# Patient Record
Sex: MALE | Race: OTHER | HISPANIC OR LATINO | URBAN - METROPOLITAN AREA
[De-identification: names, ages, dates, MRNs, and addresses within clinical notes are randomized per-mention and may not be internally consistent; named-entity substitution may affect disease eponyms.]

---

## 2020-04-09 ENCOUNTER — EMERGENCY (EMERGENCY)
Facility: HOSPITAL | Age: 30
LOS: 1 days | Discharge: HOME | End: 2020-04-09
Attending: EMERGENCY MEDICINE
Payer: MEDICARE

## 2020-04-09 VITALS
WEIGHT: 121.25 LBS | SYSTOLIC BLOOD PRESSURE: 124 MMHG | HEART RATE: 94 BPM | DIASTOLIC BLOOD PRESSURE: 79 MMHG | RESPIRATION RATE: 22 BRPM

## 2020-04-09 PROCEDURE — 99283 EMERGENCY DEPT VISIT LOW MDM: CPT

## 2020-04-09 RX ORDER — ACETAMINOPHEN 500 MG
650 TABLET ORAL ONCE
Refills: 0 | Status: COMPLETED | OUTPATIENT
Start: 2020-04-09 | End: 2020-04-09

## 2020-04-09 RX ADMIN — Medication 650 MILLIGRAM(S): at 22:42

## 2020-04-09 NOTE — ED PROVIDER NOTE - NSFOLLOWUPINSTRUCTIONS_ED_ALL_ED_FT
COVID-19 testing are currently being prioritized at [facility] for admitted patients. Patients that are stable for discharged from the ED will have COVID-19 testing performed within 7 days at which time most symptoms should improve.  For that reason, although there is suspicion that you may have Coronavirus, you may not have been tested today. Please follow instruction on provided COVID-19 discharge educational forms and self quarantine for 14 days. Return to the ED immediately if you have shortness of breath, fever, pain, weakness, vomiting any concerns.  For questions on COVID-19 :                                                                                                      [facility] Hotline # 1- [phone]                                                                                                                        [facility] Covid-19 website - [email or web address]     Drink plenty of fluids take Tylenol 2 tabs every 4 hours for fever and body aches.

## 2020-04-09 NOTE — ED PROVIDER NOTE - CLINICAL SUMMARY MEDICAL DECISION MAKING FREE TEXT BOX
105a- Pt is nontoxic appearing, not hypoxic (repeat Pox 96% RA), no respiratory distress, not hypotensive. Now afebrile.  High suspicion for COVID 19 given pandemic. Pt instructed on self quarantine and monitoring. Return precautions explained.   Pt is well appearing, has no new complaints and able to walk with normal gait. Pt is stable for discharge and follow up with medical doctor. Pt educated on care and need for follow up. Discussed anticipatory guidance and return precautions. Questions answered. I had a detailed discussion with the patient and/or guardian regarding the historical points, exam findings, and any diagnostic results supporting the discharge diagnosis.

## 2020-04-09 NOTE — ED PROVIDER NOTE - PATIENT PORTAL LINK FT
You can access the FollowMyHealth Patient Portal offered by [facility] by registering at the following website: [email or web address]. By joining [employer]’s FollowMyHealth portal, you will also be able to view your health information using other applications (apps) compatible with our system.

## 2020-04-09 NOTE — ED PROVIDER NOTE - NSFOLLOWUPCLINICS_GEN_ALL_ED_FT
[facility]  Internal Medicine  [address] [place] [address]  Phone: [phone]  Fax: [phone]  Follow Up Time:

## 2020-04-10 ENCOUNTER — EMERGENCY (EMERGENCY)
Facility: HOSPITAL | Age: 30
LOS: 1 days | Discharge: HOME | End: 2020-04-10
Attending: EMERGENCY MEDICINE
Payer: SELF-PAY

## 2020-04-10 VITALS
RESPIRATION RATE: 20 BRPM | WEIGHT: 195.11 LBS | TEMPERATURE: 97 F | OXYGEN SATURATION: 92 % | SYSTOLIC BLOOD PRESSURE: 112 MMHG | DIASTOLIC BLOOD PRESSURE: 64 MMHG | HEART RATE: 83 BPM | HEIGHT: 67 IN

## 2020-04-10 VITALS
RESPIRATION RATE: 20 BRPM | SYSTOLIC BLOOD PRESSURE: 148 MMHG | OXYGEN SATURATION: 94 % | TEMPERATURE: 99 F | DIASTOLIC BLOOD PRESSURE: 88 MMHG | HEART RATE: 89 BPM

## 2020-04-10 VITALS
TEMPERATURE: 99 F | RESPIRATION RATE: 20 BRPM | HEART RATE: 76 BPM | OXYGEN SATURATION: 96 % | SYSTOLIC BLOOD PRESSURE: 111 MMHG | DIASTOLIC BLOOD PRESSURE: 69 MMHG

## 2020-04-10 LAB — SARS-COV-2 RNA SPEC QL NAA+PROBE: DETECTED

## 2020-04-10 PROCEDURE — 99283 EMERGENCY DEPT VISIT LOW MDM: CPT

## 2020-04-10 PROCEDURE — 71045 X-RAY EXAM CHEST 1 VIEW: CPT

## 2020-04-10 PROCEDURE — 87635 SARS-COV-2 COVID-19 AMP PRB: CPT

## 2020-04-10 PROCEDURE — 99283 EMERGENCY DEPT VISIT LOW MDM: CPT | Mod: 25

## 2020-04-10 PROCEDURE — 71045 X-RAY EXAM CHEST 1 VIEW: CPT | Mod: 26

## 2020-04-10 RX ORDER — HYDROXYCHLOROQUINE SULFATE 200 MG
1 TABLET ORAL
Qty: 5 | Refills: 0
Start: 2020-04-10 | End: 2020-04-14

## 2020-04-10 RX ORDER — HYDROXYCHLOROQUINE SULFATE 200 MG
1 TABLET ORAL
Qty: 10 | Refills: 0
Start: 2020-04-10 | End: 2020-04-14

## 2020-04-10 RX ORDER — HYDROXYCHLOROQUINE SULFATE 200 MG
400 TABLET ORAL ONCE
Refills: 0 | Status: COMPLETED | OUTPATIENT
Start: 2020-04-10 | End: 2020-04-10

## 2020-04-10 RX ADMIN — Medication 400 MILLIGRAM(S): at 22:54

## 2020-04-10 NOTE — ED ADULT NURSE REASSESSMENT NOTE - NS ED NURSE REASSESS COMMENT FT1
Paged pharmacy again, regarding patients discharge medication. Pharmacy reports they will sending down as soon as possible. Pt in NAD. Continue to monitor patient.
Pt A&Ox3, vitals stable, in NAD.

## 2020-04-10 NOTE — ED PROVIDER NOTE - NSFOLLOWUPCLINICS_GEN_ALL_ED_FT
[facility]  Internal Medicine  [address] [place] [address]  Phone: [phone]  Fax: [phone]  Follow Up Time:

## 2020-04-10 NOTE — ED ADULT NURSE NOTE - CAS ELECT INFOMATION PROVIDED
.  Chief Complaint   Patient presents with    Physical    Medication Refill     Ming Fay DC instructions

## 2020-04-10 NOTE — ED PROVIDER NOTE - PROGRESS NOTE DETAILS
Xray w min bibasilar infiltrates. Satting wnl on RA, HR-95, comfortable. Good Covid DC/return instructions/ precautions given. Will DC Reexamined, D2All-84% on RA, HR-88. Stable for DC

## 2020-04-10 NOTE — ED PROVIDER NOTE - PATIENT PORTAL LINK FT
You can access the FollowMyHealth Patient Portal offered by [facility] by registering at the following website: [email or web address]. By joining [employer]’s FollowMyHealth portal, you will also be able to view your health information using other applications (apps) compatible with our system.

## 2020-04-10 NOTE — ED PROVIDER NOTE - PHYSICAL EXAMINATION
Vital signs reviewed and are as documented.  O2Sat on RA 93% on RA, HR-95  GENERAL: no acute distress, no conversational dyspnea, no lethargy  HEAD: Atraumatic  ENT: performed visually  to limit exposure risk to COVID 19; no drooling, no muffled voice, no trismus  NECK: trachea midline, no visible masses, no visible JVD  CARDIO: auscultation deferred to limit exposure risk to COVID 19, HR as documented in vital signs  RESPIRATORY: no conversational dyspnea, No respiratory distress.  No visible increased work of breathing,  auscultation deferred to limit exposure risk to COVID 19  EXTREMITIES: moves all extremities spontaneously  NEURO: Awake and alert.  No gross motor deficits.  Ambulates independently.  PSYCH: calm, cooperative

## 2020-04-10 NOTE — ED PROVIDER NOTE - NSFOLLOWUPINSTRUCTIONS_ED_ALL_ED_FT
Take meds as prescribed.  Take Tylenol as needed for pains/fevers.  Take Vitamin C, D3 and Zinc supplements.  Perform breathing exercises (take deep breaths in and hold it for 30 seconds) throughout the day.  Lay on your abdomen and sides when resting, rotate all different positions as it helps with your breathing.  Follow up with your doctor or in the Clinic as discussed within 1 week.  Return to the ER for worsening shortness of breath, lethargy, inability to ambulate or any other concerns.       Concow los medicamentos según lo prescrito.  Concow Tylenol según sea necesario para manish / fiebres.  Love suplementos de vitamina C, D3 y zinc.  Realice ejercicios de respiración (respire profundamente y manténgalo everardo 30 segundos) everardo todo el día.  Acuéstese sobre manzanares abdomen y costados cuando descanse, gire todas las posiciones diferentes, ya que esto ayuda con manzanares respiración.  Sharri un seguimiento con manzanares médico o en la Clínica según lo discutido dentro de 1 semana.  Regrese a la alexi de emergencias por empeoramiento de la dificultad para respirar, letargo, incapacidad para deambular o cualquier otra inquietud.    1. You were seen in the ED and underwent testing for the novel coronarvirus (COVID-19). The results are not back yet and you will be contacted with the result which can take up to 7 days. You were also tested for other common viruses such as the flu and cold viruses. You will be notified if you test positive for any of these.    2. Until your test results are back, YOU MUST SELF-QUARANTINE until you are told to other otherwise by [facility] or the local [facility] health department. This is extremely important to limit the spread of this virus. Please refer closely to the packet provided to you on the specifics of the process of self-quarantine.    3. If you end up testing positive for the virus, you will instructed as to when you can return to your usual activities. If you do not hear from anyone in 7 days, please call [phone] or your local health department for guidance.     4. Return to the ED for difficulty breathing.    5. You may over the counter acetaminophen (Tylenol) 650mg every 6 hours as needed for fever or pain. There is some concern in the medical community about using ibuprofen (Advil, Motrin) and other NSAIDs in people with COVID infections and until there is more research on this subject it may be best to avoid NSAIDs like ibuprofen at the moment unless you have an allergy to acetaminophen (Tylenol).  Do NOT exceed 3500mg acetaminophen in 24 hours.  Please do not take these medications if you do not have pain or fever or if you have any history of liver disease.     -------------    What is a coronavirus?  Coronaviruses are a large family of viruses that cause illnesses ranging from the common cold to more severe diseases such as Middle East Respiratory Syndrome (MERS) and Severe Acute Respiratory Syndrome (SARS).    What is Novel Coronavirus (COVID-19)?  The Centers for Disease Control and Prevention (CDC) is closely monitoring the outbreak caused by COVID-19. For the latest information about COVID-19, visit the CDC website at CDC.gov/Coronavirus    How are coronaviruses spread?  Coronaviruses can be transmitted from person to person, usually after close contact with an infected  person (for example, in a household, workplace, or healthcare setting), via droplets that become airborne after a cough or sneeze. These droplets can then infect a nearby person. Transmission can also occur by touching recently contaminated surfaces.    Is there a treatment for a COVID-19?  There is no specific treatment for disease caused by COVID-19. However, many of the symptoms can be treated based on the patient’s clinical condition. Supportive care for infected persons can be highly effective.    What are the symptoms of coronavirus infection?  It depends on the virus, but common signs include fever and/or respiratory symptoms such as cough and shortness of breath. In more severe cases, infection can cause pneumonia, severe acute respiratory syndrome, kidney failure and even death. Fortunately, most cases of COVID-19 have an illness no different than the influenza (flu), with a majority of these patients having mild symptoms and overall mortality which appears to be not much different than the flu.    What can I do to protect myself?  The best precautionary measures:  – washing your hands  – covering your cough  – disinfecting surfaces  – it is also advisable to avoid close contact with anyone showing symptoms of respiratory illness such as coughing and sneezing  – those with symptoms should wear a surgical mask when around others    What can I do to protect those around me?  If you have been identified as someone who may be infected with COVID-19, we recommend you follow the self-isolation procedures outlined on the following page to protect those around you and to limit the spread of this virus.    We recommend the below precautionary steps from now until 14 days from when you returned from your travel or date of your last known possible contact:    — Do not go to work, school or public areas. Avoid using public transportation, ridesharing or taxis.  — As much as possible, separate yourself from other people in your home. If you can, you should stay in a room and away from other people. Also, you should use a separate bathroom if available.  — Wear the supplied mask whenever you are around other people.  — If you have a non-urgent medical appointment, please reschedule for a later date. If the appointment is urgent, please call the health care provider and tell them that you are on self-isolation for possible COVID-19. This will help the health care provider’s office take steps to keep other people from getting infected or exposed. If you can reschedule routine appointments, do so.  — Wash your hands often with soap and water for at least 15 to 20 seconds or clean your hands with an alcohol-based hand  that contains 60 to 95% alcohol, covering all surfaces of your hands and rubbing them together until they feel dry. Soap and water should be used preferentially if hands are visibly dirty.  — Cover your mouth and nose with a tissue when you cough or sneeze. Throw used tissues in a lined trash can. Immediately wash your hands.  — Avoid touching your eyes, nose, and mouth with your hands.  — Avoid sharing personal household items. You should not share dishes, drinking glasses, cups, eating utensils, towels, or bedding with other people or pets in your home. After using these items, they should be washed thoroughly with soap and water.  — Clean and disinfect all “high-touch” surfaces every day. High touch surfaces include counters, tabletops, doorknobs, light switches, remote controls, bathroom fixtures, toilets, phones, keyboards, tablets, and bedside tables. Also, clean any surfaces that may have blood, stool, or body fluids on them.    ------------------------------------------  Information for patients who have received a COVID-19 test.    The COVID-19 (novel coronavirus) test  Results may take up to 7 days to become available.      If your result is positive, you will receive a phone call from one of our coronavirus specialists. While we will do our best to also call patients with a negative test result, the sheer volume of tests being performed may make this difficult to do in a timely fashion. If you haven’t heard from us within 5 days and you’d like to check on your results, you can check our [place] nithya or call one of our coronavirus specialists at [address] [place] [address] (available 24/7)    Please DO NOT call the site where you received the test to obtain your results.    If the test is positive -   You will continue home isolation until you are completely well, you have no fever, and it has been at least 14 days since your positive test. The health department in your city or county may also contact you with additional instructions.    If your test is negative -    You will be able to stop home isolation and resume standard precautions, similar to how you would manage the common cold or flu.  If you have any questions, you can reach out to one of our coronavirus specialists at [address] [place] [address].    REMEMBER - a negative COVID test means you were negative AT THE TIME OF TESTING, and it is possible to have contracted COVID after being tested.

## 2020-04-10 NOTE — ED PROVIDER NOTE - OBJECTIVE STATEMENT
28yo M w DM on Metformin, smoker 1 pack per week, in the ER for body aches, fevers for 2 wks, SOb and dry cough for the past 4-5 days. No other stx. Was seen in the ER last night, tested pos for Covid, was found to be febrile but J3rtz-86% on RA.

## 2020-04-11 PROBLEM — E11.9 TYPE 2 DIABETES MELLITUS WITHOUT COMPLICATIONS: Chronic | Status: ACTIVE | Noted: 2020-04-09

## 2022-02-20 ENCOUNTER — INPATIENT (INPATIENT)
Facility: HOSPITAL | Age: 32
LOS: 9 days | Discharge: HOME | DRG: 439 | End: 2022-03-02
Attending: INTERNAL MEDICINE | Admitting: INTERNAL MEDICINE
Payer: MEDICAID

## 2022-02-20 VITALS
DIASTOLIC BLOOD PRESSURE: 98 MMHG | SYSTOLIC BLOOD PRESSURE: 151 MMHG | HEIGHT: 67 IN | WEIGHT: 235.89 LBS | HEART RATE: 94 BPM | RESPIRATION RATE: 18 BRPM | TEMPERATURE: 99 F | OXYGEN SATURATION: 98 %

## 2022-02-20 DIAGNOSIS — K85.90 ACUTE PANCREATITIS WITHOUT NECROSIS OR INFECTION, UNSPECIFIED: ICD-10-CM

## 2022-02-20 LAB
ALBUMIN SERPL ELPH-MCNC: 3.3 G/DL — LOW (ref 3.5–5)
ALP SERPL-CCNC: 93 U/L — SIGNIFICANT CHANGE UP (ref 40–120)
ALT FLD-CCNC: 201 U/L DA — HIGH (ref 10–60)
ANION GAP SERPL CALC-SCNC: 12 MMOL/L — SIGNIFICANT CHANGE UP (ref 5–17)
ANION GAP SERPL CALC-SCNC: 8 MMOL/L — SIGNIFICANT CHANGE UP (ref 5–17)
ANION GAP SERPL CALC-SCNC: 8 MMOL/L — SIGNIFICANT CHANGE UP (ref 5–17)
APPEARANCE UR: CLEAR — SIGNIFICANT CHANGE UP
AST SERPL-CCNC: 73 U/L — HIGH (ref 10–40)
BASOPHILS # BLD AUTO: 0.04 K/UL — SIGNIFICANT CHANGE UP (ref 0–0.2)
BASOPHILS NFR BLD AUTO: 0.3 % — SIGNIFICANT CHANGE UP (ref 0–2)
BILIRUB DIRECT SERPL-MCNC: <0.1 MG/DL — SIGNIFICANT CHANGE UP (ref 0–0.3)
BILIRUB INDIRECT FLD-MCNC: >1.1 MG/DL — HIGH (ref 0.2–1)
BILIRUB SERPL-MCNC: 1.2 MG/DL — SIGNIFICANT CHANGE UP (ref 0.2–1.2)
BILIRUB UR-MCNC: NEGATIVE — SIGNIFICANT CHANGE UP
BUN SERPL-MCNC: 10 MG/DL — SIGNIFICANT CHANGE UP (ref 7–18)
BUN SERPL-MCNC: 6 MG/DL — LOW (ref 7–18)
BUN SERPL-MCNC: 8 MG/DL — SIGNIFICANT CHANGE UP (ref 7–18)
CALCIUM SERPL-MCNC: 7.4 MG/DL — LOW (ref 8.4–10.5)
CALCIUM SERPL-MCNC: 7.8 MG/DL — LOW (ref 8.4–10.5)
CALCIUM SERPL-MCNC: 7.9 MG/DL — LOW (ref 8.4–10.5)
CHLORIDE SERPL-SCNC: 103 MMOL/L — SIGNIFICANT CHANGE UP (ref 96–108)
CHLORIDE SERPL-SCNC: 105 MMOL/L — SIGNIFICANT CHANGE UP (ref 96–108)
CHLORIDE SERPL-SCNC: 94 MMOL/L — LOW (ref 96–108)
CO2 SERPL-SCNC: 22 MMOL/L — SIGNIFICANT CHANGE UP (ref 22–31)
CO2 SERPL-SCNC: 22 MMOL/L — SIGNIFICANT CHANGE UP (ref 22–31)
CO2 SERPL-SCNC: 23 MMOL/L — SIGNIFICANT CHANGE UP (ref 22–31)
COLOR SPEC: YELLOW — SIGNIFICANT CHANGE UP
CREAT SERPL-MCNC: 0.51 MG/DL — SIGNIFICANT CHANGE UP (ref 0.5–1.3)
CREAT SERPL-MCNC: 0.61 MG/DL — SIGNIFICANT CHANGE UP (ref 0.5–1.3)
CREAT SERPL-MCNC: 0.73 MG/DL — SIGNIFICANT CHANGE UP (ref 0.5–1.3)
DIFF PNL FLD: NEGATIVE — SIGNIFICANT CHANGE UP
EOSINOPHIL # BLD AUTO: 0.02 K/UL — SIGNIFICANT CHANGE UP (ref 0–0.5)
EOSINOPHIL NFR BLD AUTO: 0.1 % — SIGNIFICANT CHANGE UP (ref 0–6)
GLUCOSE BLDC GLUCOMTR-MCNC: 156 MG/DL — HIGH (ref 70–99)
GLUCOSE BLDC GLUCOMTR-MCNC: 161 MG/DL — HIGH (ref 70–99)
GLUCOSE BLDC GLUCOMTR-MCNC: 169 MG/DL — HIGH (ref 70–99)
GLUCOSE BLDC GLUCOMTR-MCNC: 189 MG/DL — HIGH (ref 70–99)
GLUCOSE BLDC GLUCOMTR-MCNC: 192 MG/DL — HIGH (ref 70–99)
GLUCOSE BLDC GLUCOMTR-MCNC: 211 MG/DL — HIGH (ref 70–99)
GLUCOSE BLDC GLUCOMTR-MCNC: 214 MG/DL — HIGH (ref 70–99)
GLUCOSE BLDC GLUCOMTR-MCNC: 215 MG/DL — HIGH (ref 70–99)
GLUCOSE SERPL-MCNC: 193 MG/DL — HIGH (ref 70–99)
GLUCOSE SERPL-MCNC: 220 MG/DL — HIGH (ref 70–99)
GLUCOSE SERPL-MCNC: 329 MG/DL — HIGH (ref 70–99)
GLUCOSE UR QL: 1000 MG/DL
HCT VFR BLD CALC: 44.6 % — SIGNIFICANT CHANGE UP (ref 39–50)
HGB BLD-MCNC: 15.9 G/DL — SIGNIFICANT CHANGE UP (ref 13–17)
HIV 1 & 2 AB SERPL IA.RAPID: SIGNIFICANT CHANGE UP
IMM GRANULOCYTES NFR BLD AUTO: 0.3 % — SIGNIFICANT CHANGE UP (ref 0–1.5)
KETONES UR-MCNC: ABNORMAL
LACTATE SERPL-SCNC: <0.3 — SIGNIFICANT CHANGE UP (ref 0.7–2)
LDH SERPL L TO P-CCNC: 247 U/L — HIGH (ref 120–225)
LEUKOCYTE ESTERASE UR-ACNC: NEGATIVE — SIGNIFICANT CHANGE UP
LIDOCAIN IGE QN: 4550 U/L — HIGH (ref 73–393)
LYMPHOCYTES # BLD AUTO: 1.36 K/UL — SIGNIFICANT CHANGE UP (ref 1–3.3)
LYMPHOCYTES # BLD AUTO: 10.1 % — LOW (ref 13–44)
MCHC RBC-ENTMCNC: 27.9 PG — SIGNIFICANT CHANGE UP (ref 27–34)
MCHC RBC-ENTMCNC: 35.7 GM/DL — SIGNIFICANT CHANGE UP (ref 32–36)
MCV RBC AUTO: 78.4 FL — LOW (ref 80–100)
MONOCYTES # BLD AUTO: 0.95 K/UL — HIGH (ref 0–0.9)
MONOCYTES NFR BLD AUTO: 7.1 % — SIGNIFICANT CHANGE UP (ref 2–14)
NEUTROPHILS # BLD AUTO: 11.04 K/UL — HIGH (ref 1.8–7.4)
NEUTROPHILS NFR BLD AUTO: 82.1 % — HIGH (ref 43–77)
NITRITE UR-MCNC: NEGATIVE — SIGNIFICANT CHANGE UP
NRBC # BLD: 0 /100 WBCS — SIGNIFICANT CHANGE UP (ref 0–0)
PH UR: 6 — SIGNIFICANT CHANGE UP (ref 5–8)
PLATELET # BLD AUTO: 192 K/UL — SIGNIFICANT CHANGE UP (ref 150–400)
POTASSIUM SERPL-MCNC: 3.2 MMOL/L — LOW (ref 3.5–5.3)
POTASSIUM SERPL-MCNC: 3.3 MMOL/L — LOW (ref 3.5–5.3)
POTASSIUM SERPL-MCNC: 3.9 MMOL/L — SIGNIFICANT CHANGE UP (ref 3.5–5.3)
POTASSIUM SERPL-SCNC: 3.2 MMOL/L — LOW (ref 3.5–5.3)
POTASSIUM SERPL-SCNC: 3.3 MMOL/L — LOW (ref 3.5–5.3)
POTASSIUM SERPL-SCNC: 3.9 MMOL/L — SIGNIFICANT CHANGE UP (ref 3.5–5.3)
PROT SERPL-MCNC: 8.2 G/DL — SIGNIFICANT CHANGE UP (ref 6–8.3)
PROT UR-MCNC: 30 MG/DL
RBC # BLD: 5.69 M/UL — SIGNIFICANT CHANGE UP (ref 4.2–5.8)
RBC # FLD: 13.3 % — SIGNIFICANT CHANGE UP (ref 10.3–14.5)
SARS-COV-2 RNA SPEC QL NAA+PROBE: SIGNIFICANT CHANGE UP
SODIUM SERPL-SCNC: 128 MMOL/L — LOW (ref 135–145)
SODIUM SERPL-SCNC: 134 MMOL/L — LOW (ref 135–145)
SODIUM SERPL-SCNC: 135 MMOL/L — SIGNIFICANT CHANGE UP (ref 135–145)
SP GR SPEC: 1.02 — SIGNIFICANT CHANGE UP (ref 1.01–1.02)
TRIGL SERPL-MCNC: 1139 MG/DL — HIGH
TRIGL SERPL-MCNC: 1984 MG/DL — HIGH
UROBILINOGEN FLD QL: NEGATIVE — SIGNIFICANT CHANGE UP
WBC # BLD: 13.45 K/UL — HIGH (ref 3.8–10.5)
WBC # FLD AUTO: 13.45 K/UL — HIGH (ref 3.8–10.5)

## 2022-02-20 PROCEDURE — 99285 EMERGENCY DEPT VISIT HI MDM: CPT

## 2022-02-20 PROCEDURE — 74177 CT ABD & PELVIS W/CONTRAST: CPT | Mod: 26,MA

## 2022-02-20 RX ORDER — MORPHINE SULFATE 50 MG/1
1 CAPSULE, EXTENDED RELEASE ORAL EVERY 6 HOURS
Refills: 0 | Status: DISCONTINUED | OUTPATIENT
Start: 2022-02-20 | End: 2022-02-21

## 2022-02-20 RX ORDER — HYDROMORPHONE HYDROCHLORIDE 2 MG/ML
1 INJECTION INTRAMUSCULAR; INTRAVENOUS; SUBCUTANEOUS ONCE
Refills: 0 | Status: DISCONTINUED | OUTPATIENT
Start: 2022-02-20 | End: 2022-02-20

## 2022-02-20 RX ORDER — SODIUM CHLORIDE 9 MG/ML
1000 INJECTION, SOLUTION INTRAVENOUS
Refills: 0 | Status: DISCONTINUED | OUTPATIENT
Start: 2022-02-20 | End: 2022-02-20

## 2022-02-20 RX ORDER — HYDROMORPHONE HYDROCHLORIDE 2 MG/ML
2 INJECTION INTRAMUSCULAR; INTRAVENOUS; SUBCUTANEOUS ONCE
Refills: 0 | Status: DISCONTINUED | OUTPATIENT
Start: 2022-02-20 | End: 2022-02-20

## 2022-02-20 RX ORDER — SODIUM CHLORIDE 9 MG/ML
1000 INJECTION INTRAMUSCULAR; INTRAVENOUS; SUBCUTANEOUS ONCE
Refills: 0 | Status: COMPLETED | OUTPATIENT
Start: 2022-02-20 | End: 2022-02-20

## 2022-02-20 RX ORDER — PANTOPRAZOLE SODIUM 20 MG/1
40 TABLET, DELAYED RELEASE ORAL DAILY
Refills: 0 | Status: DISCONTINUED | OUTPATIENT
Start: 2022-02-20 | End: 2022-02-25

## 2022-02-20 RX ORDER — MORPHINE SULFATE 50 MG/1
6 CAPSULE, EXTENDED RELEASE ORAL ONCE
Refills: 0 | Status: DISCONTINUED | OUTPATIENT
Start: 2022-02-20 | End: 2022-02-20

## 2022-02-20 RX ORDER — ENOXAPARIN SODIUM 100 MG/ML
40 INJECTION SUBCUTANEOUS DAILY
Refills: 0 | Status: DISCONTINUED | OUTPATIENT
Start: 2022-02-20 | End: 2022-03-02

## 2022-02-20 RX ORDER — MORPHINE SULFATE 50 MG/1
2 CAPSULE, EXTENDED RELEASE ORAL EVERY 6 HOURS
Refills: 0 | Status: DISCONTINUED | OUTPATIENT
Start: 2022-02-20 | End: 2022-02-20

## 2022-02-20 RX ORDER — DEXTROSE 50 % IN WATER 50 %
25 SYRINGE (ML) INTRAVENOUS
Refills: 0 | Status: DISCONTINUED | OUTPATIENT
Start: 2022-02-20 | End: 2022-02-20

## 2022-02-20 RX ORDER — ONDANSETRON 8 MG/1
4 TABLET, FILM COATED ORAL ONCE
Refills: 0 | Status: COMPLETED | OUTPATIENT
Start: 2022-02-20 | End: 2022-02-20

## 2022-02-20 RX ORDER — MORPHINE SULFATE 50 MG/1
1 CAPSULE, EXTENDED RELEASE ORAL EVERY 6 HOURS
Refills: 0 | Status: DISCONTINUED | OUTPATIENT
Start: 2022-02-20 | End: 2022-02-20

## 2022-02-20 RX ORDER — SODIUM CHLORIDE 9 MG/ML
1000 INJECTION, SOLUTION INTRAVENOUS
Refills: 0 | Status: DISCONTINUED | OUTPATIENT
Start: 2022-02-20 | End: 2022-02-21

## 2022-02-20 RX ORDER — INSULIN HUMAN 100 [IU]/ML
10 INJECTION, SOLUTION SUBCUTANEOUS
Qty: 100 | Refills: 0 | Status: DISCONTINUED | OUTPATIENT
Start: 2022-02-20 | End: 2022-02-20

## 2022-02-20 RX ORDER — POTASSIUM CHLORIDE 20 MEQ
10 PACKET (EA) ORAL
Refills: 0 | Status: COMPLETED | OUTPATIENT
Start: 2022-02-20 | End: 2022-02-20

## 2022-02-20 RX ORDER — INSULIN HUMAN 100 [IU]/ML
7 INJECTION, SOLUTION SUBCUTANEOUS
Qty: 100 | Refills: 0 | Status: DISCONTINUED | OUTPATIENT
Start: 2022-02-20 | End: 2022-02-20

## 2022-02-20 RX ORDER — HYDROMORPHONE HYDROCHLORIDE 2 MG/ML
2 INJECTION INTRAMUSCULAR; INTRAVENOUS; SUBCUTANEOUS EVERY 4 HOURS
Refills: 0 | Status: DISCONTINUED | OUTPATIENT
Start: 2022-02-20 | End: 2022-02-21

## 2022-02-20 RX ORDER — INSULIN HUMAN 100 [IU]/ML
5 INJECTION, SOLUTION SUBCUTANEOUS
Qty: 100 | Refills: 0 | Status: DISCONTINUED | OUTPATIENT
Start: 2022-02-20 | End: 2022-02-21

## 2022-02-20 RX ORDER — DEXTROSE 50 % IN WATER 50 %
50 SYRINGE (ML) INTRAVENOUS
Refills: 0 | Status: DISCONTINUED | OUTPATIENT
Start: 2022-02-20 | End: 2022-02-20

## 2022-02-20 RX ORDER — CHLORHEXIDINE GLUCONATE 213 G/1000ML
1 SOLUTION TOPICAL
Refills: 0 | Status: DISCONTINUED | OUTPATIENT
Start: 2022-02-20 | End: 2022-02-24

## 2022-02-20 RX ADMIN — HYDROMORPHONE HYDROCHLORIDE 2 MILLIGRAM(S): 2 INJECTION INTRAMUSCULAR; INTRAVENOUS; SUBCUTANEOUS at 18:45

## 2022-02-20 RX ADMIN — HYDROMORPHONE HYDROCHLORIDE 2 MILLIGRAM(S): 2 INJECTION INTRAMUSCULAR; INTRAVENOUS; SUBCUTANEOUS at 10:03

## 2022-02-20 RX ADMIN — HYDROMORPHONE HYDROCHLORIDE 2 MILLIGRAM(S): 2 INJECTION INTRAMUSCULAR; INTRAVENOUS; SUBCUTANEOUS at 13:35

## 2022-02-20 RX ADMIN — HYDROMORPHONE HYDROCHLORIDE 2 MILLIGRAM(S): 2 INJECTION INTRAMUSCULAR; INTRAVENOUS; SUBCUTANEOUS at 10:20

## 2022-02-20 RX ADMIN — ONDANSETRON 4 MILLIGRAM(S): 8 TABLET, FILM COATED ORAL at 05:08

## 2022-02-20 RX ADMIN — SODIUM CHLORIDE 1000 MILLILITER(S): 9 INJECTION INTRAMUSCULAR; INTRAVENOUS; SUBCUTANEOUS at 05:08

## 2022-02-20 RX ADMIN — HYDROMORPHONE HYDROCHLORIDE 2 MILLIGRAM(S): 2 INJECTION INTRAMUSCULAR; INTRAVENOUS; SUBCUTANEOUS at 22:04

## 2022-02-20 RX ADMIN — MORPHINE SULFATE 6 MILLIGRAM(S): 50 CAPSULE, EXTENDED RELEASE ORAL at 05:10

## 2022-02-20 RX ADMIN — HYDROMORPHONE HYDROCHLORIDE 2 MILLIGRAM(S): 2 INJECTION INTRAMUSCULAR; INTRAVENOUS; SUBCUTANEOUS at 21:34

## 2022-02-20 RX ADMIN — SODIUM CHLORIDE 1000 MILLILITER(S): 9 INJECTION INTRAMUSCULAR; INTRAVENOUS; SUBCUTANEOUS at 06:36

## 2022-02-20 RX ADMIN — INSULIN HUMAN 10 UNIT(S)/HR: 100 INJECTION, SOLUTION SUBCUTANEOUS at 09:00

## 2022-02-20 RX ADMIN — MORPHINE SULFATE 2 MILLIGRAM(S): 50 CAPSULE, EXTENDED RELEASE ORAL at 08:55

## 2022-02-20 RX ADMIN — SODIUM CHLORIDE 100 MILLILITER(S): 9 INJECTION, SOLUTION INTRAVENOUS at 08:58

## 2022-02-20 RX ADMIN — PANTOPRAZOLE SODIUM 40 MILLIGRAM(S): 20 TABLET, DELAYED RELEASE ORAL at 13:13

## 2022-02-20 RX ADMIN — HYDROMORPHONE HYDROCHLORIDE 2 MILLIGRAM(S): 2 INJECTION INTRAMUSCULAR; INTRAVENOUS; SUBCUTANEOUS at 13:15

## 2022-02-20 RX ADMIN — Medication 100 MILLIEQUIVALENT(S): at 22:00

## 2022-02-20 RX ADMIN — SODIUM CHLORIDE 1000 MILLILITER(S): 9 INJECTION INTRAMUSCULAR; INTRAVENOUS; SUBCUTANEOUS at 06:15

## 2022-02-20 RX ADMIN — HYDROMORPHONE HYDROCHLORIDE 2 MILLIGRAM(S): 2 INJECTION INTRAMUSCULAR; INTRAVENOUS; SUBCUTANEOUS at 02:00

## 2022-02-20 RX ADMIN — INSULIN HUMAN 5 UNIT(S)/HR: 100 INJECTION, SOLUTION SUBCUTANEOUS at 18:30

## 2022-02-20 RX ADMIN — MORPHINE SULFATE 2 MILLIGRAM(S): 50 CAPSULE, EXTENDED RELEASE ORAL at 09:10

## 2022-02-20 RX ADMIN — MORPHINE SULFATE 1 MILLIGRAM(S): 50 CAPSULE, EXTENDED RELEASE ORAL at 20:10

## 2022-02-20 RX ADMIN — HYDROMORPHONE HYDROCHLORIDE 2 MILLIGRAM(S): 2 INJECTION INTRAMUSCULAR; INTRAVENOUS; SUBCUTANEOUS at 18:30

## 2022-02-20 RX ADMIN — Medication 100 MILLIEQUIVALENT(S): at 19:33

## 2022-02-20 RX ADMIN — ENOXAPARIN SODIUM 40 MILLIGRAM(S): 100 INJECTION SUBCUTANEOUS at 13:13

## 2022-02-20 RX ADMIN — SODIUM CHLORIDE 100 MILLILITER(S): 9 INJECTION, SOLUTION INTRAVENOUS at 10:03

## 2022-02-20 RX ADMIN — Medication 100 MILLIEQUIVALENT(S): at 18:31

## 2022-02-20 RX ADMIN — MORPHINE SULFATE 6 MILLIGRAM(S): 50 CAPSULE, EXTENDED RELEASE ORAL at 06:31

## 2022-02-20 RX ADMIN — HYDROMORPHONE HYDROCHLORIDE 1 MILLIGRAM(S): 2 INJECTION INTRAMUSCULAR; INTRAVENOUS; SUBCUTANEOUS at 06:55

## 2022-02-20 RX ADMIN — HYDROMORPHONE HYDROCHLORIDE 1 MILLIGRAM(S): 2 INJECTION INTRAMUSCULAR; INTRAVENOUS; SUBCUTANEOUS at 06:36

## 2022-02-20 NOTE — H&P ADULT - HISTORY OF PRESENT ILLNESS
31yoM with h/o DMon oral medications (unsure about name and dose), presents generalized though possibly slightly epigastric-predominant abdominal pain x 3 days. Worsened with laying and standing, non-food related per se. Patient reports vomiting episode and persistent nausea.  Also notes constipation (last BM yesterday) and low urine output.   Patient also reports unable to pass flatus and had last BM on 1 day ago.   Patient reports he had similar complaints 2 years ago but it was not this bad and did not require hospitalization.    ED Course:  TG 1984, Lipase 4550 , Na 128  ICU Vital Signs Last 24 Hrs  T(C): 36.7 (20 Feb 2022 07:00), Max: 37 (20 Feb 2022 04:18)  T(F): 98 (20 Feb 2022 07:00), Max: 98.6 (20 Feb 2022 04:18)  HR: 88 (20 Feb 2022 07:00) (88 - 94)  BP: 137/82 (20 Feb 2022 07:00) (137/82 - 151/98)  RR: 18 (20 Feb 2022 07:00) (18 - 18)  SpO2: 99% (20 Feb 2022 07:00) (98% - 99%)

## 2022-02-20 NOTE — PATIENT PROFILE ADULT - FALL HARM RISK - UNIVERSAL INTERVENTIONS
Bed in lowest position, wheels locked, appropriate side rails in place/Call bell, personal items and telephone in reach/Instruct patient to call for assistance before getting out of bed or chair/Non-slip footwear when patient is out of bed/[place] to call system/Physically safe environment - no spills, clutter or unnecessary equipment/Purposeful Proactive Rounding/Room/bathroom lighting operational, light cord in reach

## 2022-02-20 NOTE — ED PROVIDER NOTE - OBJECTIVE STATEMENT
[id number].  31yoM with h/o DM on Janumet, presents generalized though possibly slightly epigastric-predominant abdominal pain x 3 days. Worsened with laying and standing, non-food related per se. Vomit x 1 NBNB and small. Also notes constipation (last BM yesterday) and low urine output. Denies fever and all other symptoms. Drinks alcohol approx 3 days per wk though not a lot per wife.

## 2022-02-20 NOTE — PATIENT PROFILE ADULT - LANGUAGE ASSISTANCE NEEDED
Yes-Patient/Caregiver accepts free interpretation services... no use of accessory muscles/no retractions/expansion symmetric

## 2022-02-20 NOTE — ED ADULT TRIAGE NOTE - CHIEF COMPLAINT QUOTE
Patient presents to the ED from home for abdominal pain, nausea, vomiting x1. He states he want to clinic yesterday and was sent home with prescription for antibiotics and antacids. As per patient, the doctor at clinic said he has "inflammation of pancreas." Patient is AAOx4, ambulatory. Patient presents to the ED from home for abdominal pain, nausea, vomiting x1. Mild tenderness to palpation. Patient is AAOx4, ambulatory. Patient presents to the ED from home for abdominal pain for approximately 1 day. Reports nausea, vomiting x1, unable to tolerate PO.

## 2022-02-20 NOTE — ED ADULT NURSE NOTE - CHIEF COMPLAINT QUOTE
Patient presents to the ED from home for abdominal pain for approximately 1 day. Reports nausea, vomiting x1, unable to tolerate PO.

## 2022-02-20 NOTE — H&P ADULT - NSHPREVIEWOFSYSTEMS_GEN_ALL_CORE
CONSTITUTIONAL: No weakness, fevers or chills  EYES/ENT: No visual changes;  No vertigo or throat pain   NECK: No pain or stiffness  RESPIRATORY: No cough, wheezing, hemoptysis; No shortness of breath  CARDIOVASCULAR: No chest pain or palpitations  GASTROINTESTINAL: abdominal pain. nausea, vomiting, No hematemesis; No diarrhea or constipation. No melena or hematochezia.  GENITOURINARY: No dysuria, frequency or hematuria  NEUROLOGICAL: No numbness or weakness  SKIN: No itching, burning, rashes, or lesions   All other review of systems is negative unless indicated above.

## 2022-02-20 NOTE — H&P ADULT - ASSESSMENT
ASSESSMENT AND PLAN: 32 YO with medical history of DM2 admitted to ICU for hypertriglyceridemia requiring Insulin drip     Acute Pancreatitis   Hypertriglyceridemia   Hyponatremia   Hyperglycemia   Transaminitis  Dm2  Obesity        Neuro  AAO 3 at baseline, No issues     Cardiovascular  No issues at present, Monitor for BP   Slightly on higher side, likely due to pain     Pulmonary  No issues     Infections  No issues   Monitor for fever    Nephro  Patient reports decreased urine output,, not been eating or drinking from last 3 days   s/p 2L NS bolus in ED       Gastrointestinal  #Acute Pancreatitis   Patient coming in with abdominal pain, elevated lipase, CT abdomen shows aurora pancreatic fluid and acute pancreatitis  s/p 2L NS bolus, Start on D5 1/2 NS at 100ml./hr meanwhile he is getting insulin drip   Most likely the cause is hypertriglyceridemia, patient not a heavy alcoholic   Continue IV hydration  Pain management with Morphine 2 Q6 PRN for severe, 1Q6 for moderate pain    #Transaminitis   Likely due to pancreatitis,   NPO for now   Monitor liver function    #Hypertriglyceridemia   TG 1984,   Will start on insulin drip and trend TG levels   Will hold oral diabetic medications       Heme  No issues     Endocrine  #Hypertriglyceridemia   TG 1984,   Will start on insulin drip and trend TG levels   Will hold oral diabetic medications     #Dm2  Patient on insulin drip and D5 1/2 NS   Monitor FS Q1  Hold all PO medications, confirm with pharmacy     Prophylaxis   Lovenox 40mg SQ   Protonix 40mg for GI     Disposition:  ICU     ASSESSMENT AND PLAN: 30 YO with medical history of DM2 admitted to ICU for hypertriglyceridemia requiring Insulin drip     Acute Pancreatitis   Hypertriglyceridemia   Hyponatremia   Hyperglycemia   Transaminitis  Dm2  Obesity        Neuro  AAO 3 at baseline, No issues     Cardiovascular  No issues at present, Monitor for BP   Slightly on higher side, likely due to pain     Pulmonary  No issues     Infections  No issues   Monitor for fever    Nephro  Patient reports decreased urine output,, not been eating or drinking from last 3 days   s/p 2L NS bolus in ED     #Hyponatremia   na 128, corrected for blood glucose is 132  Also a component of poor oral intake and vomiting   s/p 2L NS   Will repeat BMP     Gastrointestinal  #Acute Pancreatitis   Patient coming in with abdominal pain, elevated lipase, CT abdomen shows aurora pancreatic fluid and acute pancreatitis  s/p 2L NS bolus, Start on D5 1/2 NS at 100ml./hr meanwhile he is getting insulin drip   Most likely the cause is hypertriglyceridemia, patient not a heavy alcoholic   Continue IV hydration  Pain management with Morphine 2 Q6 PRN for severe, 1Q6 for moderate pain    #Transaminitis   Likely due to pancreatitis,   NPO for now   Monitor liver function    #Hypertriglyceridemia   TG 1984,   Will start on insulin drip and trend TG levels   Will hold oral diabetic medications       Heme  No issues     Endocrine  #Hypertriglyceridemia   TG 1984,   Will start on insulin drip and trend TG levels   Will hold oral diabetic medications     #Dm2  Patient on insulin drip and D5 1/2 NS   Monitor FS Q1  Hold all PO medications, confirm with pharmacy     Prophylaxis   Lovenox 40mg SQ   Protonix 40mg for GI     Disposition:  ICU

## 2022-02-20 NOTE — ED PROVIDER NOTE - PHYSICAL EXAMINATION
Afebrile, hemodynamically stable, saturating well  Appears uncomfortable, standing by bed, no WOB, speaking full sentences  Head NCAT  EOMI grossly, anicteric  MMM  No JVD  RRR, nml S1/S2, no m/r/g  Lungs CTAB, no w/r/r  Abd distended, diffusely TTP, no CVAT  AAO, CN's 3-12 grossly intact  STRONG spontaneously, no leg cyanosis or edema  Skin warm, well perfused, no rashes or hives

## 2022-02-20 NOTE — ED PROVIDER NOTE - CLINICAL SUMMARY MEDICAL DECISION MAKING FREE TEXT BOX
Character low suspicion for ACS, torsion, dissection, or mesenteric ischemia. Character low suspicion for ACS, torsion, dissection, or mesenteric ischemia. No urinary retention on postvoid bladder US. Character low suspicion for ACS, torsion, dissection, or mesenteric ischemia. No urinary retention on postvoid bladder US. Character c/w the pancreatitis noted by labs. D/w Dr. Jauregui of ICU, will assess. Given fluids, analgesia. NAD, nontoxic appearing.

## 2022-02-20 NOTE — H&P ADULT - NSHPPHYSICALEXAM_GEN_ALL_CORE
PHYSICAL EXAM:  GENERAL: NAD, speaks in full sentences, no signs of respiratory distress, Mild distress due to pain  HEAD:  Atraumatic, Normocephalic  EYES: EOMI, PERRLA, conjunctiva and sclera clear  NECK: Supple, No JVD  CHEST/LUNG: Clear to auscultation bilaterally; No wheeze; No crackles; No accessory muscles used  HEART: Regular rate and rhythm; No murmurs;   ABDOMEN: Soft, tender, Nondistended; Bowel sounds muffled; No guarding  EXTREMITIES:  2+ Peripheral Pulses, No cyanosis or edema  PSYCH: AAOx3  NEUROLOGY: non-focal  SKIN: No rashes or lesions
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
## 2022-02-20 NOTE — ED PROVIDER NOTE - CARE PLAN
1 Principal Discharge DX:	Acute pancreatitis  Secondary Diagnosis:	Hyperbilirubinemia  Secondary Diagnosis:	Hyperglycemia

## 2022-02-20 NOTE — H&P ADULT - ATTENDING COMMENTS
ASSESSMENT AND PLAN: 32 YO with medical history of DM2 admitted to ICU for hypertriglyceridemia requiring Insulin drip     -Acute Pancreatitis   - Hypertriglyceridemia   - Hyponatremia   - Hyperglycemia   - Transaminitis  - Dm2  - Obesity  - Active smoker   - Alcohol use         Plan   - Continue iv insulin gtt  - Q1h FS  - BMP q6h   - NPO   - PPI   - Monitor for with drawl   - Nicotine Patch   - Advise to stop smoking and using alcohol   - DVT GI prophy

## 2022-02-20 NOTE — PATIENT PROFILE ADULT - HAVE YOU HAD A FIRST COVID-19 BOOSTER?
Nora DO: Patient resting comfortably; f/u with [facility] center as instructed; will see SW to arrange outpatient services for patient. No

## 2022-02-21 LAB
ALBUMIN SERPL ELPH-MCNC: 2.4 G/DL — LOW (ref 3.5–5)
ALBUMIN SERPL ELPH-MCNC: 2.5 G/DL — LOW (ref 3.5–5)
ALP SERPL-CCNC: 64 U/L — SIGNIFICANT CHANGE UP (ref 40–120)
ALP SERPL-CCNC: 69 U/L — SIGNIFICANT CHANGE UP (ref 40–120)
ALT FLD-CCNC: 101 U/L DA — HIGH (ref 10–60)
ALT FLD-CCNC: 91 U/L DA — HIGH (ref 10–60)
ANION GAP SERPL CALC-SCNC: 5 MMOL/L — SIGNIFICANT CHANGE UP (ref 5–17)
ANION GAP SERPL CALC-SCNC: 7 MMOL/L — SIGNIFICANT CHANGE UP (ref 5–17)
ANION GAP SERPL CALC-SCNC: 7 MMOL/L — SIGNIFICANT CHANGE UP (ref 5–17)
APPEARANCE UR: CLEAR — SIGNIFICANT CHANGE UP
AST SERPL-CCNC: 24 U/L — SIGNIFICANT CHANGE UP (ref 10–40)
AST SERPL-CCNC: 27 U/L — SIGNIFICANT CHANGE UP (ref 10–40)
B PERT DNA SPEC QL NAA+PROBE: SIGNIFICANT CHANGE UP
BACTERIA # UR AUTO: ABNORMAL /HPF
BASOPHILS # BLD AUTO: 0.02 K/UL — SIGNIFICANT CHANGE UP (ref 0–0.2)
BASOPHILS # BLD AUTO: 0.04 K/UL — SIGNIFICANT CHANGE UP (ref 0–0.2)
BASOPHILS NFR BLD AUTO: 0.2 % — SIGNIFICANT CHANGE UP (ref 0–2)
BASOPHILS NFR BLD AUTO: 0.4 % — SIGNIFICANT CHANGE UP (ref 0–2)
BILIRUB SERPL-MCNC: 2.1 MG/DL — HIGH (ref 0.2–1.2)
BILIRUB SERPL-MCNC: 2.6 MG/DL — HIGH (ref 0.2–1.2)
BILIRUB UR-MCNC: ABNORMAL
BUN SERPL-MCNC: 8 MG/DL — SIGNIFICANT CHANGE UP (ref 7–18)
BUN SERPL-MCNC: 9 MG/DL — SIGNIFICANT CHANGE UP (ref 7–18)
BUN SERPL-MCNC: 9 MG/DL — SIGNIFICANT CHANGE UP (ref 7–18)
C PNEUM DNA SPEC QL NAA+PROBE: SIGNIFICANT CHANGE UP
CALCIUM SERPL-MCNC: 7.4 MG/DL — LOW (ref 8.4–10.5)
CALCIUM SERPL-MCNC: 7.4 MG/DL — LOW (ref 8.4–10.5)
CALCIUM SERPL-MCNC: 7.5 MG/DL — LOW (ref 8.4–10.5)
CHLORIDE SERPL-SCNC: 104 MMOL/L — SIGNIFICANT CHANGE UP (ref 96–108)
CHLORIDE SERPL-SCNC: 104 MMOL/L — SIGNIFICANT CHANGE UP (ref 96–108)
CHLORIDE SERPL-SCNC: 105 MMOL/L — SIGNIFICANT CHANGE UP (ref 96–108)
CO2 SERPL-SCNC: 23 MMOL/L — SIGNIFICANT CHANGE UP (ref 22–31)
COLOR SPEC: ABNORMAL
CREAT SERPL-MCNC: 0.66 MG/DL — SIGNIFICANT CHANGE UP (ref 0.5–1.3)
CREAT SERPL-MCNC: 0.72 MG/DL — SIGNIFICANT CHANGE UP (ref 0.5–1.3)
CREAT SERPL-MCNC: 0.74 MG/DL — SIGNIFICANT CHANGE UP (ref 0.5–1.3)
CULTURE RESULTS: NO GROWTH — SIGNIFICANT CHANGE UP
DIFF PNL FLD: NEGATIVE — SIGNIFICANT CHANGE UP
EOSINOPHIL # BLD AUTO: 0.03 K/UL — SIGNIFICANT CHANGE UP (ref 0–0.5)
EOSINOPHIL # BLD AUTO: 0.03 K/UL — SIGNIFICANT CHANGE UP (ref 0–0.5)
EOSINOPHIL NFR BLD AUTO: 0.3 % — SIGNIFICANT CHANGE UP (ref 0–6)
EOSINOPHIL NFR BLD AUTO: 0.3 % — SIGNIFICANT CHANGE UP (ref 0–6)
EPI CELLS # UR: ABNORMAL /HPF
FLUAV H1 2009 PAND RNA SPEC QL NAA+PROBE: SIGNIFICANT CHANGE UP
FLUAV H1 RNA SPEC QL NAA+PROBE: SIGNIFICANT CHANGE UP
FLUAV H3 RNA SPEC QL NAA+PROBE: SIGNIFICANT CHANGE UP
FLUAV SUBTYP SPEC NAA+PROBE: SIGNIFICANT CHANGE UP
FLUBV RNA SPEC QL NAA+PROBE: SIGNIFICANT CHANGE UP
GLUCOSE BLDC GLUCOMTR-MCNC: 146 MG/DL — HIGH (ref 70–99)
GLUCOSE BLDC GLUCOMTR-MCNC: 174 MG/DL — HIGH (ref 70–99)
GLUCOSE BLDC GLUCOMTR-MCNC: 181 MG/DL — HIGH (ref 70–99)
GLUCOSE BLDC GLUCOMTR-MCNC: 190 MG/DL — HIGH (ref 70–99)
GLUCOSE BLDC GLUCOMTR-MCNC: 190 MG/DL — HIGH (ref 70–99)
GLUCOSE BLDC GLUCOMTR-MCNC: 195 MG/DL — HIGH (ref 70–99)
GLUCOSE BLDC GLUCOMTR-MCNC: 199 MG/DL — HIGH (ref 70–99)
GLUCOSE BLDC GLUCOMTR-MCNC: 203 MG/DL — HIGH (ref 70–99)
GLUCOSE BLDC GLUCOMTR-MCNC: 203 MG/DL — HIGH (ref 70–99)
GLUCOSE BLDC GLUCOMTR-MCNC: 208 MG/DL — HIGH (ref 70–99)
GLUCOSE BLDC GLUCOMTR-MCNC: 249 MG/DL — HIGH (ref 70–99)
GLUCOSE BLDC GLUCOMTR-MCNC: 253 MG/DL — HIGH (ref 70–99)
GLUCOSE BLDC GLUCOMTR-MCNC: 253 MG/DL — HIGH (ref 70–99)
GLUCOSE BLDC GLUCOMTR-MCNC: 264 MG/DL — HIGH (ref 70–99)
GLUCOSE BLDC GLUCOMTR-MCNC: 269 MG/DL — HIGH (ref 70–99)
GLUCOSE BLDC GLUCOMTR-MCNC: 293 MG/DL — HIGH (ref 70–99)
GLUCOSE SERPL-MCNC: 179 MG/DL — HIGH (ref 70–99)
GLUCOSE SERPL-MCNC: 196 MG/DL — HIGH (ref 70–99)
GLUCOSE SERPL-MCNC: 264 MG/DL — HIGH (ref 70–99)
GLUCOSE UR QL: 1000 MG/DL
HADV DNA SPEC QL NAA+PROBE: SIGNIFICANT CHANGE UP
HCOV PNL SPEC NAA+PROBE: SIGNIFICANT CHANGE UP
HCT VFR BLD CALC: 44.7 % — SIGNIFICANT CHANGE UP (ref 39–50)
HCT VFR BLD CALC: 44.7 % — SIGNIFICANT CHANGE UP (ref 39–50)
HGB BLD-MCNC: 15.4 G/DL — SIGNIFICANT CHANGE UP (ref 13–17)
HGB BLD-MCNC: 15.6 G/DL — SIGNIFICANT CHANGE UP (ref 13–17)
HMPV RNA SPEC QL NAA+PROBE: SIGNIFICANT CHANGE UP
HPIV1 RNA SPEC QL NAA+PROBE: SIGNIFICANT CHANGE UP
HPIV2 RNA SPEC QL NAA+PROBE: SIGNIFICANT CHANGE UP
HPIV3 RNA SPEC QL NAA+PROBE: SIGNIFICANT CHANGE UP
HPIV4 RNA SPEC QL NAA+PROBE: SIGNIFICANT CHANGE UP
IMM GRANULOCYTES NFR BLD AUTO: 0.3 % — SIGNIFICANT CHANGE UP (ref 0–1.5)
IMM GRANULOCYTES NFR BLD AUTO: 0.4 % — SIGNIFICANT CHANGE UP (ref 0–1.5)
KETONES UR-MCNC: NEGATIVE — SIGNIFICANT CHANGE UP
LACTATE SERPL-SCNC: <0.1 MMOL/L — LOW (ref 0.7–2)
LEUKOCYTE ESTERASE UR-ACNC: NEGATIVE — SIGNIFICANT CHANGE UP
LIDOCAIN IGE QN: 1181 U/L — HIGH (ref 73–393)
LIDOCAIN IGE QN: 1691 U/L — HIGH (ref 73–393)
LYMPHOCYTES # BLD AUTO: 1.64 K/UL — SIGNIFICANT CHANGE UP (ref 1–3.3)
LYMPHOCYTES # BLD AUTO: 1.84 K/UL — SIGNIFICANT CHANGE UP (ref 1–3.3)
LYMPHOCYTES # BLD AUTO: 16.6 % — SIGNIFICANT CHANGE UP (ref 13–44)
LYMPHOCYTES # BLD AUTO: 19.4 % — SIGNIFICANT CHANGE UP (ref 13–44)
MAGNESIUM SERPL-MCNC: 1.8 MG/DL — SIGNIFICANT CHANGE UP (ref 1.6–2.6)
MCHC RBC-ENTMCNC: 27.5 PG — SIGNIFICANT CHANGE UP (ref 27–34)
MCHC RBC-ENTMCNC: 27.7 PG — SIGNIFICANT CHANGE UP (ref 27–34)
MCHC RBC-ENTMCNC: 34.5 GM/DL — SIGNIFICANT CHANGE UP (ref 32–36)
MCHC RBC-ENTMCNC: 34.9 GM/DL — SIGNIFICANT CHANGE UP (ref 32–36)
MCV RBC AUTO: 79.4 FL — LOW (ref 80–100)
MCV RBC AUTO: 79.8 FL — LOW (ref 80–100)
MONOCYTES # BLD AUTO: 0.92 K/UL — HIGH (ref 0–0.9)
MONOCYTES # BLD AUTO: 0.93 K/UL — HIGH (ref 0–0.9)
MONOCYTES NFR BLD AUTO: 9.3 % — SIGNIFICANT CHANGE UP (ref 2–14)
MONOCYTES NFR BLD AUTO: 9.8 % — SIGNIFICANT CHANGE UP (ref 2–14)
MRSA PCR RESULT.: SIGNIFICANT CHANGE UP
MRSA PCR RESULT.: SIGNIFICANT CHANGE UP
NEUTROPHILS # BLD AUTO: 6.63 K/UL — SIGNIFICANT CHANGE UP (ref 1.8–7.4)
NEUTROPHILS # BLD AUTO: 7.2 K/UL — SIGNIFICANT CHANGE UP (ref 1.8–7.4)
NEUTROPHILS NFR BLD AUTO: 70 % — SIGNIFICANT CHANGE UP (ref 43–77)
NEUTROPHILS NFR BLD AUTO: 73 % — SIGNIFICANT CHANGE UP (ref 43–77)
NITRITE UR-MCNC: NEGATIVE — SIGNIFICANT CHANGE UP
NRBC # BLD: 0 /100 WBCS — SIGNIFICANT CHANGE UP (ref 0–0)
NRBC # BLD: 0 /100 WBCS — SIGNIFICANT CHANGE UP (ref 0–0)
PH UR: 6 — SIGNIFICANT CHANGE UP (ref 5–8)
PHOSPHATE SERPL-MCNC: 2.2 MG/DL — LOW (ref 2.5–4.5)
PHOSPHATE SERPL-MCNC: 2.5 MG/DL — SIGNIFICANT CHANGE UP (ref 2.5–4.5)
PLATELET # BLD AUTO: 177 K/UL — SIGNIFICANT CHANGE UP (ref 150–400)
PLATELET # BLD AUTO: 180 K/UL — SIGNIFICANT CHANGE UP (ref 150–400)
POTASSIUM SERPL-MCNC: 3 MMOL/L — LOW (ref 3.5–5.3)
POTASSIUM SERPL-MCNC: 3.3 MMOL/L — LOW (ref 3.5–5.3)
POTASSIUM SERPL-MCNC: 3.6 MMOL/L — SIGNIFICANT CHANGE UP (ref 3.5–5.3)
POTASSIUM SERPL-SCNC: 3 MMOL/L — LOW (ref 3.5–5.3)
POTASSIUM SERPL-SCNC: 3.3 MMOL/L — LOW (ref 3.5–5.3)
POTASSIUM SERPL-SCNC: 3.6 MMOL/L — SIGNIFICANT CHANGE UP (ref 3.5–5.3)
PROCALCITONIN SERPL-MCNC: 0.37 NG/ML — HIGH (ref 0.02–0.1)
PROT SERPL-MCNC: 6.7 G/DL — SIGNIFICANT CHANGE UP (ref 6–8.3)
PROT SERPL-MCNC: 6.8 G/DL — SIGNIFICANT CHANGE UP (ref 6–8.3)
PROT UR-MCNC: 30 MG/DL
RAPID RVP RESULT: SIGNIFICANT CHANGE UP
RBC # BLD: 5.6 M/UL — SIGNIFICANT CHANGE UP (ref 4.2–5.8)
RBC # BLD: 5.63 M/UL — SIGNIFICANT CHANGE UP (ref 4.2–5.8)
RBC # FLD: 13.9 % — SIGNIFICANT CHANGE UP (ref 10.3–14.5)
RBC # FLD: 14.2 % — SIGNIFICANT CHANGE UP (ref 10.3–14.5)
RBC CASTS # UR COMP ASSIST: SIGNIFICANT CHANGE UP /HPF (ref 0–2)
RSV RNA SPEC QL NAA+PROBE: SIGNIFICANT CHANGE UP
RV+EV RNA SPEC QL NAA+PROBE: SIGNIFICANT CHANGE UP
S AUREUS DNA NOSE QL NAA+PROBE: SIGNIFICANT CHANGE UP
S AUREUS DNA NOSE QL NAA+PROBE: SIGNIFICANT CHANGE UP
SARS-COV-2 RNA SPEC QL NAA+PROBE: SIGNIFICANT CHANGE UP
SODIUM SERPL-SCNC: 132 MMOL/L — LOW (ref 135–145)
SODIUM SERPL-SCNC: 134 MMOL/L — LOW (ref 135–145)
SODIUM SERPL-SCNC: 135 MMOL/L — SIGNIFICANT CHANGE UP (ref 135–145)
SP GR SPEC: 1.02 — SIGNIFICANT CHANGE UP (ref 1.01–1.02)
SPECIMEN SOURCE: SIGNIFICANT CHANGE UP
TRIGL SERPL-MCNC: 641 MG/DL — HIGH
TRIGL SERPL-MCNC: 772 MG/DL — HIGH
UROBILINOGEN FLD QL: 1
WBC # BLD: 9.48 K/UL — SIGNIFICANT CHANGE UP (ref 3.8–10.5)
WBC # BLD: 9.87 K/UL — SIGNIFICANT CHANGE UP (ref 3.8–10.5)
WBC # FLD AUTO: 9.48 K/UL — SIGNIFICANT CHANGE UP (ref 3.8–10.5)
WBC # FLD AUTO: 9.87 K/UL — SIGNIFICANT CHANGE UP (ref 3.8–10.5)
WBC UR QL: SIGNIFICANT CHANGE UP /HPF (ref 0–5)

## 2022-02-21 PROCEDURE — 71045 X-RAY EXAM CHEST 1 VIEW: CPT | Mod: 26

## 2022-02-21 RX ORDER — OXYCODONE HYDROCHLORIDE 5 MG/1
10 TABLET ORAL EVERY 6 HOURS
Refills: 0 | Status: DISCONTINUED | OUTPATIENT
Start: 2022-02-21 | End: 2022-02-22

## 2022-02-21 RX ORDER — POTASSIUM CHLORIDE 20 MEQ
40 PACKET (EA) ORAL ONCE
Refills: 0 | Status: DISCONTINUED | OUTPATIENT
Start: 2022-02-21 | End: 2022-02-21

## 2022-02-21 RX ORDER — INSULIN GLARGINE 100 [IU]/ML
10 INJECTION, SOLUTION SUBCUTANEOUS AT BEDTIME
Refills: 0 | Status: DISCONTINUED | OUTPATIENT
Start: 2022-02-21 | End: 2022-02-22

## 2022-02-21 RX ORDER — OXYCODONE HYDROCHLORIDE 5 MG/1
10 TABLET ORAL EVERY 6 HOURS
Refills: 0 | Status: DISCONTINUED | OUTPATIENT
Start: 2022-02-21 | End: 2022-02-21

## 2022-02-21 RX ORDER — INSULIN LISPRO 100/ML
VIAL (ML) SUBCUTANEOUS
Refills: 0 | Status: DISCONTINUED | OUTPATIENT
Start: 2022-02-21 | End: 2022-03-02

## 2022-02-21 RX ORDER — ACETAMINOPHEN 500 MG
650 TABLET ORAL EVERY 6 HOURS
Refills: 0 | Status: DISCONTINUED | OUTPATIENT
Start: 2022-02-21 | End: 2022-02-22

## 2022-02-21 RX ORDER — POTASSIUM CHLORIDE 20 MEQ
10 PACKET (EA) ORAL
Refills: 0 | Status: COMPLETED | OUTPATIENT
Start: 2022-02-21 | End: 2022-02-21

## 2022-02-21 RX ORDER — PIPERACILLIN AND TAZOBACTAM 4; .5 G/20ML; G/20ML
3.38 INJECTION, POWDER, LYOPHILIZED, FOR SOLUTION INTRAVENOUS ONCE
Refills: 0 | Status: COMPLETED | OUTPATIENT
Start: 2022-02-21 | End: 2022-02-21

## 2022-02-21 RX ORDER — INSULIN HUMAN 100 [IU]/ML
3 INJECTION, SOLUTION SUBCUTANEOUS
Qty: 100 | Refills: 0 | Status: DISCONTINUED | OUTPATIENT
Start: 2022-02-21 | End: 2022-02-21

## 2022-02-21 RX ORDER — ACETAMINOPHEN 500 MG
650 TABLET ORAL ONCE
Refills: 0 | Status: COMPLETED | OUTPATIENT
Start: 2022-02-21 | End: 2022-02-21

## 2022-02-21 RX ORDER — INSULIN HUMAN 100 [IU]/ML
5 INJECTION, SOLUTION SUBCUTANEOUS
Qty: 100 | Refills: 0 | Status: DISCONTINUED | OUTPATIENT
Start: 2022-02-21 | End: 2022-02-21

## 2022-02-21 RX ORDER — SODIUM CHLORIDE 9 MG/ML
1000 INJECTION, SOLUTION INTRAVENOUS
Refills: 0 | Status: DISCONTINUED | OUTPATIENT
Start: 2022-02-21 | End: 2022-03-02

## 2022-02-21 RX ORDER — GLUCAGON INJECTION, SOLUTION 0.5 MG/.1ML
1 INJECTION, SOLUTION SUBCUTANEOUS ONCE
Refills: 0 | Status: DISCONTINUED | OUTPATIENT
Start: 2022-02-21 | End: 2022-02-22

## 2022-02-21 RX ORDER — POTASSIUM PHOSPHATE, MONOBASIC POTASSIUM PHOSPHATE, DIBASIC 236; 224 MG/ML; MG/ML
30 INJECTION, SOLUTION INTRAVENOUS ONCE
Refills: 0 | Status: COMPLETED | OUTPATIENT
Start: 2022-02-21 | End: 2022-02-21

## 2022-02-21 RX ORDER — HYDROMORPHONE HYDROCHLORIDE 2 MG/ML
2 INJECTION INTRAMUSCULAR; INTRAVENOUS; SUBCUTANEOUS EVERY 4 HOURS
Refills: 0 | Status: DISCONTINUED | OUTPATIENT
Start: 2022-02-21 | End: 2022-02-23

## 2022-02-21 RX ORDER — GEMFIBROZIL 600 MG
600 TABLET ORAL EVERY 12 HOURS
Refills: 0 | Status: DISCONTINUED | OUTPATIENT
Start: 2022-02-21 | End: 2022-03-02

## 2022-02-21 RX ORDER — PIPERACILLIN AND TAZOBACTAM 4; .5 G/20ML; G/20ML
3.38 INJECTION, POWDER, LYOPHILIZED, FOR SOLUTION INTRAVENOUS EVERY 8 HOURS
Refills: 0 | Status: DISCONTINUED | OUTPATIENT
Start: 2022-02-21 | End: 2022-02-21

## 2022-02-21 RX ADMIN — Medication 4: at 16:27

## 2022-02-21 RX ADMIN — HYDROMORPHONE HYDROCHLORIDE 2 MILLIGRAM(S): 2 INJECTION INTRAMUSCULAR; INTRAVENOUS; SUBCUTANEOUS at 05:33

## 2022-02-21 RX ADMIN — Medication 100 MILLIEQUIVALENT(S): at 09:14

## 2022-02-21 RX ADMIN — OXYCODONE HYDROCHLORIDE 10 MILLIGRAM(S): 5 TABLET ORAL at 20:26

## 2022-02-21 RX ADMIN — HYDROMORPHONE HYDROCHLORIDE 2 MILLIGRAM(S): 2 INJECTION INTRAMUSCULAR; INTRAVENOUS; SUBCUTANEOUS at 09:28

## 2022-02-21 RX ADMIN — Medication 650 MILLIGRAM(S): at 20:02

## 2022-02-21 RX ADMIN — OXYCODONE HYDROCHLORIDE 10 MILLIGRAM(S): 5 TABLET ORAL at 15:34

## 2022-02-21 RX ADMIN — PIPERACILLIN AND TAZOBACTAM 200 GRAM(S): 4; .5 INJECTION, POWDER, LYOPHILIZED, FOR SOLUTION INTRAVENOUS at 03:05

## 2022-02-21 RX ADMIN — Medication 600 MILLIGRAM(S): at 17:29

## 2022-02-21 RX ADMIN — OXYCODONE HYDROCHLORIDE 10 MILLIGRAM(S): 5 TABLET ORAL at 09:28

## 2022-02-21 RX ADMIN — Medication 650 MILLIGRAM(S): at 11:33

## 2022-02-21 RX ADMIN — INSULIN GLARGINE 10 UNIT(S): 100 INJECTION, SOLUTION SUBCUTANEOUS at 21:05

## 2022-02-21 RX ADMIN — HYDROMORPHONE HYDROCHLORIDE 2 MILLIGRAM(S): 2 INJECTION INTRAMUSCULAR; INTRAVENOUS; SUBCUTANEOUS at 19:49

## 2022-02-21 RX ADMIN — Medication 650 MILLIGRAM(S): at 20:52

## 2022-02-21 RX ADMIN — HYDROMORPHONE HYDROCHLORIDE 2 MILLIGRAM(S): 2 INJECTION INTRAMUSCULAR; INTRAVENOUS; SUBCUTANEOUS at 06:03

## 2022-02-21 RX ADMIN — Medication 4: at 11:34

## 2022-02-21 RX ADMIN — HYDROMORPHONE HYDROCHLORIDE 2 MILLIGRAM(S): 2 INJECTION INTRAMUSCULAR; INTRAVENOUS; SUBCUTANEOUS at 18:13

## 2022-02-21 RX ADMIN — SODIUM CHLORIDE 100 MILLILITER(S): 9 INJECTION, SOLUTION INTRAVENOUS at 05:36

## 2022-02-21 RX ADMIN — Medication 100 MILLIEQUIVALENT(S): at 05:33

## 2022-02-21 RX ADMIN — INSULIN HUMAN 3 UNIT(S)/HR: 100 INJECTION, SOLUTION SUBCUTANEOUS at 02:03

## 2022-02-21 RX ADMIN — Medication 650 MILLIGRAM(S): at 01:56

## 2022-02-21 RX ADMIN — Medication 650 MILLIGRAM(S): at 12:59

## 2022-02-21 RX ADMIN — HYDROMORPHONE HYDROCHLORIDE 2 MILLIGRAM(S): 2 INJECTION INTRAMUSCULAR; INTRAVENOUS; SUBCUTANEOUS at 01:23

## 2022-02-21 RX ADMIN — HYDROMORPHONE HYDROCHLORIDE 2 MILLIGRAM(S): 2 INJECTION INTRAMUSCULAR; INTRAVENOUS; SUBCUTANEOUS at 13:53

## 2022-02-21 RX ADMIN — CHLORHEXIDINE GLUCONATE 1 APPLICATION(S): 213 SOLUTION TOPICAL at 09:14

## 2022-02-21 RX ADMIN — PANTOPRAZOLE SODIUM 40 MILLIGRAM(S): 20 TABLET, DELAYED RELEASE ORAL at 11:34

## 2022-02-21 RX ADMIN — Medication 650 MILLIGRAM(S): at 02:30

## 2022-02-21 RX ADMIN — HYDROMORPHONE HYDROCHLORIDE 2 MILLIGRAM(S): 2 INJECTION INTRAMUSCULAR; INTRAVENOUS; SUBCUTANEOUS at 08:29

## 2022-02-21 RX ADMIN — INSULIN HUMAN 5 UNIT(S)/HR: 100 INJECTION, SOLUTION SUBCUTANEOUS at 08:30

## 2022-02-21 RX ADMIN — HYDROMORPHONE HYDROCHLORIDE 2 MILLIGRAM(S): 2 INJECTION INTRAMUSCULAR; INTRAVENOUS; SUBCUTANEOUS at 15:34

## 2022-02-21 RX ADMIN — OXYCODONE HYDROCHLORIDE 10 MILLIGRAM(S): 5 TABLET ORAL at 08:29

## 2022-02-21 RX ADMIN — PIPERACILLIN AND TAZOBACTAM 25 GRAM(S): 4; .5 INJECTION, POWDER, LYOPHILIZED, FOR SOLUTION INTRAVENOUS at 10:21

## 2022-02-21 RX ADMIN — Medication 100 MILLIEQUIVALENT(S): at 07:14

## 2022-02-21 RX ADMIN — OXYCODONE HYDROCHLORIDE 10 MILLIGRAM(S): 5 TABLET ORAL at 13:53

## 2022-02-21 RX ADMIN — POTASSIUM PHOSPHATE, MONOBASIC POTASSIUM PHOSPHATE, DIBASIC 83.33 MILLIMOLE(S): 236; 224 INJECTION, SOLUTION INTRAVENOUS at 08:30

## 2022-02-21 RX ADMIN — ENOXAPARIN SODIUM 40 MILLIGRAM(S): 100 INJECTION SUBCUTANEOUS at 11:33

## 2022-02-21 RX ADMIN — OXYCODONE HYDROCHLORIDE 10 MILLIGRAM(S): 5 TABLET ORAL at 20:02

## 2022-02-21 NOTE — PROGRESS NOTE ADULT - SUBJECTIVE AND OBJECTIVE BOX
INTERVAL HPI/OVERNIGHT EVENTS: ***    PRESSORS: [ ] YES [ ] NO  WHICH:    Antimicrobial:  piperacillin/tazobactam IVPB.. 3.375 Gram(s) IV Intermittent every 8 hours    Cardiovascular:    Pulmonary:    Hematalogic:  enoxaparin Injectable 40 milliGRAM(s) SubCutaneous daily    Other:  chlorhexidine 2% Cloths 1 Application(s) Topical <User Schedule>  dextrose 5% + sodium chloride 0.9% 1000 milliLiter(s) IV Continuous <Continuous>  HYDROmorphone  Injectable 2 milliGRAM(s) IV Push every 4 hours  insulin regular Infusion 5 Unit(s)/Hr IV Continuous <Continuous>  morphine  - Injectable 1 milliGRAM(s) IV Push every 6 hours PRN  pantoprazole  Injectable 40 milliGRAM(s) IV Push daily  potassium chloride  10 mEq/100 mL IVPB 10 milliEquivalent(s) IV Intermittent every 1 hour  potassium phosphate IVPB 30 milliMole(s) IV Intermittent once    chlorhexidine 2% Cloths 1 Application(s) Topical <User Schedule>  dextrose 5% + sodium chloride 0.9% 1000 milliLiter(s) IV Continuous <Continuous>  enoxaparin Injectable 40 milliGRAM(s) SubCutaneous daily  HYDROmorphone  Injectable 2 milliGRAM(s) IV Push every 4 hours  insulin regular Infusion 5 Unit(s)/Hr IV Continuous <Continuous>  morphine  - Injectable 1 milliGRAM(s) IV Push every 6 hours PRN  pantoprazole  Injectable 40 milliGRAM(s) IV Push daily  piperacillin/tazobactam IVPB.. 3.375 Gram(s) IV Intermittent every 8 hours  potassium chloride  10 mEq/100 mL IVPB 10 milliEquivalent(s) IV Intermittent every 1 hour  potassium phosphate IVPB 30 milliMole(s) IV Intermittent once    Drug Dosing Weight  Height (cm): 170.2 (2022 04:18)  Weight (kg): 107 (2022 04:18)  BMI (kg/m2): 36.9 (2022 04:18)  BSA (m2): 2.17 (2022 04:18)    CENTRAL LINE: [ ] YES [ ] NO  LOCATION:   DATE INSERTED:  REMOVE: [ ] YES [ ] NO  EXPLAIN:    DUNN: [ ] YES [ ] NO    DATE INSERTED:  REMOVE:  [ ] YES [ ] NO  EXPLAIN:    A-LINE:  [ ] YES [ ] NO  LOCATION:   DATE INSERTED:  REMOVE:  [ ] YES [ ] NO  EXPLAIN:    PMH -reviewed admission note, no change since admission  PAST MEDICAL & SURGICAL HISTORY:  Diabetes        ICU Vital Signs Last 24 Hrs  T(C): 36.5 (2022 04:22), Max: 38.6 (2022 23:36)  T(F): 97.7 (2022 04:22), Max: 101.4 (2022 23:36)  HR: 103 (2022 06:00) (90 - 118)  BP: 119/77 (2022 06:00) (119/77 - 147/92)  BP(mean): 86 (2022 06:00) (74 - 102)  ABP: --  ABP(mean): --  RR: 26 (2022 06:00) (24 - 34)  SpO2: 96% (2022 06:00) (88% - 99%)             @ 07:01  -  02- @ 07:00  --------------------------------------------------------  IN: 291 mL / OUT: 900 mL / NET: -609 mL            PHYSICAL EXAM:    GENERAL: NAD, well-groomed, well-developed  HEAD:  Atraumatic, Normocephalic  EYES: EOMI, PERRLA, conjunctiva and sclera clear  ENMT: No tonsillar erythema, exudates, or enlargement; Moist mucous membranes, Good dentition, No lesions  NECK: Supple, normal appearance, No JVD; Normal thyroid; Trachea midline  NERVOUS SYSTEM:  Alert & Oriented X3,  Motor Strength 5/5 B/L upper and lower extremities; DTRs 2+ intact and symmetric  CHEST/LUNG: No chest deformity; Normal percussion bilaterally; No rales, rhonchi, wheezing   HEART: Regular rate and rhythm; No murmurs, rubs, or gallops  ABDOMEN: Soft, Nontender, Nondistended; Bowel sounds present  EXTREMITIES:  2+ Peripheral Pulses, No clubbing, cyanosis, or edema  LYMPH: No lymphadenopathy noted  SKIN: No rashes or lesions;  Good capillary refill      LABS:  CBC Full  -  ( 2022 05:02 )  WBC Count : 9.48 K/uL  RBC Count : 5.63 M/uL  Hemoglobin : 15.6 g/dL  Hematocrit : 44.7 %  Platelet Count - Automated : 177 K/uL  Mean Cell Volume : 79.4 fl  Mean Cell Hemoglobin : 27.7 pg  Mean Cell Hemoglobin Concentration : 34.9 gm/dL  Auto Neutrophil # : 6.63 K/uL  Auto Lymphocyte # : 1.84 K/uL  Auto Monocyte # : 0.93 K/uL  Auto Eosinophil # : 0.03 K/uL  Auto Basophil # : 0.02 K/uL  Auto Neutrophil % : 70.0 %  Auto Lymphocyte % : 19.4 %  Auto Monocyte % : 9.8 %  Auto Eosinophil % : 0.3 %  Auto Basophil % : 0.2 %        134<L>  |  104  |  9   ----------------------------<  196<H>  3.0<L>   |  23  |  0.74    Ca    7.5<L>      2022 05:02  Phos  2.2     -  Mg     1.8         TPro  6.7  /  Alb  2.4<L>  /  TBili  2.6<H>  /  DBili  x   /  AST  24  /  ALT  91<H>  /  AlkPhos  64        Urinalysis Basic - ( 2022 05:14 )    Color: Yellow / Appearance: Clear / S.020 / pH: x  Gluc: x / Ketone: Moderate  / Bili: Negative / Urobili: Negative   Blood: x / Protein: 30 mg/dL / Nitrite: Negative   Leuk Esterase: Negative / RBC: 0-2 /HPF / WBC 0-2 /HPF   Sq Epi: x / Non Sq Epi: Few /HPF / Bacteria: Few /HPF          RADIOLOGY & ADDITIONAL STUDIES REVIEWED:  ***    [ ]GOALS OF CARE DISCUSSION WITH PATIENT/FAMILY/PROXY:    CRITICAL CARE TIME SPENT: 35 minutes INTERVAL HPI/OVERNIGHT EVENTS: Pt having fever.  Seen and examined at bedside, endorses abdominal pain slightly worse than yesterday.     PRESSORS: [ ] YES [x ] NO  WHICH:    Antimicrobial:  piperacillin/tazobactam IVPB.. 3.375 Gram(s) IV Intermittent every 8 hours    Cardiovascular:    Pulmonary:    Hematalogic:  enoxaparin Injectable 40 milliGRAM(s) SubCutaneous daily    Other:  chlorhexidine 2% Cloths 1 Application(s) Topical <User Schedule>  dextrose 5% + sodium chloride 0.9% 1000 milliLiter(s) IV Continuous <Continuous>  HYDROmorphone  Injectable 2 milliGRAM(s) IV Push every 4 hours  insulin regular Infusion 5 Unit(s)/Hr IV Continuous <Continuous>  morphine  - Injectable 1 milliGRAM(s) IV Push every 6 hours PRN  pantoprazole  Injectable 40 milliGRAM(s) IV Push daily  potassium chloride  10 mEq/100 mL IVPB 10 milliEquivalent(s) IV Intermittent every 1 hour  potassium phosphate IVPB 30 milliMole(s) IV Intermittent once    chlorhexidine 2% Cloths 1 Application(s) Topical <User Schedule>  dextrose 5% + sodium chloride 0.9% 1000 milliLiter(s) IV Continuous <Continuous>  enoxaparin Injectable 40 milliGRAM(s) SubCutaneous daily  HYDROmorphone  Injectable 2 milliGRAM(s) IV Push every 4 hours  insulin regular Infusion 5 Unit(s)/Hr IV Continuous <Continuous>  morphine  - Injectable 1 milliGRAM(s) IV Push every 6 hours PRN  pantoprazole  Injectable 40 milliGRAM(s) IV Push daily  piperacillin/tazobactam IVPB.. 3.375 Gram(s) IV Intermittent every 8 hours  potassium chloride  10 mEq/100 mL IVPB 10 milliEquivalent(s) IV Intermittent every 1 hour  potassium phosphate IVPB 30 milliMole(s) IV Intermittent once    Drug Dosing Weight  Height (cm): 170.2 (2022 04:18)  Weight (kg): 107 (2022 04:18)  BMI (kg/m2): 36.9 (2022 04:18)  BSA (m2): 2.17 (2022 04:18)    CENTRAL LINE: [ ] YES [x ] NO  LOCATION:   DATE INSERTED:  REMOVE: [ ] YES [ ] NO  EXPLAIN:    DUNN: [ ] YES [x ] NO    DATE INSERTED:  REMOVE:  [ ] YES [ ] NO  EXPLAIN:    A-LINE:  [ ] YES [x ] NO  LOCATION:   DATE INSERTED:  REMOVE:  [ ] YES [ ] NO  EXPLAIN:    PMH -reviewed admission note, no change since admission  PAST MEDICAL & SURGICAL HISTORY:  Diabetes        ICU Vital Signs Last 24 Hrs  T(C): 36.5 (2022 04:22), Max: 38.6 (2022 23:36)  T(F): 97.7 (2022 04:22), Max: 101.4 (2022 23:36)  HR: 103 (2022 06:00) (90 - 118)  BP: 119/77 (2022 06:00) (119/77 - 147/92)  BP(mean): 86 (2022 06:00) (74 - 102)  ABP: --  ABP(mean): --  RR: 26 (2022 06:00) (24 - 34)  SpO2: 96% (2022 06:00) (88% - 99%)             @ 07:01  -  - @ 07:00  --------------------------------------------------------  IN: 291 mL / OUT: 900 mL / NET: -609 mL            PHYSICAL EXAM:    GENERAL: NAD, obese  HEAD:  Atraumatic, Normocephalic  EYES: EOMI, PERRLA, conjunctiva and sclera clear  ENMT: No tonsillar erythema, exudates, or enlargement; Moist mucous membranes  NECK: Supple, normal appearance, No JVD;  Trachea midline  NERVOUS SYSTEM:  Alert & Oriented X3,  Motor Strength 5/5 B/L upper and lower extremities; DTRs 2+ intact and symmetric  CHEST/LUNG: No chest deformity; Normal percussion bilaterally; No rales, rhonchi, wheezing   HEART: Regular rate and rhythm; No murmurs, rubs, or gallops  ABDOMEN: Increased abdominal girth,  soft,  mildly tender (diffuse pattern), Nondistended; Bowel sounds present  EXTREMITIES:  2+ Peripheral Pulses, No clubbing, cyanosis, or edema  SKIN: No rashes or lesions;  Good capillary refill      LABS:  CBC Full  -  ( 2022 05:02 )  WBC Count : 9.48 K/uL  RBC Count : 5.63 M/uL  Hemoglobin : 15.6 g/dL  Hematocrit : 44.7 %  Platelet Count - Automated : 177 K/uL  Mean Cell Volume : 79.4 fl  Mean Cell Hemoglobin : 27.7 pg  Mean Cell Hemoglobin Concentration : 34.9 gm/dL  Auto Neutrophil # : 6.63 K/uL  Auto Lymphocyte # : 1.84 K/uL  Auto Monocyte # : 0.93 K/uL  Auto Eosinophil # : 0.03 K/uL  Auto Basophil # : 0.02 K/uL  Auto Neutrophil % : 70.0 %  Auto Lymphocyte % : 19.4 %  Auto Monocyte % : 9.8 %  Auto Eosinophil % : 0.3 %  Auto Basophil % : 0.2 %        134<L>  |  104  |  9   ----------------------------<  196<H>  3.0<L>   |  23  |  0.74    Ca    7.5<L>      2022 05:02  Phos  2.2     -  Mg     1.8         TPro  6.7  /  Alb  2.4<L>  /  TBili  2.6<H>  /  DBili  x   /  AST  24  /  ALT  91<H>  /  AlkPhos  64  -      Urinalysis Basic - ( 2022 05:14 )    Color: Yellow / Appearance: Clear / S.020 / pH: x  Gluc: x / Ketone: Moderate  / Bili: Negative / Urobili: Negative   Blood: x / Protein: 30 mg/dL / Nitrite: Negative   Leuk Esterase: Negative / RBC: 0-2 /HPF / WBC 0-2 /HPF   Sq Epi: x / Non Sq Epi: Few /HPF / Bacteria: Few /HPF          RADIOLOGY & ADDITIONAL STUDIES REVIEWED:  ***    [ ]GOALS OF CARE DISCUSSION WITH PATIENT/FAMILY/PROXY:    CRITICAL CARE TIME SPENT: 35 minutes

## 2022-02-21 NOTE — PROGRESS NOTE ADULT - ASSESSMENT
ASSESSMENT AND PLAN: 30 YO with medical history of DM2 admitted to ICU for hypertriglyceridemia requiring Insulin drip     Acute Pancreatitis   Hypertriglyceridemia   Hyponatremia   Hyperglycemia   Transaminitis  Dm2  Obesity        Neuro  AAO 3 at baseline, No issues     Cardiovascular  No issues at present  normotensive    Pulmonary  Requiring O2 NC 2L     Infections  No issues       Nephro  Patient reports decreased urine output,, not been eating or drinking from last 3 days   Fluid balance -100      #Hyponatremia   na 128, corrected for blood glucose is 132  Also a component of poor oral intake and vomiting   resolved  monitor BMP     # Hypokalemia  replaced today  f/u BMP    Gastrointestinal  #Acute Pancreatitis   Patient coming in with abdominal pain, elevated lipase, CT abdomen shows aurora pancreatic fluid and acute pancreatitis  Most likely the cause is hypertriglyceridemia and alcoholism   s/p 2L NS bolus, later switched to D5 1/2 NS at 100ml./hr  Given Lipase and TGC trending down, switched insulin drip to lantus 10 U at bedtime + SSI (mod scale)  monitor FS q 1 hr  started on clear liquid diet   Pain management with Oxycodone + Dilaudid PRN    #Transaminitis   Likely due to pancreatitis,   LFT stable  Monitor liver function          Heme  No issues     Endocrine  #Hypertriglyceridemia   Trending down  started on Gemfibrozil 600 mg q 12 hrs      #Dm2  On Janumet at home  Patient on insulin drip and D5 1/2 NS, switched today to lantus + SSI  Monitor FS Q1 given risk of hypoglycemia  f/u A1C tomorrow      Prophylaxis   Lovenox 40mg SQ   Protonix 40mg for GI     Disposition:  continue in ICU for close monitoring

## 2022-02-22 LAB
A1C WITH ESTIMATED AVERAGE GLUCOSE RESULT: 9.5 % — HIGH (ref 4–5.6)
ALBUMIN SERPL ELPH-MCNC: 2 G/DL — LOW (ref 3.5–5)
ALP SERPL-CCNC: 60 U/L — SIGNIFICANT CHANGE UP (ref 40–120)
ALT FLD-CCNC: 63 U/L DA — HIGH (ref 10–60)
ANION GAP SERPL CALC-SCNC: 9 MMOL/L — SIGNIFICANT CHANGE UP (ref 5–17)
AST SERPL-CCNC: 26 U/L — SIGNIFICANT CHANGE UP (ref 10–40)
BASOPHILS # BLD AUTO: 0 K/UL — SIGNIFICANT CHANGE UP (ref 0–0.2)
BASOPHILS NFR BLD AUTO: 0 % — SIGNIFICANT CHANGE UP (ref 0–2)
BILIRUB SERPL-MCNC: 4.5 MG/DL — HIGH (ref 0.2–1.2)
BUN SERPL-MCNC: 9 MG/DL — SIGNIFICANT CHANGE UP (ref 7–18)
CALCIUM SERPL-MCNC: 7.9 MG/DL — LOW (ref 8.4–10.5)
CHLORIDE SERPL-SCNC: 101 MMOL/L — SIGNIFICANT CHANGE UP (ref 96–108)
CO2 SERPL-SCNC: 23 MMOL/L — SIGNIFICANT CHANGE UP (ref 22–31)
CREAT SERPL-MCNC: 0.68 MG/DL — SIGNIFICANT CHANGE UP (ref 0.5–1.3)
EOSINOPHIL # BLD AUTO: 0 K/UL — SIGNIFICANT CHANGE UP (ref 0–0.5)
EOSINOPHIL NFR BLD AUTO: 0 % — SIGNIFICANT CHANGE UP (ref 0–6)
ESTIMATED AVERAGE GLUCOSE: 226 MG/DL — HIGH (ref 68–114)
GLUCOSE BLDC GLUCOMTR-MCNC: 170 MG/DL — HIGH (ref 70–99)
GLUCOSE BLDC GLUCOMTR-MCNC: 198 MG/DL — HIGH (ref 70–99)
GLUCOSE BLDC GLUCOMTR-MCNC: 200 MG/DL — HIGH (ref 70–99)
GLUCOSE BLDC GLUCOMTR-MCNC: 201 MG/DL — HIGH (ref 70–99)
GLUCOSE SERPL-MCNC: 205 MG/DL — HIGH (ref 70–99)
HCT VFR BLD CALC: 39.4 % — SIGNIFICANT CHANGE UP (ref 39–50)
HGB BLD-MCNC: 13.1 G/DL — SIGNIFICANT CHANGE UP (ref 13–17)
LG PLATELETS BLD QL AUTO: SLIGHT — SIGNIFICANT CHANGE UP
LIDOCAIN IGE QN: 388 U/L — SIGNIFICANT CHANGE UP (ref 73–393)
LYMPHOCYTES # BLD AUTO: 1.9 K/UL — SIGNIFICANT CHANGE UP (ref 1–3.3)
LYMPHOCYTES # BLD AUTO: 20 % — SIGNIFICANT CHANGE UP (ref 13–44)
MAGNESIUM SERPL-MCNC: 1.8 MG/DL — SIGNIFICANT CHANGE UP (ref 1.6–2.6)
MANUAL SMEAR VERIFICATION: SIGNIFICANT CHANGE UP
MCHC RBC-ENTMCNC: 27.3 PG — SIGNIFICANT CHANGE UP (ref 27–34)
MCHC RBC-ENTMCNC: 33.2 GM/DL — SIGNIFICANT CHANGE UP (ref 32–36)
MCV RBC AUTO: 82.3 FL — SIGNIFICANT CHANGE UP (ref 80–100)
MONOCYTES # BLD AUTO: 1.62 K/UL — HIGH (ref 0–0.9)
MONOCYTES NFR BLD AUTO: 17 % — HIGH (ref 2–14)
NEUTROPHILS # BLD AUTO: 5.9 K/UL — SIGNIFICANT CHANGE UP (ref 1.8–7.4)
NEUTROPHILS NFR BLD AUTO: 56 % — SIGNIFICANT CHANGE UP (ref 43–77)
NEUTS BAND # BLD: 6 % — SIGNIFICANT CHANGE UP (ref 0–8)
NRBC # BLD: 0 /100 — SIGNIFICANT CHANGE UP (ref 0–0)
PHOSPHATE SERPL-MCNC: 1.9 MG/DL — LOW (ref 2.5–4.5)
PLAT MORPH BLD: NORMAL — SIGNIFICANT CHANGE UP
PLATELET # BLD AUTO: 152 K/UL — SIGNIFICANT CHANGE UP (ref 150–400)
POLYCHROMASIA BLD QL SMEAR: SLIGHT — SIGNIFICANT CHANGE UP
POTASSIUM SERPL-MCNC: 3.3 MMOL/L — LOW (ref 3.5–5.3)
POTASSIUM SERPL-SCNC: 3.3 MMOL/L — LOW (ref 3.5–5.3)
PROT SERPL-MCNC: 6.3 G/DL — SIGNIFICANT CHANGE UP (ref 6–8.3)
RBC # BLD: 4.79 M/UL — SIGNIFICANT CHANGE UP (ref 4.2–5.8)
RBC # FLD: 13.8 % — SIGNIFICANT CHANGE UP (ref 10.3–14.5)
RBC BLD AUTO: ABNORMAL
SODIUM SERPL-SCNC: 133 MMOL/L — LOW (ref 135–145)
TRIGL SERPL-MCNC: 408 MG/DL — HIGH
VARIANT LYMPHS # BLD: 1 % — SIGNIFICANT CHANGE UP (ref 0–6)
WBC # BLD: 9.52 K/UL — SIGNIFICANT CHANGE UP (ref 3.8–10.5)
WBC # FLD AUTO: 9.52 K/UL — SIGNIFICANT CHANGE UP (ref 3.8–10.5)

## 2022-02-22 RX ORDER — OXYCODONE HYDROCHLORIDE 5 MG/1
10 TABLET ORAL EVERY 6 HOURS
Refills: 0 | Status: DISCONTINUED | OUTPATIENT
Start: 2022-02-22 | End: 2022-02-23

## 2022-02-22 RX ORDER — ACETAMINOPHEN 500 MG
650 TABLET ORAL EVERY 6 HOURS
Refills: 0 | Status: DISCONTINUED | OUTPATIENT
Start: 2022-02-22 | End: 2022-02-23

## 2022-02-22 RX ORDER — POTASSIUM CHLORIDE 20 MEQ
40 PACKET (EA) ORAL EVERY 4 HOURS
Refills: 0 | Status: COMPLETED | OUTPATIENT
Start: 2022-02-22 | End: 2022-02-22

## 2022-02-22 RX ORDER — SODIUM,POTASSIUM PHOSPHATES 278-250MG
1 POWDER IN PACKET (EA) ORAL EVERY 12 HOURS
Refills: 0 | Status: COMPLETED | OUTPATIENT
Start: 2022-02-22 | End: 2022-02-23

## 2022-02-22 RX ORDER — SODIUM CHLORIDE 9 MG/ML
1000 INJECTION, SOLUTION INTRAVENOUS
Refills: 0 | Status: DISCONTINUED | OUTPATIENT
Start: 2022-02-22 | End: 2022-02-22

## 2022-02-22 RX ORDER — GLUCAGON INJECTION, SOLUTION 0.5 MG/.1ML
1 INJECTION, SOLUTION SUBCUTANEOUS ONCE
Refills: 0 | Status: DISCONTINUED | OUTPATIENT
Start: 2022-02-22 | End: 2022-03-02

## 2022-02-22 RX ORDER — INSULIN GLARGINE 100 [IU]/ML
15 INJECTION, SOLUTION SUBCUTANEOUS AT BEDTIME
Refills: 0 | Status: DISCONTINUED | OUTPATIENT
Start: 2022-02-22 | End: 2022-02-23

## 2022-02-22 RX ORDER — INSULIN LISPRO 100/ML
2 VIAL (ML) SUBCUTANEOUS
Refills: 0 | Status: DISCONTINUED | OUTPATIENT
Start: 2022-02-22 | End: 2022-02-23

## 2022-02-22 RX ADMIN — ENOXAPARIN SODIUM 40 MILLIGRAM(S): 100 INJECTION SUBCUTANEOUS at 11:00

## 2022-02-22 RX ADMIN — HYDROMORPHONE HYDROCHLORIDE 2 MILLIGRAM(S): 2 INJECTION INTRAMUSCULAR; INTRAVENOUS; SUBCUTANEOUS at 18:17

## 2022-02-22 RX ADMIN — OXYCODONE HYDROCHLORIDE 10 MILLIGRAM(S): 5 TABLET ORAL at 23:00

## 2022-02-22 RX ADMIN — Medication 650 MILLIGRAM(S): at 23:42

## 2022-02-22 RX ADMIN — PANTOPRAZOLE SODIUM 40 MILLIGRAM(S): 20 TABLET, DELAYED RELEASE ORAL at 11:00

## 2022-02-22 RX ADMIN — HYDROMORPHONE HYDROCHLORIDE 2 MILLIGRAM(S): 2 INJECTION INTRAMUSCULAR; INTRAVENOUS; SUBCUTANEOUS at 00:31

## 2022-02-22 RX ADMIN — Medication 1 PACKET(S): at 07:28

## 2022-02-22 RX ADMIN — HYDROMORPHONE HYDROCHLORIDE 2 MILLIGRAM(S): 2 INJECTION INTRAMUSCULAR; INTRAVENOUS; SUBCUTANEOUS at 14:31

## 2022-02-22 RX ADMIN — Medication 650 MILLIGRAM(S): at 13:14

## 2022-02-22 RX ADMIN — HYDROMORPHONE HYDROCHLORIDE 2 MILLIGRAM(S): 2 INJECTION INTRAMUSCULAR; INTRAVENOUS; SUBCUTANEOUS at 10:48

## 2022-02-22 RX ADMIN — HYDROMORPHONE HYDROCHLORIDE 2 MILLIGRAM(S): 2 INJECTION INTRAMUSCULAR; INTRAVENOUS; SUBCUTANEOUS at 05:57

## 2022-02-22 RX ADMIN — Medication 650 MILLIGRAM(S): at 18:14

## 2022-02-22 RX ADMIN — OXYCODONE HYDROCHLORIDE 10 MILLIGRAM(S): 5 TABLET ORAL at 14:23

## 2022-02-22 RX ADMIN — HYDROMORPHONE HYDROCHLORIDE 2 MILLIGRAM(S): 2 INJECTION INTRAMUSCULAR; INTRAVENOUS; SUBCUTANEOUS at 13:14

## 2022-02-22 RX ADMIN — Medication 40 MILLIEQUIVALENT(S): at 10:49

## 2022-02-22 RX ADMIN — OXYCODONE HYDROCHLORIDE 10 MILLIGRAM(S): 5 TABLET ORAL at 10:47

## 2022-02-22 RX ADMIN — CHLORHEXIDINE GLUCONATE 1 APPLICATION(S): 213 SOLUTION TOPICAL at 05:13

## 2022-02-22 RX ADMIN — Medication 600 MILLIGRAM(S): at 18:14

## 2022-02-22 RX ADMIN — INSULIN GLARGINE 15 UNIT(S): 100 INJECTION, SOLUTION SUBCUTANEOUS at 21:41

## 2022-02-22 RX ADMIN — OXYCODONE HYDROCHLORIDE 10 MILLIGRAM(S): 5 TABLET ORAL at 13:52

## 2022-02-22 RX ADMIN — OXYCODONE HYDROCHLORIDE 10 MILLIGRAM(S): 5 TABLET ORAL at 02:06

## 2022-02-22 RX ADMIN — HYDROMORPHONE HYDROCHLORIDE 2 MILLIGRAM(S): 2 INJECTION INTRAMUSCULAR; INTRAVENOUS; SUBCUTANEOUS at 23:12

## 2022-02-22 RX ADMIN — HYDROMORPHONE HYDROCHLORIDE 2 MILLIGRAM(S): 2 INJECTION INTRAMUSCULAR; INTRAVENOUS; SUBCUTANEOUS at 00:15

## 2022-02-22 RX ADMIN — OXYCODONE HYDROCHLORIDE 10 MILLIGRAM(S): 5 TABLET ORAL at 21:42

## 2022-02-22 RX ADMIN — Medication 650 MILLIGRAM(S): at 02:06

## 2022-02-22 RX ADMIN — Medication 650 MILLIGRAM(S): at 23:12

## 2022-02-22 RX ADMIN — Medication 650 MILLIGRAM(S): at 04:55

## 2022-02-22 RX ADMIN — Medication 2: at 16:25

## 2022-02-22 RX ADMIN — OXYCODONE HYDROCHLORIDE 10 MILLIGRAM(S): 5 TABLET ORAL at 03:36

## 2022-02-22 RX ADMIN — Medication 650 MILLIGRAM(S): at 10:48

## 2022-02-22 RX ADMIN — Medication 2: at 10:59

## 2022-02-22 RX ADMIN — Medication 1 PACKET(S): at 18:13

## 2022-02-22 RX ADMIN — HYDROMORPHONE HYDROCHLORIDE 2 MILLIGRAM(S): 2 INJECTION INTRAMUSCULAR; INTRAVENOUS; SUBCUTANEOUS at 18:13

## 2022-02-22 RX ADMIN — Medication 650 MILLIGRAM(S): at 18:17

## 2022-02-22 RX ADMIN — Medication 40 MILLIEQUIVALENT(S): at 07:28

## 2022-02-22 RX ADMIN — Medication 600 MILLIGRAM(S): at 05:13

## 2022-02-22 RX ADMIN — Medication 4: at 07:28

## 2022-02-22 RX ADMIN — OXYCODONE HYDROCHLORIDE 10 MILLIGRAM(S): 5 TABLET ORAL at 07:29

## 2022-02-22 RX ADMIN — HYDROMORPHONE HYDROCHLORIDE 2 MILLIGRAM(S): 2 INJECTION INTRAMUSCULAR; INTRAVENOUS; SUBCUTANEOUS at 07:07

## 2022-02-22 RX ADMIN — HYDROMORPHONE HYDROCHLORIDE 2 MILLIGRAM(S): 2 INJECTION INTRAMUSCULAR; INTRAVENOUS; SUBCUTANEOUS at 14:34

## 2022-02-22 NOTE — PROGRESS NOTE ADULT - SUBJECTIVE AND OBJECTIVE BOX
INTERVAL HPI/OVERNIGHT EVENTS: ***    PRESSORS: [ ] YES [ ] NO  WHICH:    Antimicrobial:    Cardiovascular:    Pulmonary:    Hematalogic:  enoxaparin Injectable 40 milliGRAM(s) SubCutaneous daily    Other:  acetaminophen     Tablet .. 650 milliGRAM(s) Oral every 6 hours PRN  chlorhexidine 2% Cloths 1 Application(s) Topical <User Schedule>  dextrose 5%. 1000 milliLiter(s) IV Continuous <Continuous>  gemfibrozil 600 milliGRAM(s) Oral every 12 hours  glucagon  Injectable 1 milliGRAM(s) IntraMuscular once  HYDROmorphone  Injectable 2 milliGRAM(s) IV Push every 4 hours PRN  insulin glargine Injectable (LANTUS) 10 Unit(s) SubCutaneous at bedtime  insulin lispro (ADMELOG) corrective regimen sliding scale   SubCutaneous three times a day before meals  oxyCODONE    IR 10 milliGRAM(s) Oral every 6 hours  pantoprazole  Injectable 40 milliGRAM(s) IV Push daily  potassium chloride    Tablet ER 40 milliEquivalent(s) Oral every 4 hours  potassium phosphate / sodium phosphate Powder (PHOS-NaK) 1 Packet(s) Oral every 12 hours    acetaminophen     Tablet .. 650 milliGRAM(s) Oral every 6 hours PRN  chlorhexidine 2% Cloths 1 Application(s) Topical <User Schedule>  dextrose 5%. 1000 milliLiter(s) IV Continuous <Continuous>  enoxaparin Injectable 40 milliGRAM(s) SubCutaneous daily  gemfibrozil 600 milliGRAM(s) Oral every 12 hours  glucagon  Injectable 1 milliGRAM(s) IntraMuscular once  HYDROmorphone  Injectable 2 milliGRAM(s) IV Push every 4 hours PRN  insulin glargine Injectable (LANTUS) 10 Unit(s) SubCutaneous at bedtime  insulin lispro (ADMELOG) corrective regimen sliding scale   SubCutaneous three times a day before meals  oxyCODONE    IR 10 milliGRAM(s) Oral every 6 hours  pantoprazole  Injectable 40 milliGRAM(s) IV Push daily  potassium chloride    Tablet ER 40 milliEquivalent(s) Oral every 4 hours  potassium phosphate / sodium phosphate Powder (PHOS-NaK) 1 Packet(s) Oral every 12 hours    Drug Dosing Weight  Height (cm): 170.2 (2022 04:18)  Weight (kg): 107 (2022 04:18)  BMI (kg/m2): 36.9 (2022 04:18)  BSA (m2): 2.17 (2022 04:18)    CENTRAL LINE: [ ] YES [ ] NO  LOCATION:   DATE INSERTED:  REMOVE: [ ] YES [ ] NO  EXPLAIN:    DUNN: [ ] YES [ ] NO    DATE INSERTED:  REMOVE:  [ ] YES [ ] NO  EXPLAIN:    A-LINE:  [ ] YES [ ] NO  LOCATION:   DATE INSERTED:  REMOVE:  [ ] YES [ ] NO  EXPLAIN:    PMH -reviewed admission note, no change since admission  PAST MEDICAL & SURGICAL HISTORY:  Diabetes        ICU Vital Signs Last 24 Hrs  T(C): 37.4 (2022 04:00), Max: 38.9 (2022 20:00)  T(F): 99.4 (2022 04:00), Max: 102 (2022 20:00)  HR: 97 (2022 06:00) (96 - 115)  BP: 117/79 (2022 06:00) (117/79 - 164/85)  BP(mean): 86 (2022 06:00) (80 - 108)  ABP: --  ABP(mean): --  RR: 25 (2022 06:00) (20 - 32)  SpO2: 98% (2022 06:00) (91% - 98%)             @ 07:01  -  02-22 @ 07:00  --------------------------------------------------------  IN: 1150 mL / OUT: 850 mL / NET: 300 mL            PHYSICAL EXAM:    GENERAL: NAD, well-groomed, well-developed  HEAD:  Atraumatic, Normocephalic  EYES: EOMI, PERRLA, conjunctiva and sclera clear  ENMT: No tonsillar erythema, exudates, or enlargement; Moist mucous membranes, Good dentition, No lesions  NECK: Supple, normal appearance, No JVD; Normal thyroid; Trachea midline  NERVOUS SYSTEM:  Alert & Oriented X3,  Motor Strength 5/5 B/L upper and lower extremities; DTRs 2+ intact and symmetric  CHEST/LUNG: No chest deformity; Normal percussion bilaterally; No rales, rhonchi, wheezing   HEART: Regular rate and rhythm; No murmurs, rubs, or gallops  ABDOMEN: Soft, Nontender, Nondistended; Bowel sounds present  EXTREMITIES:  2+ Peripheral Pulses, No clubbing, cyanosis, or edema  LYMPH: No lymphadenopathy noted  SKIN: No rashes or lesions;  Good capillary refill      LABS:  CBC Full  -  ( 2022 04:30 )  WBC Count : 9.52 K/uL  RBC Count : 4.79 M/uL  Hemoglobin : 13.1 g/dL  Hematocrit : 39.4 %  Platelet Count - Automated : 152 K/uL  Mean Cell Volume : 82.3 fl  Mean Cell Hemoglobin : 27.3 pg  Mean Cell Hemoglobin Concentration : 33.2 gm/dL  Auto Neutrophil # : 5.90 K/uL  Auto Lymphocyte # : 1.90 K/uL  Auto Monocyte # : 1.62 K/uL  Auto Eosinophil # : 0.00 K/uL  Auto Basophil # : 0.00 K/uL  Auto Neutrophil % : 56.0 %  Auto Lymphocyte % : 20.0 %  Auto Monocyte % : 17.0 %  Auto Eosinophil % : 0.0 %  Auto Basophil % : 0.0 %        133<L>  |  101  |  9   ----------------------------<  205<H>  3.3<L>   |  23  |  0.68    Ca    7.9<L>      2022 04:30  Phos  1.9       Mg     1.8         TPro  6.3  /  Alb  2.0<L>  /  TBili  4.5<H>  /  DBili  x   /  AST  26  /  ALT  63<H>  /  AlkPhos  60  -      Urinalysis Basic - ( 2022 16:21 )    Color: Tania / Appearance: Clear / S.020 / pH: x  Gluc: x / Ketone: Negative  / Bili: Small / Urobili: 1   Blood: x / Protein: 30 mg/dL / Nitrite: Negative   Leuk Esterase: Negative / RBC: 0-2 /HPF / WBC 0-2 /HPF   Sq Epi: x / Non Sq Epi: Occasional /HPF / Bacteria: Moderate /HPF      Culture Results:   No growth to date. ( @ 06:27)  Culture Results:   No growth to date. ( @ 06:27)  Culture Results:   No growth ( @ 12:33)      RADIOLOGY & ADDITIONAL STUDIES REVIEWED:  ***    [ ]GOALS OF CARE DISCUSSION WITH PATIENT/FAMILY/PROXY:    CRITICAL CARE TIME SPENT: 35 minutes INTERVAL HPI/OVERNIGHT EVENTS: No overnight events. Pt seen and examined at bedside, still w/ abdominal pain, however pain is improving. Tolerating clear liquid diet.  Denies N/V    PRESSORS: [ ] YES [x ] NO  WHICH:    Antimicrobial:    Cardiovascular:    Pulmonary:    Hematalogic:  enoxaparin Injectable 40 milliGRAM(s) SubCutaneous daily    Other:  acetaminophen     Tablet .. 650 milliGRAM(s) Oral every 6 hours PRN  chlorhexidine 2% Cloths 1 Application(s) Topical <User Schedule>  dextrose 5%. 1000 milliLiter(s) IV Continuous <Continuous>  gemfibrozil 600 milliGRAM(s) Oral every 12 hours  glucagon  Injectable 1 milliGRAM(s) IntraMuscular once  HYDROmorphone  Injectable 2 milliGRAM(s) IV Push every 4 hours PRN  insulin glargine Injectable (LANTUS) 10 Unit(s) SubCutaneous at bedtime  insulin lispro (ADMELOG) corrective regimen sliding scale   SubCutaneous three times a day before meals  oxyCODONE    IR 10 milliGRAM(s) Oral every 6 hours  pantoprazole  Injectable 40 milliGRAM(s) IV Push daily  potassium chloride    Tablet ER 40 milliEquivalent(s) Oral every 4 hours  potassium phosphate / sodium phosphate Powder (PHOS-NaK) 1 Packet(s) Oral every 12 hours    acetaminophen     Tablet .. 650 milliGRAM(s) Oral every 6 hours PRN  chlorhexidine 2% Cloths 1 Application(s) Topical <User Schedule>  dextrose 5%. 1000 milliLiter(s) IV Continuous <Continuous>  enoxaparin Injectable 40 milliGRAM(s) SubCutaneous daily  gemfibrozil 600 milliGRAM(s) Oral every 12 hours  glucagon  Injectable 1 milliGRAM(s) IntraMuscular once  HYDROmorphone  Injectable 2 milliGRAM(s) IV Push every 4 hours PRN  insulin glargine Injectable (LANTUS) 10 Unit(s) SubCutaneous at bedtime  insulin lispro (ADMELOG) corrective regimen sliding scale   SubCutaneous three times a day before meals  oxyCODONE    IR 10 milliGRAM(s) Oral every 6 hours  pantoprazole  Injectable 40 milliGRAM(s) IV Push daily  potassium chloride    Tablet ER 40 milliEquivalent(s) Oral every 4 hours  potassium phosphate / sodium phosphate Powder (PHOS-NaK) 1 Packet(s) Oral every 12 hours    Drug Dosing Weight  Height (cm): 170.2 (2022 04:18)  Weight (kg): 107 (2022 04:18)  BMI (kg/m2): 36.9 (2022 04:18)  BSA (m2): 2.17 (2022 04:18)    CENTRAL LINE: [ ] YES [x ] NO  LOCATION:   DATE INSERTED:  REMOVE: [ ] YES [ ] NO  EXPLAIN:    DUNN: [ ] YES [x ] NO    DATE INSERTED:  REMOVE:  [ ] YES [ ] NO  EXPLAIN:    A-LINE:  [ ] YES [ x] NO  LOCATION:   DATE INSERTED:  REMOVE:  [ ] YES [ ] NO  EXPLAIN:    PMH -reviewed admission note, no change since admission  PAST MEDICAL & SURGICAL HISTORY:  Diabetes        ICU Vital Signs Last 24 Hrs  T(C): 37.4 (2022 04:00), Max: 38.9 (2022 20:00)  T(F): 99.4 (2022 04:00), Max: 102 (2022 20:00)  HR: 97 (2022 06:00) (96 - 115)  BP: 117/79 (2022 06:00) (117/79 - 164/85)  BP(mean): 86 (2022 06:00) (80 - 108)  ABP: --  ABP(mean): --  RR: 25 (2022 06:00) (20 - 32)  SpO2: 98% (2022 06:00) (91% - 98%)             @ 07:01  -  - @ 07:00  --------------------------------------------------------  IN: 1150 mL / OUT: 850 mL / NET: 300 mL      PHYSICAL EXAM:    GENERAL: NAD, obese  HEAD:  Atraumatic, Normocephalic  EYES: EOMI, PERRLA, conjunctiva and sclera clear  ENMT: No tonsillar erythema, exudates, or enlargement; Moist mucous membranes  NECK: Supple, normal appearance, No JVD;  Trachea midline  NERVOUS SYSTEM:  Alert & Oriented X3,  Motor Strength 5/5 B/L upper and lower extremities; DTRs 2+ intact and symmetric  CHEST/LUNG: No chest deformity; Normal percussion bilaterally; No rales, rhonchi, wheezing   HEART: Regular rate and rhythm; No murmurs, rubs, or gallops  ABDOMEN: Increased abdominal girth,  soft,  mildly tender (diffuse pattern), mildly distended; Bowel sounds present  EXTREMITIES:  2+ Peripheral Pulses, No clubbing, cyanosis, or edema  SKIN: No rashes or lesions;  Good capillary refill        LABS:  CBC Full  -  ( 2022 04:30 )  WBC Count : 9.52 K/uL  RBC Count : 4.79 M/uL  Hemoglobin : 13.1 g/dL  Hematocrit : 39.4 %  Platelet Count - Automated : 152 K/uL  Mean Cell Volume : 82.3 fl  Mean Cell Hemoglobin : 27.3 pg  Mean Cell Hemoglobin Concentration : 33.2 gm/dL  Auto Neutrophil # : 5.90 K/uL  Auto Lymphocyte # : 1.90 K/uL  Auto Monocyte # : 1.62 K/uL  Auto Eosinophil # : 0.00 K/uL  Auto Basophil # : 0.00 K/uL  Auto Neutrophil % : 56.0 %  Auto Lymphocyte % : 20.0 %  Auto Monocyte % : 17.0 %  Auto Eosinophil % : 0.0 %  Auto Basophil % : 0.0 %        133<L>  |  101  |  9   ----------------------------<  205<H>  3.3<L>   |  23  |  0.68    Ca    7.9<L>      2022 04:30  Phos  1.9       Mg     1.8         TPro  6.3  /  Alb  2.0<L>  /  TBili  4.5<H>  /  DBili  x   /  AST  26  /  ALT  63<H>  /  AlkPhos  60        Urinalysis Basic - ( 2022 16:21 )    Color: Tania / Appearance: Clear / S.020 / pH: x  Gluc: x / Ketone: Negative  / Bili: Small / Urobili: 1   Blood: x / Protein: 30 mg/dL / Nitrite: Negative   Leuk Esterase: Negative / RBC: 0-2 /HPF / WBC 0-2 /HPF   Sq Epi: x / Non Sq Epi: Occasional /HPF / Bacteria: Moderate /HPF      Culture Results:   No growth to date. ( @ 06:27)  Culture Results:   No growth to date. ( @ 06:27)  Culture Results:   No growth ( @ 12:33)      RADIOLOGY & ADDITIONAL STUDIES REVIEWED:  ***    [ ]GOALS OF CARE DISCUSSION WITH PATIENT/FAMILY/PROXY:    CRITICAL CARE TIME SPENT: 35 minutes

## 2022-02-22 NOTE — PROGRESS NOTE ADULT - ASSESSMENT
ASSESSMENT AND PLAN: 30 YO with medical history of DM2 admitted to ICU for hypertriglyceridemia requiring Insulin drip     Acute Pancreatitis   Hypertriglyceridemia   Hyponatremia   Hyperglycemia   Transaminitis  Dm2  Obesity        Neuro  AAO 3 at baseline, No issues     Cardiovascular  No issues at present  normotensive    Pulmonary  Requiring O2 NC 2L     Infections  No issues       Nephro  Patient reports decreased urine output,, not been eating or drinking from last 3 days   Fluid balance -100      #Hyponatremia   na 128, corrected for blood glucose is 132  Also a component of poor oral intake and vomiting   resolved  monitor BMP     # Hypokalemia  replaced today  f/u BMP      # Hypophosphatemia  replaced  repeat phos levels     Gastrointestinal  #Acute Pancreatitis   Patient coming in with abdominal pain, elevated lipase, CT abdomen shows aurora pancreatic fluid and acute pancreatitis  Most likely the cause is hypertriglyceridemia and alcoholism   s/p 2L NS bolus, later switched to D5 1/2 NS at 100ml./hr  Given Lipase and TGC trending down, switched insulin drip to lantus 10 U at bedtime + SSI (mod scale)  readjusted regimen today to lantus 15U + admelog 2U premeal + SSI (mod scale)  monitor FS q 6 hrs  on clear liquid diet  tolerating well, switched to regular diet low fat  Pain management with Oxycodone PRN + Dilaudid PRN    #Transaminitis   Likely due to pancreatitis,   LFT stable  elevated T esequiel  ordered RUQ US  Monitor liver function          Heme  No issues     Endocrine  #Hypertriglyceridemia   Trending down  on Gemfibrozil 600 mg q 12 hrs      #Dm2  On Janumet at home  Ac1 9.5  Patient on insulin drip and D5 1/2 NS,   readjusted regimen today to lantus 15U + admelog 2U premeal + SSI (mod scale)  Monitor FS Q1 given risk of hypoglycemia  Endo (Dr. Reyes) consulted         Prophylaxis   Lovenox 40mg SQ   Protonix 40mg for GI     Disposition:  transfer to medicine floor

## 2022-02-23 DIAGNOSIS — E78.1 PURE HYPERGLYCERIDEMIA: ICD-10-CM

## 2022-02-23 DIAGNOSIS — E11.65 TYPE 2 DIABETES MELLITUS WITH HYPERGLYCEMIA: ICD-10-CM

## 2022-02-23 LAB
ALBUMIN SERPL ELPH-MCNC: 2.2 G/DL — LOW (ref 3.5–5)
ALP SERPL-CCNC: 74 U/L — SIGNIFICANT CHANGE UP (ref 40–120)
ALT FLD-CCNC: 62 U/L DA — HIGH (ref 10–60)
ANION GAP SERPL CALC-SCNC: 8 MMOL/L — SIGNIFICANT CHANGE UP (ref 5–17)
AST SERPL-CCNC: 34 U/L — SIGNIFICANT CHANGE UP (ref 10–40)
BASOPHILS # BLD AUTO: 0.04 K/UL — SIGNIFICANT CHANGE UP (ref 0–0.2)
BASOPHILS NFR BLD AUTO: 0.4 % — SIGNIFICANT CHANGE UP (ref 0–2)
BILIRUB SERPL-MCNC: 3.6 MG/DL — HIGH (ref 0.2–1.2)
BUN SERPL-MCNC: 9 MG/DL — SIGNIFICANT CHANGE UP (ref 7–18)
CALCIUM SERPL-MCNC: 8.7 MG/DL — SIGNIFICANT CHANGE UP (ref 8.4–10.5)
CHLORIDE SERPL-SCNC: 100 MMOL/L — SIGNIFICANT CHANGE UP (ref 96–108)
CO2 SERPL-SCNC: 24 MMOL/L — SIGNIFICANT CHANGE UP (ref 22–31)
CREAT SERPL-MCNC: 0.54 MG/DL — SIGNIFICANT CHANGE UP (ref 0.5–1.3)
EOSINOPHIL # BLD AUTO: 0.24 K/UL — SIGNIFICANT CHANGE UP (ref 0–0.5)
EOSINOPHIL NFR BLD AUTO: 2.2 % — SIGNIFICANT CHANGE UP (ref 0–6)
GLUCOSE BLDC GLUCOMTR-MCNC: 172 MG/DL — HIGH (ref 70–99)
GLUCOSE BLDC GLUCOMTR-MCNC: 182 MG/DL — HIGH (ref 70–99)
GLUCOSE BLDC GLUCOMTR-MCNC: 192 MG/DL — HIGH (ref 70–99)
GLUCOSE BLDC GLUCOMTR-MCNC: 194 MG/DL — HIGH (ref 70–99)
GLUCOSE SERPL-MCNC: 197 MG/DL — HIGH (ref 70–99)
HCT VFR BLD CALC: 36.6 % — LOW (ref 39–50)
HGB BLD-MCNC: 12.1 G/DL — LOW (ref 13–17)
IMM GRANULOCYTES NFR BLD AUTO: 0.7 % — SIGNIFICANT CHANGE UP (ref 0–1.5)
LIDOCAIN IGE QN: 474 U/L — HIGH (ref 73–393)
LYMPHOCYTES # BLD AUTO: 1.8 K/UL — SIGNIFICANT CHANGE UP (ref 1–3.3)
LYMPHOCYTES # BLD AUTO: 16.8 % — SIGNIFICANT CHANGE UP (ref 13–44)
MAGNESIUM SERPL-MCNC: 2.3 MG/DL — SIGNIFICANT CHANGE UP (ref 1.6–2.6)
MCHC RBC-ENTMCNC: 27.3 PG — SIGNIFICANT CHANGE UP (ref 27–34)
MCHC RBC-ENTMCNC: 33.1 GM/DL — SIGNIFICANT CHANGE UP (ref 32–36)
MCV RBC AUTO: 82.4 FL — SIGNIFICANT CHANGE UP (ref 80–100)
MONOCYTES # BLD AUTO: 1.26 K/UL — HIGH (ref 0–0.9)
MONOCYTES NFR BLD AUTO: 11.8 % — SIGNIFICANT CHANGE UP (ref 2–14)
NEUTROPHILS # BLD AUTO: 7.28 K/UL — SIGNIFICANT CHANGE UP (ref 1.8–7.4)
NEUTROPHILS NFR BLD AUTO: 68.1 % — SIGNIFICANT CHANGE UP (ref 43–77)
NRBC # BLD: 0 /100 WBCS — SIGNIFICANT CHANGE UP (ref 0–0)
PHOSPHATE SERPL-MCNC: 2 MG/DL — LOW (ref 2.5–4.5)
PLATELET # BLD AUTO: 185 K/UL — SIGNIFICANT CHANGE UP (ref 150–400)
POTASSIUM SERPL-MCNC: 3.6 MMOL/L — SIGNIFICANT CHANGE UP (ref 3.5–5.3)
POTASSIUM SERPL-SCNC: 3.6 MMOL/L — SIGNIFICANT CHANGE UP (ref 3.5–5.3)
PROT SERPL-MCNC: 6.7 G/DL — SIGNIFICANT CHANGE UP (ref 6–8.3)
RBC # BLD: 4.44 M/UL — SIGNIFICANT CHANGE UP (ref 4.2–5.8)
RBC # FLD: 14.1 % — SIGNIFICANT CHANGE UP (ref 10.3–14.5)
SODIUM SERPL-SCNC: 132 MMOL/L — LOW (ref 135–145)
WBC # BLD: 10.7 K/UL — HIGH (ref 3.8–10.5)
WBC # FLD AUTO: 10.7 K/UL — HIGH (ref 3.8–10.5)

## 2022-02-23 PROCEDURE — 76705 ECHO EXAM OF ABDOMEN: CPT | Mod: 26,RT

## 2022-02-23 RX ORDER — INSULIN GLARGINE 100 [IU]/ML
20 INJECTION, SOLUTION SUBCUTANEOUS AT BEDTIME
Refills: 0 | Status: DISCONTINUED | OUTPATIENT
Start: 2022-02-23 | End: 2022-02-23

## 2022-02-23 RX ORDER — KETOROLAC TROMETHAMINE 30 MG/ML
15 SYRINGE (ML) INJECTION EVERY 8 HOURS
Refills: 0 | Status: DISCONTINUED | OUTPATIENT
Start: 2022-02-23 | End: 2022-02-26

## 2022-02-23 RX ORDER — INSULIN LISPRO 100/ML
2 VIAL (ML) SUBCUTANEOUS
Refills: 0 | Status: DISCONTINUED | OUTPATIENT
Start: 2022-02-23 | End: 2022-03-02

## 2022-02-23 RX ORDER — POTASSIUM PHOSPHATE, MONOBASIC POTASSIUM PHOSPHATE, DIBASIC 236; 224 MG/ML; MG/ML
30 INJECTION, SOLUTION INTRAVENOUS ONCE
Refills: 0 | Status: COMPLETED | OUTPATIENT
Start: 2022-02-23 | End: 2022-02-23

## 2022-02-23 RX ORDER — INSULIN GLARGINE 100 [IU]/ML
15 INJECTION, SOLUTION SUBCUTANEOUS AT BEDTIME
Refills: 0 | Status: DISCONTINUED | OUTPATIENT
Start: 2022-02-23 | End: 2022-03-02

## 2022-02-23 RX ORDER — HYDROMORPHONE HYDROCHLORIDE 2 MG/ML
2 INJECTION INTRAMUSCULAR; INTRAVENOUS; SUBCUTANEOUS EVERY 6 HOURS
Refills: 0 | Status: DISCONTINUED | OUTPATIENT
Start: 2022-02-23 | End: 2022-02-23

## 2022-02-23 RX ORDER — TRAMADOL HYDROCHLORIDE 50 MG/1
25 TABLET ORAL EVERY 6 HOURS
Refills: 0 | Status: DISCONTINUED | OUTPATIENT
Start: 2022-02-23 | End: 2022-02-26

## 2022-02-23 RX ADMIN — Medication 2 UNIT(S): at 07:15

## 2022-02-23 RX ADMIN — HYDROMORPHONE HYDROCHLORIDE 2 MILLIGRAM(S): 2 INJECTION INTRAMUSCULAR; INTRAVENOUS; SUBCUTANEOUS at 05:22

## 2022-02-23 RX ADMIN — Medication 650 MILLIGRAM(S): at 06:54

## 2022-02-23 RX ADMIN — Medication 15 MILLIGRAM(S): at 13:29

## 2022-02-23 RX ADMIN — PANTOPRAZOLE SODIUM 40 MILLIGRAM(S): 20 TABLET, DELAYED RELEASE ORAL at 11:01

## 2022-02-23 RX ADMIN — Medication 2: at 10:48

## 2022-02-23 RX ADMIN — CHLORHEXIDINE GLUCONATE 1 APPLICATION(S): 213 SOLUTION TOPICAL at 05:21

## 2022-02-23 RX ADMIN — ENOXAPARIN SODIUM 40 MILLIGRAM(S): 100 INJECTION SUBCUTANEOUS at 11:01

## 2022-02-23 RX ADMIN — Medication 600 MILLIGRAM(S): at 18:26

## 2022-02-23 RX ADMIN — Medication 1 PACKET(S): at 05:23

## 2022-02-23 RX ADMIN — Medication 2 UNIT(S): at 15:35

## 2022-02-23 RX ADMIN — HYDROMORPHONE HYDROCHLORIDE 2 MILLIGRAM(S): 2 INJECTION INTRAMUSCULAR; INTRAVENOUS; SUBCUTANEOUS at 06:48

## 2022-02-23 RX ADMIN — Medication 650 MILLIGRAM(S): at 05:21

## 2022-02-23 RX ADMIN — Medication 1 PACKET(S): at 18:26

## 2022-02-23 RX ADMIN — Medication 2 UNIT(S): at 10:49

## 2022-02-23 RX ADMIN — INSULIN GLARGINE 15 UNIT(S): 100 INJECTION, SOLUTION SUBCUTANEOUS at 23:00

## 2022-02-23 RX ADMIN — HYDROMORPHONE HYDROCHLORIDE 2 MILLIGRAM(S): 2 INJECTION INTRAMUSCULAR; INTRAVENOUS; SUBCUTANEOUS at 07:11

## 2022-02-23 RX ADMIN — Medication 15 MILLIGRAM(S): at 21:45

## 2022-02-23 RX ADMIN — HYDROMORPHONE HYDROCHLORIDE 2 MILLIGRAM(S): 2 INJECTION INTRAMUSCULAR; INTRAVENOUS; SUBCUTANEOUS at 06:54

## 2022-02-23 RX ADMIN — Medication 2: at 15:34

## 2022-02-23 RX ADMIN — HYDROMORPHONE HYDROCHLORIDE 2 MILLIGRAM(S): 2 INJECTION INTRAMUSCULAR; INTRAVENOUS; SUBCUTANEOUS at 03:12

## 2022-02-23 RX ADMIN — POTASSIUM PHOSPHATE, MONOBASIC POTASSIUM PHOSPHATE, DIBASIC 83.33 MILLIMOLE(S): 236; 224 INJECTION, SOLUTION INTRAVENOUS at 06:48

## 2022-02-23 RX ADMIN — Medication 2: at 07:14

## 2022-02-23 RX ADMIN — Medication 15 MILLIGRAM(S): at 13:44

## 2022-02-23 RX ADMIN — Medication 600 MILLIGRAM(S): at 05:21

## 2022-02-23 NOTE — DIETITIAN INITIAL EVALUATION ADULT. - REASON INDICATOR FOR ASSESSMENT
LOS (ICU), D/G 2/22, still in ICU. Clears (DM)/ NPO day #4 ( advanced to solids 2/22, but back to clears 2/22). A1c 9.5 LOS (ICU), D/G 2/22, still in ICU. Clears (DM)/ NPO day #4 ( advanced to solids 2/22, but back to clears 2/22 "increased pain w/ solids). A1c 9.5

## 2022-02-23 NOTE — DIETITIAN INITIAL EVALUATION ADULT. - PERTINENT MEDS FT
MEDICATIONS  (STANDING):  chlorhexidine 2% Cloths 1 Application(s) Topical <User Schedule>  dextrose 5%. 1000 milliLiter(s) (100 mL/Hr) IV Continuous <Continuous>  enoxaparin Injectable 40 milliGRAM(s) SubCutaneous daily  gemfibrozil 600 milliGRAM(s) Oral every 12 hours  glucagon  Injectable 1 milliGRAM(s) IntraMuscular once  insulin glargine Injectable (LANTUS) 15 Unit(s) SubCutaneous at bedtime  insulin lispro (ADMELOG) corrective regimen sliding scale   SubCutaneous three times a day before meals  insulin lispro Injectable (ADMELOG) 2 Unit(s) SubCutaneous three times a day before meals  pantoprazole  Injectable 40 milliGRAM(s) IV Push daily  potassium phosphate / sodium phosphate Powder (PHOS-NaK) 1 Packet(s) Oral every 12 hours    MEDICATIONS  (PRN):  ketorolac   Injectable 15 milliGRAM(s) IV Push every 8 hours PRN Moderate Pain (4 - 6)  traMADol 25 milliGRAM(s) Oral every 6 hours PRN Severe Pain (7 - 10)

## 2022-02-23 NOTE — DIETITIAN INITIAL EVALUATION ADULT. - OTHER INFO
Pt noted w/ 3 day hx not eating/ drinking PTA. Noted w/ abdominal pain x3 days PTA, vomiting and persistent nausea. Pt with elevated TG/ elevated blood glucose (acute pancreatitis). Hx DM on oral agent. Hx ETOH. Pt seen by Endocrinology this AM. Pt seen, asleep, appears (obese)/ w/ ?abdominal distension), Discussed with RN, reports pt took 100% clear liquid tray.

## 2022-02-23 NOTE — PROGRESS NOTE ADULT - ASSESSMENT
ASSESSMENT AND PLAN: 32 YO with medical history of DM2 admitted to ICU for hypertriglyceridemia requiring Insulin drip     Acute Pancreatitis   Hypertriglyceridemia   Hyponatremia   Hyperglycemia   Transaminitis  Dm2  Obesity        Neuro  AAO 3 at baseline, No issues     Cardiovascular  No issues at present  normotensive    Pulmonary  No issues    Infections  No issues       Nephro  Patient reports decreased urine output,, not been eating or drinking from last 3 days   Fluid balance +33    #Hyponatremia   na 128, corrected for blood glucose is 132  Also a component of poor oral intake and vomiting   resolved  monitor BMP     # Hypokalemia  resolved  f/u BMP      # Hypophosphatemia  replaced  repeat phos levels tomorrow      Gastrointestinal  #Acute Pancreatitis   Patient coming in with abdominal pain, elevated lipase, CT abdomen shows aurora pancreatic fluid and acute pancreatitis  Most likely the cause is hypertriglyceridemia and alcoholism   s/p 2L NS bolus, later switched to D5 1/2 NS at 100ml./hr  Given Lipase and TGC trending down, switched insulin drip to lantus 10 U at bedtime + SSI (mod scale)  readjusted regimen today to lantus 15U + admelog 2U premeal + SSI (mod scale)  monitor FS q 6 hrs  on clear liquid diet now, as he did not tolerate regular diet due to pain  Pain management w/ toradol and tramadol     #Transaminitis   Likely due to pancreatitis,   LFT stable  elevated T esequiel  ordered RUQ US (pending)  Monitor liver function          Heme  No issues     Endocrine  #Hypertriglyceridemia   Trending down  on Gemfibrozil 600 mg q 12 hrs      #Dm2  On Janumet at home  Ac1 9.5  Patient on insulin drip and D5 1/2 NS,   readjusted regimen today to lantus 15U + admelog 2U premeal + SSI (mod scale)  Monitor FS AC HS  Endo (Dr. Reyes) consulted         Prophylaxis   Lovenox 40mg SQ   Protonix 40mg for GI     Disposition:  transfer to medicine floor

## 2022-02-23 NOTE — PROGRESS NOTE ADULT - SUBJECTIVE AND OBJECTIVE BOX
INTERVAL HPI/OVERNIGHT EVENTS: ***    PRESSORS: [ ] YES [ ] NO  WHICH:    Antimicrobial:    Cardiovascular:    Pulmonary:    Hematalogic:  enoxaparin Injectable 40 milliGRAM(s) SubCutaneous daily    Other:  acetaminophen     Tablet .. 650 milliGRAM(s) Oral every 6 hours  chlorhexidine 2% Cloths 1 Application(s) Topical <User Schedule>  dextrose 5%. 1000 milliLiter(s) IV Continuous <Continuous>  gemfibrozil 600 milliGRAM(s) Oral every 12 hours  glucagon  Injectable 1 milliGRAM(s) IntraMuscular once  HYDROmorphone   Tablet 2 milliGRAM(s) Oral every 6 hours PRN  HYDROmorphone  Injectable 2 milliGRAM(s) IV Push every 4 hours PRN  insulin glargine Injectable (LANTUS) 15 Unit(s) SubCutaneous at bedtime  insulin lispro (ADMELOG) corrective regimen sliding scale   SubCutaneous three times a day before meals  insulin lispro Injectable (ADMELOG) 2 Unit(s) SubCutaneous three times a day before meals  pantoprazole  Injectable 40 milliGRAM(s) IV Push daily  potassium phosphate / sodium phosphate Powder (PHOS-NaK) 1 Packet(s) Oral every 12 hours    acetaminophen     Tablet .. 650 milliGRAM(s) Oral every 6 hours  chlorhexidine 2% Cloths 1 Application(s) Topical <User Schedule>  dextrose 5%. 1000 milliLiter(s) IV Continuous <Continuous>  enoxaparin Injectable 40 milliGRAM(s) SubCutaneous daily  gemfibrozil 600 milliGRAM(s) Oral every 12 hours  glucagon  Injectable 1 milliGRAM(s) IntraMuscular once  HYDROmorphone   Tablet 2 milliGRAM(s) Oral every 6 hours PRN  HYDROmorphone  Injectable 2 milliGRAM(s) IV Push every 4 hours PRN  insulin glargine Injectable (LANTUS) 15 Unit(s) SubCutaneous at bedtime  insulin lispro (ADMELOG) corrective regimen sliding scale   SubCutaneous three times a day before meals  insulin lispro Injectable (ADMELOG) 2 Unit(s) SubCutaneous three times a day before meals  pantoprazole  Injectable 40 milliGRAM(s) IV Push daily  potassium phosphate / sodium phosphate Powder (PHOS-NaK) 1 Packet(s) Oral every 12 hours    Drug Dosing Weight  Height (cm): 170.2 (2022 04:18)  Weight (kg): 107 (2022 04:18)  BMI (kg/m2): 36.9 (2022 04:18)  BSA (m2): 2.17 (2022 04:18)    CENTRAL LINE: [ ] YES [ ] NO  LOCATION:   DATE INSERTED:  REMOVE: [ ] YES [ ] NO  EXPLAIN:    DUNN: [ ] YES [ ] NO    DATE INSERTED:  REMOVE:  [ ] YES [ ] NO  EXPLAIN:    A-LINE:  [ ] YES [ ] NO  LOCATION:   DATE INSERTED:  REMOVE:  [ ] YES [ ] NO  EXPLAIN:    PMH -reviewed admission note, no change since admission  PAST MEDICAL & SURGICAL HISTORY:  Diabetes        ICU Vital Signs Last 24 Hrs  T(C): 37.1 (2022 04:00), Max: 38.2 (2022 23:00)  T(F): 98.7 (2022 04:00), Max: 100.8 (2022 23:00)  HR: 96 (2022 06:00) (89 - 110)  BP: 136/84 (2022 06:00) (118/52 - 148/88)  BP(mean): 95 (2022 06:00) (67 - 103)  ABP: --  ABP(mean): --  RR: 18 (2022 06:00) (13 - 27)  SpO2: 96% (2022 06:00) (92% - 98%)            - @ 07:01  -  02- @ 07:00  --------------------------------------------------------  IN: 2783.3 mL / OUT: 2750 mL / NET: 33.3 mL            PHYSICAL EXAM:    GENERAL: NAD, well-groomed, well-developed  HEAD:  Atraumatic, Normocephalic  EYES: EOMI, PERRLA, conjunctiva and sclera clear  ENMT: No tonsillar erythema, exudates, or enlargement; Moist mucous membranes, Good dentition, No lesions  NECK: Supple, normal appearance, No JVD; Normal thyroid; Trachea midline  NERVOUS SYSTEM:  Alert & Oriented X3,  Motor Strength 5/5 B/L upper and lower extremities; DTRs 2+ intact and symmetric  CHEST/LUNG: No chest deformity; Normal percussion bilaterally; No rales, rhonchi, wheezing   HEART: Regular rate and rhythm; No murmurs, rubs, or gallops  ABDOMEN: Soft, Nontender, Nondistended; Bowel sounds present  EXTREMITIES:  2+ Peripheral Pulses, No clubbing, cyanosis, or edema  LYMPH: No lymphadenopathy noted  SKIN: No rashes or lesions;  Good capillary refill      LABS:  CBC Full  -  ( 2022 04:47 )  WBC Count : 10.70 K/uL  RBC Count : 4.44 M/uL  Hemoglobin : 12.1 g/dL  Hematocrit : 36.6 %  Platelet Count - Automated : 185 K/uL  Mean Cell Volume : 82.4 fl  Mean Cell Hemoglobin : 27.3 pg  Mean Cell Hemoglobin Concentration : 33.1 gm/dL  Auto Neutrophil # : 7.28 K/uL  Auto Lymphocyte # : 1.80 K/uL  Auto Monocyte # : 1.26 K/uL  Auto Eosinophil # : 0.24 K/uL  Auto Basophil # : 0.04 K/uL  Auto Neutrophil % : 68.1 %  Auto Lymphocyte % : 16.8 %  Auto Monocyte % : 11.8 %  Auto Eosinophil % : 2.2 %  Auto Basophil % : 0.4 %        132<L>  |  100  |  9   ----------------------------<  197<H>  3.6   |  24  |  0.54    Ca    8.7      2022 04:47  Phos  2.0       Mg     2.3         TPro  6.7  /  Alb  2.2<L>  /  TBili  3.6<H>  /  DBili  x   /  AST  34  /  ALT  62<H>  /  AlkPhos  74        Urinalysis Basic - ( 2022 16:21 )    Color: Tania / Appearance: Clear / S.020 / pH: x  Gluc: x / Ketone: Negative  / Bili: Small / Urobili: 1   Blood: x / Protein: 30 mg/dL / Nitrite: Negative   Leuk Esterase: Negative / RBC: 0-2 /HPF / WBC 0-2 /HPF   Sq Epi: x / Non Sq Epi: Occasional /HPF / Bacteria: Moderate /HPF      Culture Results:   No growth to date. ( @ 06:27)  Culture Results:   No growth to date. ( @ 06:27)  Culture Results:   No growth ( @ 12:33)      RADIOLOGY & ADDITIONAL STUDIES REVIEWED:  ***    [ ]GOALS OF CARE DISCUSSION WITH PATIENT/FAMILY/PROXY:    CRITICAL CARE TIME SPENT: 35 minutes INTERVAL HPI/OVERNIGHT EVENTS: Pt c/o abdominal pain overnight, diet was changed to clear liquid.     PRESSORS: [ ] YES [x ] NO  WHICH:    Antimicrobial:    Cardiovascular:    Pulmonary:    Hematalogic:  enoxaparin Injectable 40 milliGRAM(s) SubCutaneous daily    Other:  acetaminophen     Tablet .. 650 milliGRAM(s) Oral every 6 hours  chlorhexidine 2% Cloths 1 Application(s) Topical <User Schedule>  dextrose 5%. 1000 milliLiter(s) IV Continuous <Continuous>  gemfibrozil 600 milliGRAM(s) Oral every 12 hours  glucagon  Injectable 1 milliGRAM(s) IntraMuscular once  HYDROmorphone   Tablet 2 milliGRAM(s) Oral every 6 hours PRN  HYDROmorphone  Injectable 2 milliGRAM(s) IV Push every 4 hours PRN  insulin glargine Injectable (LANTUS) 15 Unit(s) SubCutaneous at bedtime  insulin lispro (ADMELOG) corrective regimen sliding scale   SubCutaneous three times a day before meals  insulin lispro Injectable (ADMELOG) 2 Unit(s) SubCutaneous three times a day before meals  pantoprazole  Injectable 40 milliGRAM(s) IV Push daily  potassium phosphate / sodium phosphate Powder (PHOS-NaK) 1 Packet(s) Oral every 12 hours    acetaminophen     Tablet .. 650 milliGRAM(s) Oral every 6 hours  chlorhexidine 2% Cloths 1 Application(s) Topical <User Schedule>  dextrose 5%. 1000 milliLiter(s) IV Continuous <Continuous>  enoxaparin Injectable 40 milliGRAM(s) SubCutaneous daily  gemfibrozil 600 milliGRAM(s) Oral every 12 hours  glucagon  Injectable 1 milliGRAM(s) IntraMuscular once  HYDROmorphone   Tablet 2 milliGRAM(s) Oral every 6 hours PRN  HYDROmorphone  Injectable 2 milliGRAM(s) IV Push every 4 hours PRN  insulin glargine Injectable (LANTUS) 15 Unit(s) SubCutaneous at bedtime  insulin lispro (ADMELOG) corrective regimen sliding scale   SubCutaneous three times a day before meals  insulin lispro Injectable (ADMELOG) 2 Unit(s) SubCutaneous three times a day before meals  pantoprazole  Injectable 40 milliGRAM(s) IV Push daily  potassium phosphate / sodium phosphate Powder (PHOS-NaK) 1 Packet(s) Oral every 12 hours    Drug Dosing Weight  Height (cm): 170.2 (2022 04:18)  Weight (kg): 107 (2022 04:18)  BMI (kg/m2): 36.9 (2022 04:18)  BSA (m2): 2.17 (2022 04:18)    CENTRAL LINE: [ ] YES [x ] NO  LOCATION:   DATE INSERTED:  REMOVE: [ ] YES [ ] NO  EXPLAIN:    DUNN: [ ] YES [x ] NO    DATE INSERTED:  REMOVE:  [ ] YES [ ] NO  EXPLAIN:    A-LINE:  [ ] YES [x ] NO  LOCATION:   DATE INSERTED:  REMOVE:  [ ] YES [ ] NO  EXPLAIN:    PMH -reviewed admission note, no change since admission  PAST MEDICAL & SURGICAL HISTORY:  Diabetes        ICU Vital Signs Last 24 Hrs  T(C): 37.1 (2022 04:00), Max: 38.2 (2022 23:00)  T(F): 98.7 (2022 04:00), Max: 100.8 (2022 23:00)  HR: 96 (2022 06:00) (89 - 110)  BP: 136/84 (2022 06:00) (118/52 - 148/88)  BP(mean): 95 (2022 06:00) (67 - 103)  ABP: --  ABP(mean): --  RR: 18 (2022 06:00) (13 - 27)  SpO2: 96% (2022 06:00) (92% - 98%)             @ 07:01  -  - @ 07:00  --------------------------------------------------------  IN: 2783.3 mL / OUT: 2750 mL / NET: 33.3 mL            PHYSICAL EXAM:  GENERAL: NAD, obese  HEAD:  Atraumatic, Normocephalic  EYES: EOMI, PERRLA, conjunctiva and sclera clear  ENMT: No tonsillar erythema, exudates, or enlargement; Moist mucous membranes  NECK: Supple, normal appearance, No JVD;  Trachea midline  NERVOUS SYSTEM:  Alert & Oriented X3,  Motor Strength 5/5 B/L upper and lower extremities; DTRs 2+ intact and symmetric  CHEST/LUNG: No chest deformity; Normal percussion bilaterally; No rales, rhonchi, wheezing   HEART: Regular rate and rhythm; No murmurs, rubs, or gallops  ABDOMEN: Increased abdominal girth,  soft,  mildly tender (diffuse pattern), mildly distended; Bowel sounds present  EXTREMITIES:  2+ Peripheral Pulses, No clubbing, cyanosis, or edema  SKIN: No rashes or lesions;  Good capillary refill          LABS:  CBC Full  -  ( 2022 04:47 )  WBC Count : 10.70 K/uL  RBC Count : 4.44 M/uL  Hemoglobin : 12.1 g/dL  Hematocrit : 36.6 %  Platelet Count - Automated : 185 K/uL  Mean Cell Volume : 82.4 fl  Mean Cell Hemoglobin : 27.3 pg  Mean Cell Hemoglobin Concentration : 33.1 gm/dL  Auto Neutrophil # : 7.28 K/uL  Auto Lymphocyte # : 1.80 K/uL  Auto Monocyte # : 1.26 K/uL  Auto Eosinophil # : 0.24 K/uL  Auto Basophil # : 0.04 K/uL  Auto Neutrophil % : 68.1 %  Auto Lymphocyte % : 16.8 %  Auto Monocyte % : 11.8 %  Auto Eosinophil % : 2.2 %  Auto Basophil % : 0.4 %        132<L>  |  100  |  9   ----------------------------<  197<H>  3.6   |  24  |  0.54    Ca    8.7      2022 04:47  Phos  2.0       Mg     2.3         TPro  6.7  /  Alb  2.2<L>  /  TBili  3.6<H>  /  DBili  x   /  AST  34  /  ALT  62<H>  /  AlkPhos  74        Urinalysis Basic - ( 2022 16:21 )    Color: Tania / Appearance: Clear / S.020 / pH: x  Gluc: x / Ketone: Negative  / Bili: Small / Urobili: 1   Blood: x / Protein: 30 mg/dL / Nitrite: Negative   Leuk Esterase: Negative / RBC: 0-2 /HPF / WBC 0-2 /HPF   Sq Epi: x / Non Sq Epi: Occasional /HPF / Bacteria: Moderate /HPF      Culture Results:   No growth to date. ( @ 06:27)  Culture Results:   No growth to date. ( @ 06:27)  Culture Results:   No growth ( @ 12:33)      RADIOLOGY & ADDITIONAL STUDIES REVIEWED:  ***    [ ]GOALS OF CARE DISCUSSION WITH PATIENT/FAMILY/PROXY:    CRITICAL CARE TIME SPENT: 35 minutes

## 2022-02-23 NOTE — CONSULT NOTE ADULT - PROBLEM SELECTOR RECOMMENDATION 9
cont current insulin tx  d/c janumet -out pt med due to pancreatitis  consider metformin and low dose sulfonylurea upon d/c  fsg ac and hs  a1c- 9.5

## 2022-02-23 NOTE — CONSULT NOTE ADULT - SUBJECTIVE AND OBJECTIVE BOX
Patient is a 31y old  Male who presents with a chief complaint of Abdominal pain (2022 07:06)      HPI:  31yoM with h/o DMon oral medications (unsure about name and dose), presents generalized though possibly slightly epigastric-predominant abdominal pain x 3 days. Worsened with laying and standing, non-food related per se. Patient reports vomiting episode and persistent nausea.  Also notes constipation (last BM yesterday) and low urine output.   Patient also reports unable to pass flatus and had last BM on 1 day ago.   Patient reports he had similar complaints 2 years ago but it was not this bad and did not require hospitalization.    ED Course:  TG 1984, Lipase 4550 , Na 128  ICU Vital Signs Last 24 Hrs  T(C): 36.7 (2022 07:00), Max: 37 (2022 04:18)  T(F): 98 (2022 07:00), Max: 98.6 (2022 04:18)  HR: 88 (2022 07:00) (88 - 94)  BP: 137/82 (2022 07:00) (137/82 - 151/98)  RR: 18 (2022 07:00) (18 - 18)  SpO2: 99% (2022 07:00) (98% - 99%)     (2022 08:38)      PAST MEDICAL & SURGICAL HISTORY:  Diabetes           MEDICATIONS  (STANDING):  chlorhexidine 2% Cloths 1 Application(s) Topical <User Schedule>  dextrose 5%. 1000 milliLiter(s) (100 mL/Hr) IV Continuous <Continuous>  enoxaparin Injectable 40 milliGRAM(s) SubCutaneous daily  gemfibrozil 600 milliGRAM(s) Oral every 12 hours  glucagon  Injectable 1 milliGRAM(s) IntraMuscular once  insulin glargine Injectable (LANTUS) 15 Unit(s) SubCutaneous at bedtime  insulin lispro (ADMELOG) corrective regimen sliding scale   SubCutaneous three times a day before meals  insulin lispro Injectable (ADMELOG) 2 Unit(s) SubCutaneous three times a day before meals  pantoprazole  Injectable 40 milliGRAM(s) IV Push daily  potassium phosphate / sodium phosphate Powder (PHOS-NaK) 1 Packet(s) Oral every 12 hours    MEDICATIONS  (PRN):  ketorolac   Injectable 15 milliGRAM(s) IV Push every 8 hours PRN Moderate Pain (4 - 6)  traMADol 25 milliGRAM(s) Oral every 6 hours PRN Severe Pain (7 - 10)      FAMILY HISTORY:      SOCIAL HISTORY:      REVIEW OF SYSTEMS:  CONSTITUTIONAL: No fever, weight loss, or fatigue  EYES: No eye pain, visual disturbances, or discharge  ENT:  No difficulty hearing, tinnitus, vertigo; No sinus or throat pain  NECK: No pain or stiffness  RESPIRATORY: No cough, wheezing, chills or hemoptysis; No Shortness of Breath  CARDIOVASCULAR: No chest pain, palpitations, passing out, dizziness, or leg swelling  GASTROINTESTINAL: No abdominal or epigastric pain. No nausea, vomiting, or hematemesis; No diarrhea or constipation. No melena or hematochezia.  GENITOURINARY: No dysuria, frequency, hematuria, or incontinence  NEUROLOGICAL: No headaches, memory loss, loss of strength, numbness, or tremors  SKIN: No itching, burning, rashes, or lesions   LYMPH Nodes: No enlarged glands  ENDOCRINE: No heat or cold intolerance; No hair loss  MUSCULOSKELETAL: No joint pain or swelling; No muscle, back, or extremity pain  PSYCHIATRIC: No depression, anxiety, mood swings, or difficulty sleeping  HEME/LYMPH: No easy bruising, or bleeding gums  ALLERGY AND IMMUNOLOGIC: No hives or eczema	        Vital Signs Last 24 Hrs  T(C): 37.1 (2022 04:00), Max: 38.2 (2022 23:00)  T(F): 98.7 (2022 04:00), Max: 100.8 (2022 23:00)  HR: 87 (2022 10:00) (84 - 108)  BP: 135/74 (2022 10:00) (118/52 - 148/88)  BP(mean): 87 (2022 10:00) (67 - 103)  RR: 16 (2022 09:00) (13 - 27)  SpO2: 97% (2022 10:00) (92% - 98%)      Constitutional:    HEENT: nad    Neck:  No JVD, bruits or thyromegaly    Respiratory:  Clear without rales or rhonchi    Cardiovascular:  RR without murmur, rub or gallop.    Gastrointestinal: Soft without hepatosplenomegaly.    Extremities: without cyanosis, clubbing or edema.    Neurological:  Oriented   x  3    . No gross sensory or motor defects.        LABS:                        12.1   10.70 )-----------( 185      ( 2022 04:47 )             36.6         132<L>  |  100  |  9   ----------------------------<  197<H>  3.6   |  24  |  0.54    Ca    8.7      2022 04:47  Phos  2.0       Mg     2.3         TPro  6.7  /  Alb  2.2<L>  /  TBili  3.6<H>  /  DBili  x   /  AST  34  /  ALT  62<H>  /  AlkPhos  74            Urinalysis Basic - ( 2022 16:21 )    Color: Tania / Appearance: Clear / S.020 / pH: x  Gluc: x / Ketone: Negative  / Bili: Small / Urobili: 1   Blood: x / Protein: 30 mg/dL / Nitrite: Negative   Leuk Esterase: Negative / RBC: 0-2 /HPF / WBC 0-2 /HPF   Sq Epi: x / Non Sq Epi: Occasional /HPF / Bacteria: Moderate /HPF      CAPILLARY BLOOD GLUCOSE      POCT Blood Glucose.: 192 mg/dL (2022 06:45)  POCT Blood Glucose.: 200 mg/dL (2022 20:52)  POCT Blood Glucose.: 198 mg/dL (2022 15:52)  POCT Blood Glucose.: 170 mg/dL (2022 10:52)      RADIOLOGY & ADDITIONAL STUDIES: Patient is a 31y old  Male who presents with a chief complaint of Abdominal pain (2022 07:06)      HPI:  31yoM with h/o DMon oral medications (unsure about name and dose), presents generalized though possibly slightly epigastric-predominant abdominal pain x 3 days. Worsened with laying and standing, non-food related per se. Patient reports vomiting episode and persistent nausea.  Also notes constipation (last BM yesterday) and low urine output.   Patient also reports unable to pass flatus and had last BM on 1 day ago.   Patient reports he had similar complaints 2 years ago but it was not this bad and did not require hospitalization. Found to have hypertriglyceridemia / pancreatitis and uncont dm . Pt takes janumet as out pt . No fsg monitoring . last MD visit about 8 mths ago.     ED Course:  TG 1984, Lipase 4550 , Na 128  ICU Vital Signs Last 24 Hrs  T(C): 36.7 (2022 07:00), Max: 37 (2022 04:18)  T(F): 98 (2022 07:00), Max: 98.6 (2022 04:18)  HR: 88 (2022 07:00) (88 - 94)  BP: 137/82 (2022 07:00) (137/82 - 151/98)  RR: 18 (2022 07:00) (18 - 18)  SpO2: 99% (2022 07:00) (98% - 99%)     (2022 08:38)      PAST MEDICAL & SURGICAL HISTORY:  Diabetes           MEDICATIONS  (STANDING):  chlorhexidine 2% Cloths 1 Application(s) Topical <User Schedule>  dextrose 5%. 1000 milliLiter(s) (100 mL/Hr) IV Continuous <Continuous>  enoxaparin Injectable 40 milliGRAM(s) SubCutaneous daily  gemfibrozil 600 milliGRAM(s) Oral every 12 hours  glucagon  Injectable 1 milliGRAM(s) IntraMuscular once  insulin glargine Injectable (LANTUS) 15 Unit(s) SubCutaneous at bedtime  insulin lispro (ADMELOG) corrective regimen sliding scale   SubCutaneous three times a day before meals  insulin lispro Injectable (ADMELOG) 2 Unit(s) SubCutaneous three times a day before meals  pantoprazole  Injectable 40 milliGRAM(s) IV Push daily  potassium phosphate / sodium phosphate Powder (PHOS-NaK) 1 Packet(s) Oral every 12 hours    MEDICATIONS  (PRN):  ketorolac   Injectable 15 milliGRAM(s) IV Push every 8 hours PRN Moderate Pain (4 - 6)  traMADol 25 milliGRAM(s) Oral every 6 hours PRN Severe Pain (7 - 10)      FAMILY HISTORY:      SOCIAL HISTORY:      REVIEW OF SYSTEMS:  CONSTITUTIONAL: No fever, weight loss, or fatigue  EYES: No eye pain, visual disturbances, or discharge  ENT:  No difficulty hearing, tinnitus, vertigo; No sinus or throat pain  NECK: No pain or stiffness  RESPIRATORY: No cough, wheezing, chills or hemoptysis; No Shortness of Breath  CARDIOVASCULAR: No chest pain, palpitations, passing out, dizziness, or leg swelling  GASTROINTESTINAL: No abdominal or epigastric pain. No nausea, vomiting, or hematemesis; No diarrhea or constipation. No melena or hematochezia.  GENITOURINARY: No dysuria, frequency, hematuria, or incontinence  NEUROLOGICAL: No headaches, memory loss, loss of strength, numbness, or tremors  SKIN: No itching, burning, rashes, or lesions   LYMPH Nodes: No enlarged glands  ENDOCRINE: No heat or cold intolerance; No hair loss  MUSCULOSKELETAL: No joint pain or swelling; No muscle, back, or extremity pain  PSYCHIATRIC: No depression, anxiety, mood swings, or difficulty sleeping  HEME/LYMPH: No easy bruising, or bleeding gums  ALLERGY AND IMMUNOLOGIC: No hives or eczema	        Vital Signs Last 24 Hrs  T(C): 37.1 (2022 04:00), Max: 38.2 (2022 23:00)  T(F): 98.7 (2022 04:00), Max: 100.8 (2022 23:00)  HR: 87 (2022 10:00) (84 - 108)  BP: 135/74 (2022 10:00) (118/52 - 148/88)  BP(mean): 87 (2022 10:00) (67 - 103)  RR: 16 (2022 09:00) (13 - 27)  SpO2: 97% (2022 10:00) (92% - 98%)      Constitutional:    HEENT: nad    Neck:  No JVD, bruits or thyromegaly    Respiratory:  Clear without rales or rhonchi    Cardiovascular:  RR without murmur, rub or gallop.    Gastrointestinal: Soft without hepatosplenomegaly.    Extremities: without cyanosis, clubbing or edema.    Neurological:  Oriented   x  3    . No gross sensory or motor defects.        LABS:                        12.1   10.70 )-----------( 185      ( 2022 04:47 )             36.6         132<L>  |  100  |  9   ----------------------------<  197<H>  3.6   |  24  |  0.54    Ca    8.7      2022 04:47  Phos  2.0       Mg     2.3         TPro  6.7  /  Alb  2.2<L>  /  TBili  3.6<H>  /  DBili  x   /  AST  34  /  ALT  62<H>  /  AlkPhos  74            Urinalysis Basic - ( 2022 16:21 )    Color: Tania / Appearance: Clear / S.020 / pH: x  Gluc: x / Ketone: Negative  / Bili: Small / Urobili: 1   Blood: x / Protein: 30 mg/dL / Nitrite: Negative   Leuk Esterase: Negative / RBC: 0-2 /HPF / WBC 0-2 /HPF   Sq Epi: x / Non Sq Epi: Occasional /HPF / Bacteria: Moderate /HPF      CAPILLARY BLOOD GLUCOSE      POCT Blood Glucose.: 192 mg/dL (2022 06:45)  POCT Blood Glucose.: 200 mg/dL (2022 20:52)  POCT Blood Glucose.: 198 mg/dL (2022 15:52)  POCT Blood Glucose.: 170 mg/dL (2022 10:52)      RADIOLOGY & ADDITIONAL STUDIES:

## 2022-02-23 NOTE — DIETITIAN INITIAL EVALUATION ADULT. - PERTINENT LABORATORY DATA
02-23 Na132 mmol/L<L> Glu 197 mg/dL<H> K+ 3.6 mmol/L Cr  0.54 mg/dL BUN 9 mg/dL   02-23 Phos 2.0 mg/dL<L>   02-23 Alb 2.2 g/dL<L>     02-22 Chol --    LDL --    HDL --    Trig 408 mg/dL<H>    02-22-22 @ 09:23 HgbA1C 9.5

## 2022-02-23 NOTE — DIETITIAN INITIAL EVALUATION ADULT. - ADD RECOMMEND
Diet advancement per MD to include consistent CHO)/ low fat. (Pt asleep/ ICU: clears (DM). A1c 9.5/ high TG. RD consult prior to D/C for diet ed (preferably 48 hrs prior or when advanced to solids if not done, as appropriate) MD to monitor. RD available. Diet advancement per MD to include consistent CHO)/ low fat. Consider alternate nutrition if unable to progress past unsupplemented clears/ prolonged. Pt asleep/ ICU: clears (DM). A1c 9.5/ high TG. RD consult prior to D/C for diet ed (preferably 48 hrs prior or when advanced to solids if not done, as appropriate) MD to monitor. RD available.

## 2022-02-23 NOTE — CONSULT NOTE ADULT - ASSESSMENT
31yoM with h/o DMon oral medications (unsure about name and dose), presents generalized though possibly slightly epigastric-predominant abdominal pain x 3 days.  31yoM with h/o DMon oral medications (unsure about name and dose), presents generalized though possibly slightly epigastric-predominant abdominal pain x 3 days.  Found to have hypertriglyceridemia / pancreatitis and uncont dm . Pt takes janumet as out pt . No fsg monitoring . last MD visit about 8 mths ago.

## 2022-02-23 NOTE — PHARMACOTHERAPY INTERVENTION NOTE - PROVIDER CONTACTED
Nila Valentino
Viral Illness, Pediatric  Viruses are tiny germs that can get into a person's body and cause illness. There are many different types of viruses, and they cause many types of illness. Viral illness in children is very common. A viral illness can cause fever, sore throat, cough, rash, or diarrhea. Most viral illnesses that affect children are not serious. Most go away after several days without treatment.    The most common types of viruses that affect children are:    Cold and flu viruses.  Stomach viruses.  Viruses that cause fever and rash. These include illnesses such as measles, rubella, roseola, fifth disease, and chicken pox.    What are the causes?  Many types of viruses can cause illness. Viruses invade cells in your child's body, multiply, and cause the infected cells to malfunction or die. When the cell dies, it releases more of the virus. When this happens, your child develops symptoms of the illness, and the virus continues to spread to other cells. If the virus takes over the function of the cell, it can cause the cell to divide and grow out of control, as is the case when a virus causes cancer.    Different viruses get into the body in different ways. Your child is most likely to catch a virus from being exposed to another person who is infected with a virus. This may happen at home, at school, or at . Your child may get a virus by:    Breathing in droplets that have been coughed or sneezed into the air by an infected person. Cold and flu viruses, as well as viruses that cause fever and rash, are often spread through these droplets.  Touching anything that has been contaminated with the virus and then touching his or her nose, mouth, or eyes. Objects can be contaminated with a virus if:    They have droplets on them from a recent cough or sneeze of an infected person.  They have been in contact with the vomit or stool (feces) of an infected person. Stomach viruses can spread through vomit or stool.    Eating or drinking anything that has been in contact with the virus.  Being bitten by an insect or animal that carries the virus.  Being exposed to blood or fluids that contain the virus, either through an open cut or during a transfusion.    What are the signs or symptoms?  Symptoms vary depending on the type of virus and the location of the cells that it invades. Common symptoms of the main types of viral illnesses that affect children include:    Cold and flu viruses     Fever.  Sore throat.  Aches and headache.  Stuffy nose.  Earache.  Cough.  Stomach viruses     Fever.  Loss of appetite.  Vomiting.  Stomachache.  Diarrhea.  Fever and rash viruses     Fever.  Swollen glands.  Rash.  Runny nose.  How is this treated?  Most viral illnesses in children go away within 3?10 days. In most cases, treatment is not needed. Your child's health care provider may suggest over-the-counter medicines to relieve symptoms.    A viral illness cannot be treated with antibiotic medicines. Viruses live inside cells, and antibiotics do not get inside cells. Instead, antiviral medicines are sometimes used to treat viral illness, but these medicines are rarely needed in children.    Many childhood viral illnesses can be prevented with vaccinations (immunization shots). These shots help prevent flu and many of the fever and rash viruses.    Follow these instructions at home:  Medicines     Give over-the-counter and prescription medicines only as told by your child's health care provider. Cold and flu medicines are usually not needed. If your child has a fever, ask the health care provider what over-the-counter medicine to use and what amount (dosage) to give.  Do not give your child aspirin because of the association with Reye syndrome.  If your child is older than 4 years and has a cough or sore throat, ask the health care provider if you can give cough drops or a throat lozenge.  Do not ask for an antibiotic prescription if your child has been diagnosed with a viral illness. That will not make your child's illness go away faster. Also, frequently taking antibiotics when they are not needed can lead to antibiotic resistance. When this develops, the medicine no longer works against the bacteria that it normally fights.  Eating and drinking     Image   If your child is vomiting, give only sips of clear fluids. Offer sips of fluid frequently. Follow instructions from your child's health care provider about eating or drinking restrictions.  If your child is able to drink fluids, have the child drink enough fluid to keep his or her urine clear or pale yellow.  General instructions     Make sure your child gets a lot of rest.  If your child has a stuffy nose, ask your child's health care provider if you can use salt-water nose drops or spray.  If your child has a cough, use a cool-mist humidifier in your child's room.  If your child is older than 1 year and has a cough, ask your child's health care provider if you can give teaspoons of honey and how often.  Keep your child home and rested until symptoms have cleared up. Let your child return to normal activities as told by your child's health care provider.  Keep all follow-up visits as told by your child's health care provider. This is important.  How is this prevented?  ImageTo reduce your child's risk of viral illness:    Teach your child to wash his or her hands often with soap and water. If soap and water are not available, he or she should use hand .  Teach your child to avoid touching his or her nose, eyes, and mouth, especially if the child has not washed his or her hands recently.  If anyone in the household has a viral infection, clean all household surfaces that may have been in contact with the virus. Use soap and hot water. You may also use diluted bleach.  Keep your child away from people who are sick with symptoms of a viral infection.  Teach your child to not share items such as toothbrushes and water bottles with other people.  Keep all of your child's immunizations up to date.  Have your child eat a healthy diet and get plenty of rest.    Contact a health care provider if:  Your child has symptoms of a viral illness for longer than expected. Ask your child's health care provider how long symptoms should last.  Treatment at home is not controlling your child's symptoms or they are getting worse.  Get help right away if:  Your child who is younger than 3 months has a temperature of 100°F (38°C) or higher.  Your child has vomiting that lasts more than 24 hours.  Your child has trouble breathing.  Your child has a severe headache or has a stiff neck.  This information is not intended to replace advice given to you by your health care provider. Make sure you discuss any questions you have with your health care provider.

## 2022-02-24 LAB
ALBUMIN SERPL ELPH-MCNC: 2.2 G/DL — LOW (ref 3.5–5)
ALP SERPL-CCNC: 101 U/L — SIGNIFICANT CHANGE UP (ref 40–120)
ALT FLD-CCNC: 59 U/L DA — SIGNIFICANT CHANGE UP (ref 10–60)
ANION GAP SERPL CALC-SCNC: 9 MMOL/L — SIGNIFICANT CHANGE UP (ref 5–17)
AST SERPL-CCNC: 35 U/L — SIGNIFICANT CHANGE UP (ref 10–40)
BILIRUB SERPL-MCNC: 2.6 MG/DL — HIGH (ref 0.2–1.2)
BUN SERPL-MCNC: 13 MG/DL — SIGNIFICANT CHANGE UP (ref 7–18)
CALCIUM SERPL-MCNC: 8.9 MG/DL — SIGNIFICANT CHANGE UP (ref 8.4–10.5)
CHLORIDE SERPL-SCNC: 100 MMOL/L — SIGNIFICANT CHANGE UP (ref 96–108)
CO2 SERPL-SCNC: 23 MMOL/L — SIGNIFICANT CHANGE UP (ref 22–31)
CREAT SERPL-MCNC: 0.52 MG/DL — SIGNIFICANT CHANGE UP (ref 0.5–1.3)
GLUCOSE BLDC GLUCOMTR-MCNC: 170 MG/DL — HIGH (ref 70–99)
GLUCOSE BLDC GLUCOMTR-MCNC: 175 MG/DL — HIGH (ref 70–99)
GLUCOSE BLDC GLUCOMTR-MCNC: 190 MG/DL — HIGH (ref 70–99)
GLUCOSE BLDC GLUCOMTR-MCNC: 191 MG/DL — HIGH (ref 70–99)
GLUCOSE BLDC GLUCOMTR-MCNC: 202 MG/DL — HIGH (ref 70–99)
GLUCOSE SERPL-MCNC: 212 MG/DL — HIGH (ref 70–99)
HCT VFR BLD CALC: 36 % — LOW (ref 39–50)
HGB BLD-MCNC: 12 G/DL — LOW (ref 13–17)
MAGNESIUM SERPL-MCNC: 2.3 MG/DL — SIGNIFICANT CHANGE UP (ref 1.6–2.6)
MCHC RBC-ENTMCNC: 27.4 PG — SIGNIFICANT CHANGE UP (ref 27–34)
MCHC RBC-ENTMCNC: 33.3 GM/DL — SIGNIFICANT CHANGE UP (ref 32–36)
MCV RBC AUTO: 82.2 FL — SIGNIFICANT CHANGE UP (ref 80–100)
NRBC # BLD: 0 /100 WBCS — SIGNIFICANT CHANGE UP (ref 0–0)
PHOSPHATE SERPL-MCNC: 3 MG/DL — SIGNIFICANT CHANGE UP (ref 2.5–4.5)
PLATELET # BLD AUTO: 255 K/UL — SIGNIFICANT CHANGE UP (ref 150–400)
POTASSIUM SERPL-MCNC: 3.3 MMOL/L — LOW (ref 3.5–5.3)
POTASSIUM SERPL-SCNC: 3.3 MMOL/L — LOW (ref 3.5–5.3)
PROT SERPL-MCNC: 7.2 G/DL — SIGNIFICANT CHANGE UP (ref 6–8.3)
RBC # BLD: 4.38 M/UL — SIGNIFICANT CHANGE UP (ref 4.2–5.8)
RBC # FLD: 13.6 % — SIGNIFICANT CHANGE UP (ref 10.3–14.5)
SODIUM SERPL-SCNC: 132 MMOL/L — LOW (ref 135–145)
WBC # BLD: 10.61 K/UL — HIGH (ref 3.8–10.5)
WBC # FLD AUTO: 10.61 K/UL — HIGH (ref 3.8–10.5)

## 2022-02-24 RX ORDER — SENNA PLUS 8.6 MG/1
2 TABLET ORAL AT BEDTIME
Refills: 0 | Status: DISCONTINUED | OUTPATIENT
Start: 2022-02-24 | End: 2022-03-02

## 2022-02-24 RX ORDER — POTASSIUM CHLORIDE 20 MEQ
40 PACKET (EA) ORAL ONCE
Refills: 0 | Status: COMPLETED | OUTPATIENT
Start: 2022-02-24 | End: 2022-02-24

## 2022-02-24 RX ADMIN — Medication 600 MILLIGRAM(S): at 05:19

## 2022-02-24 RX ADMIN — TRAMADOL HYDROCHLORIDE 25 MILLIGRAM(S): 50 TABLET ORAL at 23:06

## 2022-02-24 RX ADMIN — Medication 4: at 12:00

## 2022-02-24 RX ADMIN — PANTOPRAZOLE SODIUM 40 MILLIGRAM(S): 20 TABLET, DELAYED RELEASE ORAL at 13:53

## 2022-02-24 RX ADMIN — CHLORHEXIDINE GLUCONATE 1 APPLICATION(S): 213 SOLUTION TOPICAL at 05:20

## 2022-02-24 RX ADMIN — Medication 600 MILLIGRAM(S): at 17:37

## 2022-02-24 RX ADMIN — TRAMADOL HYDROCHLORIDE 25 MILLIGRAM(S): 50 TABLET ORAL at 21:49

## 2022-02-24 RX ADMIN — SENNA PLUS 2 TABLET(S): 8.6 TABLET ORAL at 21:53

## 2022-02-24 RX ADMIN — Medication 15 MILLIGRAM(S): at 07:01

## 2022-02-24 RX ADMIN — Medication 15 MILLIGRAM(S): at 06:15

## 2022-02-24 RX ADMIN — TRAMADOL HYDROCHLORIDE 25 MILLIGRAM(S): 50 TABLET ORAL at 13:00

## 2022-02-24 RX ADMIN — TRAMADOL HYDROCHLORIDE 25 MILLIGRAM(S): 50 TABLET ORAL at 12:01

## 2022-02-24 RX ADMIN — INSULIN GLARGINE 15 UNIT(S): 100 INJECTION, SOLUTION SUBCUTANEOUS at 21:52

## 2022-02-24 RX ADMIN — Medication 2: at 17:35

## 2022-02-24 RX ADMIN — Medication 15 MILLIGRAM(S): at 23:36

## 2022-02-24 RX ADMIN — Medication 15 MILLIGRAM(S): at 15:46

## 2022-02-24 RX ADMIN — Medication 2: at 07:45

## 2022-02-24 RX ADMIN — ENOXAPARIN SODIUM 40 MILLIGRAM(S): 100 INJECTION SUBCUTANEOUS at 13:52

## 2022-02-24 RX ADMIN — Medication 15 MILLIGRAM(S): at 17:32

## 2022-02-24 RX ADMIN — Medication 2 UNIT(S): at 07:45

## 2022-02-24 RX ADMIN — Medication 2 UNIT(S): at 12:00

## 2022-02-24 RX ADMIN — Medication 2 UNIT(S): at 17:36

## 2022-02-24 RX ADMIN — Medication 40 MILLIEQUIVALENT(S): at 05:19

## 2022-02-24 RX ADMIN — Medication 15 MILLIGRAM(S): at 07:02

## 2022-02-24 NOTE — PROGRESS NOTE ADULT - ASSESSMENT
ASSESSMENT AND PLAN: 30 YO with medical history of DM2 admitted to ICU for hypertriglyceridemia requiring Insulin drip     Acute Pancreatitis   Hypertriglyceridemia   Hyponatremia   Hyperglycemia   Transaminitis  Dm2  Obesity        Neuro  AAO 3 at baseline, No issues     Cardiovascular  No issues at present  normotensive    Pulmonary  No issues    Infections  No issues       Nephro  Patient reports decreased urine output,, not been eating or drinking from last 3 days       #Hyponatremia   na 128, corrected for blood glucose is 132  Also a component of poor oral intake and vomiting   resolved  monitor BMP     # Hypokalemia  3.3 today  replaced  f/u BMP tomorrow      # Hypophosphatemia  corrected-resolved       Gastrointestinal  #Acute Pancreatitis   Patient coming in with abdominal pain, elevated lipase, CT abdomen shows aurora pancreatic fluid and acute pancreatitis  Most likely the cause is hypertriglyceridemia and alcoholism   s/p 2L NS bolus, later switched to D5 1/2 NS at 100ml./hr  Given Lipase and TGC trending down, switched insulin drip to lantus 10 U at bedtime + SSI (mod scale)  readjusted regimen today to lantus 15U + admelog 2U premeal + SSI (mod scale)  BG controlled  monitor FS q 6 hrs  on clear liquid diet now, as he did not tolerate regular diet due to pain  Pain management w/ toradol and tramadol     #Transaminitis   Likely due to pancreatitis,   LFT stable  elevated T esequiel  RUQ US showed evidence of hepatic steatosis, hepatomegaly. No evidence of biliary ductal tube dilation or cholecystitis   Monitor liver function          Heme  No issues     Endocrine  #Hypertriglyceridemia   Trending down  on Gemfibrozil 600 mg q 12 hrs      #Dm2  On Janumet at home  Ac1 9.5  Patient on insulin drip and D5 1/2 NS,   readjusted regimen today to lantus 15U + admelog 2U premeal + SSI (mod scale)  Monitor FS AC HS  Endo (Dr. Reyes) consulted recommending pt not to be DC on janumet. On DC recommended Metformin + low dose sulfonylurea         Prophylaxis   Lovenox 40mg SQ   Protonix 40mg for GI     Disposition:  transfer to medicine floor

## 2022-02-24 NOTE — CHART NOTE - NSCHARTNOTEFT_GEN_A_CORE
31 yoM with h/o DM on Janumet, presented w/ generalized epigastric-predominant abdominal pain x 3 days. Worsened with laying and standing, non-food related per se. Patient reports vomiting episode and persistent nausea. Patient reports he had similar complaints 2 years ago but it was not this bad and did not require hospitalization. In the ED pt was found afebrile, HR 88, /82, Sat 99% on RA. Labs on admission significant for Na 128,  TG 1894, Lipase 4550. CT A/P showed moderate peripancreatic stranding and edema c/w pancreatitis. In the ED pt received 2L NS bolus. Pt was admitted to ICU for pancreatitis requiring insulin drip.       While in the ICU pt was initially NPO, pain controlled with dilaudid initially, then switched to tramadol and toradol. While on insulin drip + D5 1/2 NS.  BG was monitored q 1-2 hrs, remained wnl. Lipase and TGC down trending. Once pt reached triglycerides of 647 he was switched to lantus 10 U + SSI moderate scale. Also was started on Gemfibrozil 600 mg q 12 hrs. Given elevated BG levels readjusted regimen to lantus 15U + Admelog 2U + SSI (mod).  He was started on clear liquid diet later switched to regular low fat diet (which he did not tolerate well d/t pain), back on clear liquid diet tolerating well so far. Endocrinology Dr. Reyes consulted, recommended that pt should not continue janumet on discharge, could be managed with  metformin and low dose sulfonylurea upon d/c. Due to  elevated Tbil, RUQ was ordered, which showed hepatomegalia w/ hepatic steatosis. It did not show evidence of cholelythiasis or biliary duct dilation.     Given pt no longer requiring insulin drip he is stable to be DG to medicine floor. 31 yoM with h/o DM on Janumet, presented w/ generalized epigastric-predominant abdominal pain x 3 days. Worsened with laying and standing, non-food related per se. Patient reports vomiting episode and persistent nausea. Patient reports he had similar complaints 2 years ago but it was not this bad and did not require hospitalization. In the ED pt was found afebrile, HR 88, /82, Sat 99% on RA. Labs on admission significant for Na 128,  TG 1894, Lipase 4550. CT A/P showed moderate peripancreatic stranding and edema c/w pancreatitis. In the ED pt received 2L NS bolus. Pt was admitted to ICU for pancreatitis requiring insulin drip.       While in the ICU pt was initially NPO, pain controlled with dilaudid initially, then switched to tramadol and toradol. While on insulin drip + D5 1/2 NS.  BG was monitored q 1-2 hrs, remained wnl. Lipase and TGC down trending. Once pt reached triglycerides of 647 he was switched to lantus 10 U + SSI moderate scale. Also was started on Gemfibrozil 600 mg q 12 hrs. Given elevated BG levels readjusted regimen to lantus 15U + Admelog 2U + SSI (mod).  He was started on clear liquid diet later switched to regular low fat diet (which he did not tolerate well d/t pain), back on clear liquid diet tolerating well so far. Endocrinology Dr. Reyes consulted, recommended that pt should not continue janumet on discharge, could be managed with  metformin and low dose sulfonylurea upon d/c. Due to  elevated Tbil, RUQ was ordered, which showed hepatomegalia w/ hepatic steatosis. It did not show evidence of cholelythiasis or biliary duct dilation.     Given pt no longer requiring insulin drip he is stable to be DG to medicine floor.      Case was d/w Dr. Issa (attending physician) and  floor NP

## 2022-02-24 NOTE — CHART NOTE - NSCHARTNOTEFT_GEN_A_CORE
EVENT: Pt transferred to .  Signout received from Dr. Valentino    SUBJECTIVE:  Pt seen w/ wife at bedside.  Denies abdominal pain at this time.  Prefers liquid diet today.    OBJECTIVE:  Vital Signs Last 24 Hrs  T(C): 36.7 (24 Feb 2022 13:12), Max: 37 (24 Feb 2022 00:00)  T(F): 98.1 (24 Feb 2022 13:12), Max: 98.6 (24 Feb 2022 00:00)  HR: 78 (24 Feb 2022 13:12) (78 - 78)  BP: 130/74 (24 Feb 2022 13:12) (87/60 - 143/99)  BP(mean): 86 (24 Feb 2022 08:00) (66 - 108)  RR: 18 (24 Feb 2022 13:12) (18 - 18)  SpO2: 99% (24 Feb 2022 13:12) (91% - 99%)    LABS:                        12.0   10.61 )-----------( 255      ( 24 Feb 2022 04:03 )             36.0     02-24    132<L>  |  100  |  13  ----------------------------<  212<H>  3.3<L>   |  23  |  0.52    Ca    8.9      24 Feb 2022 04:03  Phos  3.0     02-24  Mg     2.3     02-24    TPro  7.2  /  Alb  2.2<L>  /  TBili  2.6<H>  /  DBili  x   /  AST  35  /  ALT  59  /  AlkPhos  101  02-24      EKG:   IMGAGING:    ASSESSMENT:  31 yoM with h/o DM on Janumet, presented w/ generalized epigastric-predominant abdominal pain x 3 days. Worsened with laying and standing, non-food related per se. Patient reports vomiting episode and persistent nausea. Patient reports he had similar complaints 2 years ago but it was not this bad and did not require hospitalization. In the ED pt was found afebrile, HR 88, /82, Sat 99% on RA. Labs on admission significant for Na 128,  TG 1894, Lipase 4550. CT A/P showed moderate peripancreatic stranding and edema c/w pancreatitis. In the ED pt received 2L NS bolus. Pt was admitted to ICU for pancreatitis requiring insulin drip.         PLAN:    Continue current diet. Pt stable.

## 2022-02-24 NOTE — PROGRESS NOTE ADULT - SUBJECTIVE AND OBJECTIVE BOX
INTERVAL HPI/OVERNIGHT EVENTS: ***    PRESSORS: [ ] YES [ ] NO  WHICH:    Antimicrobial:    Cardiovascular:    Pulmonary:    Hematalogic:  enoxaparin Injectable 40 milliGRAM(s) SubCutaneous daily    Other:  chlorhexidine 2% Cloths 1 Application(s) Topical <User Schedule>  dextrose 5%. 1000 milliLiter(s) IV Continuous <Continuous>  gemfibrozil 600 milliGRAM(s) Oral every 12 hours  glucagon  Injectable 1 milliGRAM(s) IntraMuscular once  insulin glargine Injectable (LANTUS) 15 Unit(s) SubCutaneous at bedtime  insulin lispro (ADMELOG) corrective regimen sliding scale   SubCutaneous three times a day before meals  insulin lispro Injectable (ADMELOG) 2 Unit(s) SubCutaneous three times a day before meals  ketorolac   Injectable 15 milliGRAM(s) IV Push every 8 hours PRN  pantoprazole  Injectable 40 milliGRAM(s) IV Push daily  traMADol 25 milliGRAM(s) Oral every 6 hours PRN    chlorhexidine 2% Cloths 1 Application(s) Topical <User Schedule>  dextrose 5%. 1000 milliLiter(s) IV Continuous <Continuous>  enoxaparin Injectable 40 milliGRAM(s) SubCutaneous daily  gemfibrozil 600 milliGRAM(s) Oral every 12 hours  glucagon  Injectable 1 milliGRAM(s) IntraMuscular once  insulin glargine Injectable (LANTUS) 15 Unit(s) SubCutaneous at bedtime  insulin lispro (ADMELOG) corrective regimen sliding scale   SubCutaneous three times a day before meals  insulin lispro Injectable (ADMELOG) 2 Unit(s) SubCutaneous three times a day before meals  ketorolac   Injectable 15 milliGRAM(s) IV Push every 8 hours PRN  pantoprazole  Injectable 40 milliGRAM(s) IV Push daily  traMADol 25 milliGRAM(s) Oral every 6 hours PRN    Drug Dosing Weight  Height (cm): 170.1 (23 Feb 2022 11:01)  Weight (kg): 107 (20 Feb 2022 04:18)  BMI (kg/m2): 37 (23 Feb 2022 11:01)  BSA (m2): 2.17 (23 Feb 2022 11:01)    CENTRAL LINE: [ ] YES [ ] NO  LOCATION:   DATE INSERTED:  REMOVE: [ ] YES [ ] NO  EXPLAIN:    DUNN: [ ] YES [ ] NO    DATE INSERTED:  REMOVE:  [ ] YES [ ] NO  EXPLAIN:    A-LINE:  [ ] YES [ ] NO  LOCATION:   DATE INSERTED:  REMOVE:  [ ] YES [ ] NO  EXPLAIN:    PMH -reviewed admission note, no change since admission  PAST MEDICAL & SURGICAL HISTORY:  Diabetes        ICU Vital Signs Last 24 Hrs  T(C): 36.8 (24 Feb 2022 04:00), Max: 37.3 (23 Feb 2022 16:20)  T(F): 98.2 (24 Feb 2022 04:00), Max: 99.2 (23 Feb 2022 16:20)  HR: 94 (23 Feb 2022 15:00) (84 - 98)  BP: 138/86 (24 Feb 2022 06:00) (87/60 - 143/99)  BP(mean): 99 (24 Feb 2022 06:00) (66 - 108)  ABP: --  ABP(mean): --  RR: 16 (23 Feb 2022 09:00) (16 - 16)  SpO2: 96% (24 Feb 2022 06:00) (93% - 99%)            02-23 @ 07:01  -  02-24 @ 07:00  --------------------------------------------------------  IN: 600 mL / OUT: 600 mL / NET: 0 mL            PHYSICAL EXAM:    GENERAL: NAD, well-groomed, well-developed  HEAD:  Atraumatic, Normocephalic  EYES: EOMI, PERRLA, conjunctiva and sclera clear  ENMT: No tonsillar erythema, exudates, or enlargement; Moist mucous membranes, Good dentition, No lesions  NECK: Supple, normal appearance, No JVD; Normal thyroid; Trachea midline  NERVOUS SYSTEM:  Alert & Oriented X3,  Motor Strength 5/5 B/L upper and lower extremities; DTRs 2+ intact and symmetric  CHEST/LUNG: No chest deformity; Normal percussion bilaterally; No rales, rhonchi, wheezing   HEART: Regular rate and rhythm; No murmurs, rubs, or gallops  ABDOMEN: Soft, Nontender, Nondistended; Bowel sounds present  EXTREMITIES:  2+ Peripheral Pulses, No clubbing, cyanosis, or edema  LYMPH: No lymphadenopathy noted  SKIN: No rashes or lesions;  Good capillary refill      LABS:  CBC Full  -  ( 24 Feb 2022 04:03 )  WBC Count : 10.61 K/uL  RBC Count : 4.38 M/uL  Hemoglobin : 12.0 g/dL  Hematocrit : 36.0 %  Platelet Count - Automated : 255 K/uL  Mean Cell Volume : 82.2 fl  Mean Cell Hemoglobin : 27.4 pg  Mean Cell Hemoglobin Concentration : 33.3 gm/dL  Auto Neutrophil # : x  Auto Lymphocyte # : x  Auto Monocyte # : x  Auto Eosinophil # : x  Auto Basophil # : x  Auto Neutrophil % : x  Auto Lymphocyte % : x  Auto Monocyte % : x  Auto Eosinophil % : x  Auto Basophil % : x    02-24    132<L>  |  100  |  13  ----------------------------<  212<H>  3.3<L>   |  23  |  0.52    Ca    8.9      24 Feb 2022 04:03  Phos  3.0     02-24  Mg     2.3     02-24    TPro  7.2  /  Alb  2.2<L>  /  TBili  2.6<H>  /  DBili  x   /  AST  35  /  ALT  59  /  AlkPhos  101  02-24            RADIOLOGY & ADDITIONAL STUDIES REVIEWED:  ***    [ ]GOALS OF CARE DISCUSSION WITH PATIENT/FAMILY/PROXY:    CRITICAL CARE TIME SPENT: 35 minutes INTERVAL HPI/OVERNIGHT EVENTS: no overnight events. Pt seen and examined at bedside, feels ok. Endorses constipation, today agreeable for medication for bowel regimen    PRESSORS: [ ] YES [ x] NO  WHICH:    Antimicrobial:    Cardiovascular:    Pulmonary:    Hematalogic:  enoxaparin Injectable 40 milliGRAM(s) SubCutaneous daily    Other:  chlorhexidine 2% Cloths 1 Application(s) Topical <User Schedule>  dextrose 5%. 1000 milliLiter(s) IV Continuous <Continuous>  gemfibrozil 600 milliGRAM(s) Oral every 12 hours  glucagon  Injectable 1 milliGRAM(s) IntraMuscular once  insulin glargine Injectable (LANTUS) 15 Unit(s) SubCutaneous at bedtime  insulin lispro (ADMELOG) corrective regimen sliding scale   SubCutaneous three times a day before meals  insulin lispro Injectable (ADMELOG) 2 Unit(s) SubCutaneous three times a day before meals  ketorolac   Injectable 15 milliGRAM(s) IV Push every 8 hours PRN  pantoprazole  Injectable 40 milliGRAM(s) IV Push daily  traMADol 25 milliGRAM(s) Oral every 6 hours PRN    chlorhexidine 2% Cloths 1 Application(s) Topical <User Schedule>  dextrose 5%. 1000 milliLiter(s) IV Continuous <Continuous>  enoxaparin Injectable 40 milliGRAM(s) SubCutaneous daily  gemfibrozil 600 milliGRAM(s) Oral every 12 hours  glucagon  Injectable 1 milliGRAM(s) IntraMuscular once  insulin glargine Injectable (LANTUS) 15 Unit(s) SubCutaneous at bedtime  insulin lispro (ADMELOG) corrective regimen sliding scale   SubCutaneous three times a day before meals  insulin lispro Injectable (ADMELOG) 2 Unit(s) SubCutaneous three times a day before meals  ketorolac   Injectable 15 milliGRAM(s) IV Push every 8 hours PRN  pantoprazole  Injectable 40 milliGRAM(s) IV Push daily  traMADol 25 milliGRAM(s) Oral every 6 hours PRN    Drug Dosing Weight  Height (cm): 170.1 (23 Feb 2022 11:01)  Weight (kg): 107 (20 Feb 2022 04:18)  BMI (kg/m2): 37 (23 Feb 2022 11:01)  BSA (m2): 2.17 (23 Feb 2022 11:01)    CENTRAL LINE: [ ] YES [x ] NO  LOCATION:   DATE INSERTED:  REMOVE: [ ] YES [ ] NO  EXPLAIN:    DUNN: [ ] YES [x ] NO    DATE INSERTED:  REMOVE:  [ ] YES [ ] NO  EXPLAIN:    A-LINE:  [ ] YES [ x] NO  LOCATION:   DATE INSERTED:  REMOVE:  [ ] YES [ ] NO  EXPLAIN:    PMH -reviewed admission note, no change since admission  PAST MEDICAL & SURGICAL HISTORY:  Diabetes        ICU Vital Signs Last 24 Hrs  T(C): 36.8 (24 Feb 2022 04:00), Max: 37.3 (23 Feb 2022 16:20)  T(F): 98.2 (24 Feb 2022 04:00), Max: 99.2 (23 Feb 2022 16:20)  HR: 94 (23 Feb 2022 15:00) (84 - 98)  BP: 138/86 (24 Feb 2022 06:00) (87/60 - 143/99)  BP(mean): 99 (24 Feb 2022 06:00) (66 - 108)  ABP: --  ABP(mean): --  RR: 16 (23 Feb 2022 09:00) (16 - 16)  SpO2: 96% (24 Feb 2022 06:00) (93% - 99%)            02-23 @ 07:01  -  02-24 @ 07:00  --------------------------------------------------------  IN: 600 mL / OUT: 600 mL / NET: 0 mL            PHYSICAL EXAM:  GENERAL: NAD, obese  HEAD:  Atraumatic, Normocephalic  EYES: EOMI, PERRLA, conjunctiva and sclera clear  ENMT: No tonsillar erythema, exudates, or enlargement; Moist mucous membranes  NECK: Supple, normal appearance, No JVD;  Trachea midline  NERVOUS SYSTEM:  Alert & Oriented X3,  Motor Strength 5/5 B/L upper and lower extremities; DTRs 2+ intact and symmetric  CHEST/LUNG: No chest deformity; Normal percussion bilaterally; No rales, rhonchi, wheezing   HEART: Regular rate and rhythm; No murmurs, rubs, or gallops  ABDOMEN: Increased abdominal girth,  soft,  mildly tender (diffuse pattern), mildly distended; Bowel sounds present  EXTREMITIES:  2+ Peripheral Pulses, No clubbing, cyanosis, or edema  SKIN: No rashes or lesions;  Good capillary refill      LABS:  CBC Full  -  ( 24 Feb 2022 04:03 )  WBC Count : 10.61 K/uL  RBC Count : 4.38 M/uL  Hemoglobin : 12.0 g/dL  Hematocrit : 36.0 %  Platelet Count - Automated : 255 K/uL  Mean Cell Volume : 82.2 fl  Mean Cell Hemoglobin : 27.4 pg  Mean Cell Hemoglobin Concentration : 33.3 gm/dL  Auto Neutrophil # : x  Auto Lymphocyte # : x  Auto Monocyte # : x  Auto Eosinophil # : x  Auto Basophil # : x  Auto Neutrophil % : x  Auto Lymphocyte % : x  Auto Monocyte % : x  Auto Eosinophil % : x  Auto Basophil % : x    02-24    132<L>  |  100  |  13  ----------------------------<  212<H>  3.3<L>   |  23  |  0.52    Ca    8.9      24 Feb 2022 04:03  Phos  3.0     02-24  Mg     2.3     02-24    TPro  7.2  /  Alb  2.2<L>  /  TBili  2.6<H>  /  DBili  x   /  AST  35  /  ALT  59  /  AlkPhos  101  02-24            RADIOLOGY & ADDITIONAL STUDIES REVIEWED:  ***    [ ]GOALS OF CARE DISCUSSION WITH PATIENT/FAMILY/PROXY:    CRITICAL CARE TIME SPENT: 35 minutes

## 2022-02-24 NOTE — CONSULT NOTE ADULT - SUBJECTIVE AND OBJECTIVE BOX
HPI:  31yoM with h/o DMon oral medications (unsure about name and dose), presents generalized though possibly slightly epigastric-predominant abdominal pain x 3 days. Worsened with laying and standing, non-food related per se. Patient reports vomiting episode and persistent nausea.  Also notes constipation (last BM yesterday) and low urine output.   Patient also reports unable to pass flatus and had last BM on 1 day ago.   Patient reports he had similar complaints 2 years ago but it was not this bad and did not require hospitalization.    ED Course:  TG 1984, Lipase 4550 , Na 128  ICU Vital Signs Last 24 Hrs  T(C): 36.7 (20 Feb 2022 07:00), Max: 37 (20 Feb 2022 04:18)  T(F): 98 (20 Feb 2022 07:00), Max: 98.6 (20 Feb 2022 04:18)  HR: 88 (20 Feb 2022 07:00) (88 - 94)  BP: 137/82 (20 Feb 2022 07:00) (137/82 - 151/98)  RR: 18 (20 Feb 2022 07:00) (18 - 18)  SpO2: 99% (20 Feb 2022 07:00) (98% - 99%)     (20 Feb 2022 08:38)      PAST MEDICAL & SURGICAL HISTORY:  Diabetes        No Known Allergies      Social Hx:    FAMILY HISTORY:      dextrose 5%. 1000 milliLiter(s) IV Continuous <Continuous>  enoxaparin Injectable 40 milliGRAM(s) SubCutaneous daily  gemfibrozil 600 milliGRAM(s) Oral every 12 hours  glucagon  Injectable 1 milliGRAM(s) IntraMuscular once  insulin glargine Injectable (LANTUS) 15 Unit(s) SubCutaneous at bedtime  insulin lispro (ADMELOG) corrective regimen sliding scale   SubCutaneous three times a day before meals  insulin lispro Injectable (ADMELOG) 2 Unit(s) SubCutaneous three times a day before meals  ketorolac   Injectable 15 milliGRAM(s) IV Push every 8 hours PRN  pantoprazole  Injectable 40 milliGRAM(s) IV Push daily  senna 2 Tablet(s) Oral at bedtime  traMADol 25 milliGRAM(s) Oral every 6 hours PRN      MEDICATIONS  (PRN):  ketorolac   Injectable 15 milliGRAM(s) IV Push every 8 hours PRN Moderate Pain (4 - 6)  traMADol 25 milliGRAM(s) Oral every 6 hours PRN Severe Pain (7 - 10)      T(C): 36.7 (02-24-22 @ 13:12), Max: 37 (02-24-22 @ 00:00)  HR: 78 (02-24-22 @ 13:12) (78 - 78)  BP: 130/74 (02-24-22 @ 13:12) (87/60 - 143/99)  RR: 18 (02-24-22 @ 13:12) (18 - 18)  SpO2: 99% (02-24-22 @ 13:12) (91% - 99%)        REVIEW OF SYSTEMS:                           ALL ROS DONE [ X   ]    CONSTITUTIONAL:  LETHARGIC [   ], FEVER [   ], UNRESPONSIVE [   ]  CVS:  CP  [   ], SOB, [   ], PALPITATIONS [   ], DIZZYNESS [   ]  RS: COUGH [   ], SPUTUM [   ]  GI: ABDOMINAL PAIN [   ], NAUSEA [   ], VOMITINGS [   ], DIARRHEA [   ], CONSTIPATION [   ]  :  DYSURIA [   ], NOCTURIA [   ], INCREASED FREQUENCY [   ], DRIBLING [   ],  SKELETAL: PAINFUL JOINTS [   ], SWOLLEN JOINTS [   ], NECK ACHE [   ], LOW BACK ACHE [   ],  SKIN : ULCERS [   ], RASH [   ], ITCHING [   ]  CNS: HEAD ACHE [   ], DOUBLE VISION [   ], BLURRED VISION [   ], AMS / CONFUSION [   ], SEIZURES [   ], WEAKNESS [   ],TINGLING / NUMBNESS [   ]    PHYSICAL EXAMINATION:  GENERAL APPEARANCE: NO DISTRESS  HEENT:  NO PALLOR, NO  JVD,  NO   NODES, NECK SUPPLE  CVS: S1 +, S2 +,   RS: AEEB,  OCCASIONAL  RALES +,   NO RONCHI  ABD: SOFT, NT, NO, BS +  EXT: NO PE  SKIN: WARM,   SKELETAL:  ROM ACCEPTABLE  CNS:  AAO X    ,   DEFICITS    RADIOLOGY :      ASSESSMENT :       PLAN:  HPI:  31yoM with h/o DMon oral medications (unsure about name and dose), presents generalized though possibly slightly epigastric-predominant abdominal pain x 3 days. Worsened with laying and standing, non-food related per se. Patient reports vomiting episode and persistent nausea.  Also notes constipation (last BM yesterday) and low urine output.   Patient also reports unable to pass flatus and had last BM on 1 day ago.   Patient reports he had similar complaints 2 years ago but it was not this bad and did not require hospitalization.    ED Course:  TG 1984, Lipase 4550 , Na 128  ICU Vital Signs Last 24 Hrs  T(C): 36.7 (20 Feb 2022 07:00), Max: 37 (20 Feb 2022 04:18)  T(F): 98 (20 Feb 2022 07:00), Max: 98.6 (20 Feb 2022 04:18)  HR: 88 (20 Feb 2022 07:00) (88 - 94)  BP: 137/82 (20 Feb 2022 07:00) (137/82 - 151/98)  RR: 18 (20 Feb 2022 07:00) (18 - 18)  SpO2: 99% (20 Feb 2022 07:00) (98% - 99%)     (20 Feb 2022 08:38)    -      HPI:  31yoM with h/o DMon oral medications (unsure about name and dose), presents generalized though possibly slightly epigastric-predominant abdominal pain x 3 days. Worsened with laying and standing, non-food related per se. Patient reports vomiting episode and persistent nausea.  Also notes constipation (last BM yesterday) and low urine output.   Patient also reports unable to pass flatus and had last BM on 1 day ago.   Patient reports he had similar complaints 2 years ago but it was not this bad and did not require hospitalization.    ED Course:  TG 1984, Lipase 4550 , Na 128  ICU Vital Signs Last 24 Hrs  T(C): 36.7 (20 Feb 2022 07:00), Max: 37 (20 Feb 2022 04:18)  T(F): 98 (20 Feb 2022 07:00), Max: 98.6 (20 Feb 2022 04:18)  HR: 88 (20 Feb 2022 07:00) (88 - 94)  BP: 137/82 (20 Feb 2022 07:00) (137/82 - 151/98)  RR: 18 (20 Feb 2022 07:00) (18 - 18)  SpO2: 99% (20 Feb 2022 07:00) (98% - 99%)     (20 Feb 2022 08:38)      PAST MEDICAL & SURGICAL HISTORY:  Diabetes        No Known Allergies      Social Hx:    FAMILY HISTORY:      dextrose 5%. 1000 milliLiter(s) IV Continuous <Continuous>  enoxaparin Injectable 40 milliGRAM(s) SubCutaneous daily  gemfibrozil 600 milliGRAM(s) Oral every 12 hours  glucagon  Injectable 1 milliGRAM(s) IntraMuscular once  insulin glargine Injectable (LANTUS) 15 Unit(s) SubCutaneous at bedtime  insulin lispro (ADMELOG) corrective regimen sliding scale   SubCutaneous three times a day before meals  insulin lispro Injectable (ADMELOG) 2 Unit(s) SubCutaneous three times a day before meals  ketorolac   Injectable 15 milliGRAM(s) IV Push every 8 hours PRN  pantoprazole  Injectable 40 milliGRAM(s) IV Push daily  senna 2 Tablet(s) Oral at bedtime  traMADol 25 milliGRAM(s) Oral every 6 hours PRN      MEDICATIONS  (PRN):  ketorolac   Injectable 15 milliGRAM(s) IV Push every 8 hours PRN Moderate Pain (4 - 6)  traMADol 25 milliGRAM(s) Oral every 6 hours PRN Severe Pain (7 - 10)      T(C): 36.7 (02-24-22 @ 13:12), Max: 37 (02-24-22 @ 00:00)  HR: 78 (02-24-22 @ 13:12) (78 - 78)  BP: 130/74 (02-24-22 @ 13:12) (87/60 - 143/99)  RR: 18 (02-24-22 @ 13:12) (18 - 18)  SpO2: 99% (02-24-22 @ 13:12) (91% - 99%)        REVIEW OF SYSTEMS:                           ALL ROS DONE [ X   ]    CONSTITUTIONAL:  LETHARGIC [   ], FEVER [   ], UNRESPONSIVE [   ]  CVS:  CP  [   ], SOB, [   ], PALPITATIONS [   ], DIZZYNESS [   ]  RS: COUGH [   ], SPUTUM [   ]  GI: ABDOMINAL PAIN [   ], NAUSEA [   ], VOMITINGS [   ], DIARRHEA [   ], CONSTIPATION [   ]  :  DYSURIA [   ], NOCTURIA [   ], INCREASED FREQUENCY [   ], DRIBLING [   ],  SKELETAL: PAINFUL JOINTS [   ], SWOLLEN JOINTS [   ], NECK ACHE [   ], LOW BACK ACHE [   ],  SKIN : ULCERS [   ], RASH [   ], ITCHING [   ]  CNS: HEAD ACHE [   ], DOUBLE VISION [   ], BLURRED VISION [   ], AMS / CONFUSION [   ], SEIZURES [   ], WEAKNESS [   ],TINGLING / NUMBNESS [   ]    PHYSICAL EXAMINATION:  GENERAL APPEARANCE: NO DISTRESS  HEENT:  NO PALLOR, NO  JVD,  NO   NODES, NECK SUPPLE  CVS: S1 +, S2 +,   RS: AEEB,  OCCASIONAL  RALES +,   NO RONCHI  ABD: SOFT, NO, BS + , MILD EPIGASTRIC TENDERNESS  EXT: NO PE  SKIN: WARM,   SKELETAL:  ROM ACCEPTABLE  CNS:  AAO X 3, NO   DEFICITS    RADIOLOGY :    ACC: [id number] EXAM:  CT ABDOMEN AND PELVIS IC                          PROCEDURE DATE:  02/20/2022          INTERPRETATION:  CLINICAL INFORMATION: Abdominal pain.    COMPARISON: None.    CONTRAST/COMPLICATIONS:  IV Contrast: Omnipaque 350  90 cc administered   10 cc discarded  Oral Contrast: NONE  Complications: None reported at time of study completion      PROCEDURE:  Axial CT images were acquired through the abdomen and pelvis following   intravenous contrast. Coronal and sagittal reformatted images provided..    FINDINGS:  LOWER CHEST: Visualized lung bases demonstrate mild dependent change.  LIVER: Diffuse hepatic steatosis. Enlarged right hepatic lobe.  BILE DUCTS: Normal caliber.  GALLBLADDER: No calcified gallstone.  SPLEEN: Within normal limits.  PANCREAS: There is a moderate peripancreatic fatty stranding and edema   consistent with acute interstitial edematous pancreatitis. There is no   pseudocyst formation.  ADRENALS: Within normal limits.  KIDNEYS/URETERS: Enhance symmetrically without hydronephrosis..    BLADDER: Within normal limits.  REPRODUCTIVE ORGANS: Prostate gland within normal limits. Seminal   vesicles symmetric.    BOWEL: Evaluation of bowel is limited without distention with oral   contrast, however there is no bowel obstruction. Normal appendix without   appendicitis. Small bowel loops are not dilated. Stomach is   underdistended for adequate evaluation.  PERITONEUM: No free air. Small volume scattered ascites..  VESSELS:  Within normal limits.  RETROPERITONEUM: Scattered nonenlarged mesenteric lymph nodes.  ABDOMINAL WALL: Within normal limits.  BONES: Nonspecific sclerotic focus of the right femoral head,   statistically a bone island.    IMPRESSION:    Moderate peripancreatic fatty stranding andedema consistent with acute   interstitial edematous pancreatitis. No pseudocyst formation.    Enlarged fatty liver.    ======================================    ACC: [id number] EXAM:  US ABDOMEN RT UPR QUADRANT                          PROCEDURE DATE:  02/23/2022          INTERPRETATION:  CLINICAL INFORMATION: Elevated bilirubin, pancreatitis    COMPARISON: CT dated 2/20/2022    TECHNIQUE: Sonography of the right upper quadrant.    FINDINGS:    Liver: Increased echogenicity. Enlarged (27.1 cm). Patent hepatic and   portal veins with normal flow direction.  Bile ducts: Normal caliber. Common bile duct measures 5 mm.  Gallbladder: Within normal limits.  Pancreas: Visualized portions are within normal limits.  Right kidney: 13.2 cm. No hydronephrosis.  Ascites: None.  IVC: Visualized portions are within normal limits.    IMPRESSION:    Hepatomegaly and hepatic steatosis.  No cholelithiasis or biliary ductal dilatation.        ASSESSMENT :       PLAN:  HPI:  31yoM with h/o DMon oral medications (unsure about name and dose), presents generalized though possibly slightly epigastric-predominant abdominal pain x 3 days. Worsened with laying and standing, non-food related per se. Patient reports vomiting episode and persistent nausea.  Also notes constipation (last BM yesterday) and low urine output.   Patient also reports unable to pass flatus and had last BM on 1 day ago.   Patient reports he had similar complaints 2 years ago but it was not this bad and did not require hospitalization.    ED Course:  TG 1984, Lipase 4550 , Na 128  ICU Vital Signs Last 24 Hrs  T(C): 36.7 (20 Feb 2022 07:00), Max: 37 (20 Feb 2022 04:18)  T(F): 98 (20 Feb 2022 07:00), Max: 98.6 (20 Feb 2022 04:18)  HR: 88 (20 Feb 2022 07:00) (88 - 94)  BP: 137/82 (20 Feb 2022 07:00) (137/82 - 151/98)  RR: 18 (20 Feb 2022 07:00) (18 - 18)  SpO2: 99% (20 Feb 2022 07:00) (98% - 99%)     (20 Feb 2022 08:38)    # ACUTE PANCREATITIS  # HYPERTRIGLYCERIDEMIA - IMPROVING  # ALCOHOL ABUSE  # DM2, HYPERGLYCEMIA  - GIVEN IVF, S/P INSULIN DRIP, LANTUS SSI +FS ; RECOMMENDED FOR D/C ON METFORMIN AND LOW-DOSE SULFONYLUREA  - ON GEMFIBROZIL  - COUNSELLED PATIENT ON CESSATION > 10 MINS, SW CONSULT FOR RESOURCES  - TOLERATING CLEAR LIQUID DIET, WILL ADVANCE AS TOLERATED  - DIABETIC EDUCATION  - PRN PAIN CONTROL  - ENDOCRINOLOGY CONSULT    # TRANSAMINITIS  # HEPATIC STEATOSIS  - NOTED RUQ U/S  - MONITORING LFTS  - F/U HEPATITIS PANEL    # HYPONATREMIA - RESOLVED  # HYPOKALEMIA - REPLETING WITH SUPPLEMENT  # HYPOPHOSPHATEMIA -RESOLVED    # OBESITY - COUNSELLED ON NUTRITION AND LIFESTYLE CHANGES    # GI AND DVT PPX

## 2022-02-25 ENCOUNTER — TRANSCRIPTION ENCOUNTER (OUTPATIENT)
Age: 32
End: 2022-02-25

## 2022-02-25 DIAGNOSIS — Z29.9 ENCOUNTER FOR PROPHYLACTIC MEASURES, UNSPECIFIED: ICD-10-CM

## 2022-02-25 DIAGNOSIS — Z02.9 ENCOUNTER FOR ADMINISTRATIVE EXAMINATIONS, UNSPECIFIED: ICD-10-CM

## 2022-02-25 DIAGNOSIS — E87.6 HYPOKALEMIA: ICD-10-CM

## 2022-02-25 DIAGNOSIS — E87.1 HYPO-OSMOLALITY AND HYPONATREMIA: ICD-10-CM

## 2022-02-25 DIAGNOSIS — R74.01 ELEVATION OF LEVELS OF LIVER TRANSAMINASE LEVELS: ICD-10-CM

## 2022-02-25 DIAGNOSIS — K85.90 ACUTE PANCREATITIS WITHOUT NECROSIS OR INFECTION, UNSPECIFIED: ICD-10-CM

## 2022-02-25 DIAGNOSIS — E83.39 OTHER DISORDERS OF PHOSPHORUS METABOLISM: ICD-10-CM

## 2022-02-25 LAB
ALBUMIN SERPL ELPH-MCNC: 2.3 G/DL — LOW (ref 3.5–5)
ALP SERPL-CCNC: 140 U/L — HIGH (ref 40–120)
ALT FLD-CCNC: 80 U/L DA — HIGH (ref 10–60)
ANION GAP SERPL CALC-SCNC: 7 MMOL/L — SIGNIFICANT CHANGE UP (ref 5–17)
AST SERPL-CCNC: 62 U/L — HIGH (ref 10–40)
BILIRUB SERPL-MCNC: 1.8 MG/DL — HIGH (ref 0.2–1.2)
BUN SERPL-MCNC: 14 MG/DL — SIGNIFICANT CHANGE UP (ref 7–18)
CALCIUM SERPL-MCNC: 9.2 MG/DL — SIGNIFICANT CHANGE UP (ref 8.4–10.5)
CHLORIDE SERPL-SCNC: 101 MMOL/L — SIGNIFICANT CHANGE UP (ref 96–108)
CO2 SERPL-SCNC: 26 MMOL/L — SIGNIFICANT CHANGE UP (ref 22–31)
CREAT SERPL-MCNC: 0.69 MG/DL — SIGNIFICANT CHANGE UP (ref 0.5–1.3)
GLUCOSE BLDC GLUCOMTR-MCNC: 182 MG/DL — HIGH (ref 70–99)
GLUCOSE BLDC GLUCOMTR-MCNC: 184 MG/DL — HIGH (ref 70–99)
GLUCOSE BLDC GLUCOMTR-MCNC: 219 MG/DL — HIGH (ref 70–99)
GLUCOSE BLDC GLUCOMTR-MCNC: 231 MG/DL — HIGH (ref 70–99)
GLUCOSE SERPL-MCNC: 176 MG/DL — HIGH (ref 70–99)
HAV IGM SER-ACNC: SIGNIFICANT CHANGE UP
HBV CORE IGM SER-ACNC: SIGNIFICANT CHANGE UP
HBV SURFACE AG SER-ACNC: SIGNIFICANT CHANGE UP
HCT VFR BLD CALC: 36.3 % — LOW (ref 39–50)
HCV AB S/CO SERPL IA: 0.07 S/CO — SIGNIFICANT CHANGE UP (ref 0–0.99)
HCV AB SERPL-IMP: SIGNIFICANT CHANGE UP
HGB BLD-MCNC: 12.5 G/DL — LOW (ref 13–17)
MAGNESIUM SERPL-MCNC: 2.5 MG/DL — SIGNIFICANT CHANGE UP (ref 1.6–2.6)
MCHC RBC-ENTMCNC: 28 PG — SIGNIFICANT CHANGE UP (ref 27–34)
MCHC RBC-ENTMCNC: 34.4 GM/DL — SIGNIFICANT CHANGE UP (ref 32–36)
MCV RBC AUTO: 81.4 FL — SIGNIFICANT CHANGE UP (ref 80–100)
NRBC # BLD: 0 /100 WBCS — SIGNIFICANT CHANGE UP (ref 0–0)
PHOSPHATE SERPL-MCNC: 4.5 MG/DL — SIGNIFICANT CHANGE UP (ref 2.5–4.5)
PLATELET # BLD AUTO: 297 K/UL — SIGNIFICANT CHANGE UP (ref 150–400)
POTASSIUM SERPL-MCNC: 3.5 MMOL/L — SIGNIFICANT CHANGE UP (ref 3.5–5.3)
POTASSIUM SERPL-SCNC: 3.5 MMOL/L — SIGNIFICANT CHANGE UP (ref 3.5–5.3)
PROT SERPL-MCNC: 7.3 G/DL — SIGNIFICANT CHANGE UP (ref 6–8.3)
RBC # BLD: 4.46 M/UL — SIGNIFICANT CHANGE UP (ref 4.2–5.8)
RBC # FLD: 13.6 % — SIGNIFICANT CHANGE UP (ref 10.3–14.5)
SODIUM SERPL-SCNC: 134 MMOL/L — LOW (ref 135–145)
WBC # BLD: 11.92 K/UL — HIGH (ref 3.8–10.5)
WBC # FLD AUTO: 11.92 K/UL — HIGH (ref 3.8–10.5)

## 2022-02-25 PROCEDURE — 99254 IP/OBS CNSLTJ NEW/EST MOD 60: CPT

## 2022-02-25 RX ORDER — PANTOPRAZOLE SODIUM 20 MG/1
40 TABLET, DELAYED RELEASE ORAL
Refills: 0 | Status: DISCONTINUED | OUTPATIENT
Start: 2022-02-26 | End: 2022-03-02

## 2022-02-25 RX ORDER — MAGNESIUM HYDROXIDE 400 MG/1
30 TABLET, CHEWABLE ORAL ONCE
Refills: 0 | Status: COMPLETED | OUTPATIENT
Start: 2022-02-25 | End: 2022-02-25

## 2022-02-25 RX ADMIN — Medication 2 UNIT(S): at 12:15

## 2022-02-25 RX ADMIN — Medication 15 MILLIGRAM(S): at 09:10

## 2022-02-25 RX ADMIN — TRAMADOL HYDROCHLORIDE 25 MILLIGRAM(S): 50 TABLET ORAL at 17:00

## 2022-02-25 RX ADMIN — TRAMADOL HYDROCHLORIDE 25 MILLIGRAM(S): 50 TABLET ORAL at 14:24

## 2022-02-25 RX ADMIN — Medication 600 MILLIGRAM(S): at 05:43

## 2022-02-25 RX ADMIN — Medication 2 UNIT(S): at 08:01

## 2022-02-25 RX ADMIN — ENOXAPARIN SODIUM 40 MILLIGRAM(S): 100 INJECTION SUBCUTANEOUS at 12:16

## 2022-02-25 RX ADMIN — Medication 4: at 17:05

## 2022-02-25 RX ADMIN — Medication 600 MILLIGRAM(S): at 17:07

## 2022-02-25 RX ADMIN — Medication 2: at 08:00

## 2022-02-25 RX ADMIN — PANTOPRAZOLE SODIUM 40 MILLIGRAM(S): 20 TABLET, DELAYED RELEASE ORAL at 12:16

## 2022-02-25 RX ADMIN — SENNA PLUS 2 TABLET(S): 8.6 TABLET ORAL at 21:51

## 2022-02-25 RX ADMIN — TRAMADOL HYDROCHLORIDE 25 MILLIGRAM(S): 50 TABLET ORAL at 05:43

## 2022-02-25 RX ADMIN — Medication 15 MILLIGRAM(S): at 02:05

## 2022-02-25 RX ADMIN — Medication 2 UNIT(S): at 17:06

## 2022-02-25 RX ADMIN — Medication 4: at 12:15

## 2022-02-25 RX ADMIN — Medication 15 MILLIGRAM(S): at 19:41

## 2022-02-25 RX ADMIN — Medication 15 MILLIGRAM(S): at 18:09

## 2022-02-25 RX ADMIN — INSULIN GLARGINE 15 UNIT(S): 100 INJECTION, SOLUTION SUBCUTANEOUS at 21:51

## 2022-02-25 RX ADMIN — MAGNESIUM HYDROXIDE 30 MILLILITER(S): 400 TABLET, CHEWABLE ORAL at 19:43

## 2022-02-25 RX ADMIN — Medication 15 MILLIGRAM(S): at 08:08

## 2022-02-25 NOTE — PROGRESS NOTE ADULT - PROBLEM SELECTOR PLAN 1
- Presented with abdominal pain, elevated lipase  - 2/20 CT abdomen showed Moderate peripancreatic fatty stranding and edema consistent with acute interstitial edematous pancreatitis. No pseudocyst formation.  - Believed etiology of acute pacreatitis d/t hypertriglyceridemia and alcoholism   - s/p IVF   - S/p Insulin gtt - Presented with abdominal pain, elevated lipase  - 2/20 CT abdomen showed moderate peripancreatic fatty stranding and edema consistent with acute interstitial edematous pancreatitis. No pseudocyst formation.  - Believed etiology of acute pancreatitis d/t hypertriglyceridemia and alcoholism   - s/p IVF   - S/p Insulin gtt

## 2022-02-25 NOTE — DISCHARGE NOTE PROVIDER - NSDCFUADDAPPT_GEN_ALL_CORE_FT
Follow up at [facility]  [phone] [place] [phone]  WALK-IN for registration and appointment  SLIDING SCALE payment

## 2022-02-25 NOTE — PROGRESS NOTE ADULT - SUBJECTIVE AND OBJECTIVE BOX
NP Note discussed with  primary attending    Patient is a 31y old  Male who presents with a chief complaint of Abdominal pain (25 Feb 2022 14:24)      INTERVAL HPI/OVERNIGHT EVENTS: States to "feel a little better."  Reports abdominal pain relieved by IV pain medication.  Currently abdominal pain, 3/10.  Reports eating a regular diet this AM w/ nausea or emesis.  Reports BM today, small pieces, denies diarrhea.  -Alberto # [id number].    MEDICATIONS  (STANDING):  dextrose 5%. 1000 milliLiter(s) (100 mL/Hr) IV Continuous <Continuous>  enoxaparin Injectable 40 milliGRAM(s) SubCutaneous daily  gemfibrozil 600 milliGRAM(s) Oral every 12 hours  glucagon  Injectable 1 milliGRAM(s) IntraMuscular once  insulin glargine Injectable (LANTUS) 15 Unit(s) SubCutaneous at bedtime  insulin lispro (ADMELOG) corrective regimen sliding scale   SubCutaneous three times a day before meals  insulin lispro Injectable (ADMELOG) 2 Unit(s) SubCutaneous three times a day before meals  senna 2 Tablet(s) Oral at bedtime    MEDICATIONS  (PRN):  ketorolac   Injectable 15 milliGRAM(s) IV Push every 8 hours PRN Moderate Pain (4 - 6)  traMADol 25 milliGRAM(s) Oral every 6 hours PRN Severe Pain (7 - 10)      __________________________________________________  REVIEW OF SYSTEMS:    CONSTITUTIONAL: No fever  EYES: No acute visual disturbances  NECK: No pain or stiffness  RESPIRATORY: No cough; No shortness of breath  CARDIOVASCULAR: No chest pain, no palpitations  GASTROINTESTINAL: (+) Abdominal pain. No nausea or vomiting.  No diarrhea   NEUROLOGICAL: No headache or numbness, no tremors  MUSCULOSKELETAL: No joint pain, no muscle pain  GENITOURINARY: No dysuria, no frequency, no hesitancy  PSYCHIATRY: No depression , no anxiety  ALL OTHER  ROS negative        Vital Signs Last 24 Hrs  T(C): 36.8 (25 Feb 2022 13:09), Max: 37.1 (24 Feb 2022 20:14)  T(F): 98.3 (25 Feb 2022 13:09), Max: 98.8 (24 Feb 2022 20:14)  HR: 83 (25 Feb 2022 13:09) (75 - 83)  BP: 142/78 (25 Feb 2022 13:09) (118/71 - 142/78)  RR: 18 (25 Feb 2022 13:09) (16 - 18)  SpO2: 99% (25 Feb 2022 13:09) (97% - 99%)    ________________________________________________  PHYSICAL EXAM:  GENERAL: NAD  HEENT: Normocephalic;  conjunctivae and sclerae clear; moist mucous membranes;   NECK : Supple  CHEST/LUNG: Clear to auscultation bilaterally with good air entry   HEART: S1 S2  regular; no murmurs, gallops or rubs  ABDOMEN: Obese, Soft, Tender RUQ upon palpation, (+) Distended; No guarding or rebound noted.  Bowel sounds present x 4 quad  EXTREMITIES: No cyanosis; no edema; no calf tenderness  SKIN: Warm and dry; no rash  NERVOUS SYSTEM:  Awake and alert; Oriented  to place, person and time ; no new deficits    _________________________________________________  LABS:                        12.5   11.92 )-----------( 297      ( 25 Feb 2022 06:15 )             36.3     02-25    134<L>  |  101  |  14  ----------------------------<  176<H>  3.5   |  26  |  0.69    Ca    9.2      25 Feb 2022 06:15  Phos  4.5     02-25  Mg     2.5     02-25    TPro  7.3  /  Alb  2.3<L>  /  TBili  1.8<H>  /  DBili  x   /  AST  62<H>  /  ALT  80<H>  /  AlkPhos  140<H>  02-25        CAPILLARY BLOOD GLUCOSE      POCT Blood Glucose.: 219 mg/dL (25 Feb 2022 12:10)  POCT Blood Glucose.: 182 mg/dL (25 Feb 2022 07:38)  POCT Blood Glucose.: 170 mg/dL (24 Feb 2022 21:00)  POCT Blood Glucose.: 175 mg/dL (24 Feb 2022 16:32)        RADIOLOGY & ADDITIONAL TESTS:    Imaging Personally Reviewed:  YES    Consultant(s) Notes Reviewed:   YES    Care Discussed with Consultants :     Plan of care was discussed with patient and /or primary care giver; all questions and concerns were addressed and care was aligned with patient's wishes.

## 2022-02-25 NOTE — DISCHARGE NOTE NURSING/CASE MANAGEMENT/SOCIAL WORK - [facility]SD[facility]ADDAPPT_GEN_ALL_CORE_FT
Jerri Aguilera  [phone] [place] [id number]  [phone]    WALK-IN for registration and appointment  SLIDING SCALE payment    Hours:  Monday-Thursday:  8am – 7pm  Friday & Saturday: 8am – 5pm    Please bring (Por favor ania):    •	Passport/ID (prueba de identificacion)  •	Pay Stubs or letter of financial support (prueba de ingreso o carta de apoyo financiero de la bal)  •	Proof of address (prueba de domicilio)  •	Discharge papers (papeles de julia)

## 2022-02-25 NOTE — CONSULT NOTE ADULT - SUBJECTIVE AND OBJECTIVE BOX
Chief Complaint:  Patient is a 31y old  Male who presents with a chief complaint of Abdominal pain (25 Feb 2022 12:56)      HPI:SUJEY NORRIS is a 31y Male    PMHX/PSHX:  Diabetes      Allergies:  No Known Allergies      Home Medications: reviewed  Hospital Medications:  dextrose 5%. 1000 milliLiter(s) IV Continuous <Continuous>  enoxaparin Injectable 40 milliGRAM(s) SubCutaneous daily  gemfibrozil 600 milliGRAM(s) Oral every 12 hours  glucagon  Injectable 1 milliGRAM(s) IntraMuscular once  insulin glargine Injectable (LANTUS) 15 Unit(s) SubCutaneous at bedtime  insulin lispro (ADMELOG) corrective regimen sliding scale   SubCutaneous three times a day before meals  insulin lispro Injectable (ADMELOG) 2 Unit(s) SubCutaneous three times a day before meals  ketorolac   Injectable 15 milliGRAM(s) IV Push every 8 hours PRN  pantoprazole  Injectable 40 milliGRAM(s) IV Push daily  senna 2 Tablet(s) Oral at bedtime  traMADol 25 milliGRAM(s) Oral every 6 hours PRN      Social History:   Tob: Denies  EtOH: Denies  Illicit Drugs: Denies    Family history:    Denies family history of colon cancer/polyps, stomach cancer/polyps, pancreatic cancer/masses, liver cancer/disease, ovarian cancer and endometrial cancer.    ROS:   General:  No  fevers, chills, night sweats, fatigue  Eyes:  Good vision, no reported pain  ENT:  No sore throat, pain, runny nose  CV:  No pain, palpitations  Pulm:  No dyspnea, cough  GI:  See HPI, otherwise negative  :  No  incontinence, nocturia  Muscle:  No pain, weakness  Neuro:  No memory problems  Psych:  No insomnia, mood problems, depression  Endocrine:  No polyuria, polydipsia, cold/heat intolerance  Heme:  No petechiae, ecchymosis, easy bruisability  Skin:  No rash    PHYSICAL EXAM:   Vital Signs:  Vital Signs Last 24 Hrs  T(C): 36.8 (25 Feb 2022 13:09), Max: 37.1 (24 Feb 2022 20:14)  T(F): 98.3 (25 Feb 2022 13:09), Max: 98.8 (24 Feb 2022 20:14)  HR: 83 (25 Feb 2022 13:09) (75 - 83)  BP: 142/78 (25 Feb 2022 13:09) (118/71 - 142/78)  BP(mean): --  RR: 18 (25 Feb 2022 13:09) (16 - 18)  SpO2: 99% (25 Feb 2022 13:09) (97% - 99%)  Daily     Daily     GENERAL: no acute distress  NEURO: alert, no asterixis  HEENT: anicteric sclera, no conjunctival pallor appreciated  CHEST: no respiratory distress, no accessory muscle use  CARDIAC: regular rate, rhythm  ABDOMEN: soft, non-tender, non-distended, no rebound or guarding  EXTREMITIES: warm, well perfused, no edema  SKIN: no lesions noted    LABS: reviewed                        12.5   11.92 )-----------( 297      ( 25 Feb 2022 06:15 )             36.3     02-25    134<L>  |  101  |  14  ----------------------------<  176<H>  3.5   |  26  |  0.69    Ca    9.2      25 Feb 2022 06:15  Phos  4.5     02-25  Mg     2.5     02-25    TPro  7.3  /  Alb  2.3<L>  /  TBili  1.8<H>  /  DBili  x   /  AST  62<H>  /  ALT  80<H>  /  AlkPhos  140<H>  02-25    LIVER FUNCTIONS - ( 25 Feb 2022 06:15 )  Alb: 2.3 g/dL / Pro: 7.3 g/dL / ALK PHOS: 140 U/L / ALT: 80 U/L DA / AST: 62 U/L / GGT: x               Diagnostic Studies: see sunrise for full report         Chief Complaint:  Patient is a 31y old  Male who presents with a chief complaint of Abdominal pain (25 Feb 2022 12:56)      HPI:  SUJEY NORRIS is a 32yo obese Male with Hx of DM on Janumet presented to [facility] on 2/20/22 with 3 days Hx of epigastric pain, nausea and one episode of NBNB vomiting, and found to have acute pancreatitis with peripancreatic stranding on CT a/p and lipase 4550, with TG 1894. He was admitted to MICU, and required insulin drip. He was transferred to medical floor 2/24/22. RUQ US 2/23/22 showed hepatomegaly and hepatic steatosis, but normal bile ducts and GB. Mild hyperbilirubinemia downtrending (4.5->1.8), and transaminases improved overall too (->59, AST 73->35), but with slight uptick today (ALT 80, AST 62) with slight ALP elevation (140). Hypoalbuminemia. No INR available.  Hepatology was consulted for abnormal liver enzymes.   Patient was seen and examined at bedside. Denies prior liver disease, although reports that was not regularly following with PCP. He is from [place]. Has multiple tattoos (7, 5, 4 years). Reports recent weight gain. Denies drugs. Drinks 12 beers 2-3x a week. Reports that brother had "hepatitis" as a child.   Reports again worsening abdominal pain since diet was advanced. No N/V, no diarrhea.      PMHX/PSHX:  Diabetes      Allergies:  No Known Allergies      Home Medications: reviewed  Hospital Medications:  dextrose 5%. 1000 milliLiter(s) IV Continuous <Continuous>  enoxaparin Injectable 40 milliGRAM(s) SubCutaneous daily  gemfibrozil 600 milliGRAM(s) Oral every 12 hours  glucagon  Injectable 1 milliGRAM(s) IntraMuscular once  insulin glargine Injectable (LANTUS) 15 Unit(s) SubCutaneous at bedtime  insulin lispro (ADMELOG) corrective regimen sliding scale   SubCutaneous three times a day before meals  insulin lispro Injectable (ADMELOG) 2 Unit(s) SubCutaneous three times a day before meals  ketorolac   Injectable 15 milliGRAM(s) IV Push every 8 hours PRN  pantoprazole  Injectable 40 milliGRAM(s) IV Push daily  senna 2 Tablet(s) Oral at bedtime  traMADol 25 milliGRAM(s) Oral every 6 hours PRN      Social History:   EtOH: 12 beers 2-3 x a week  Illicit Drugs: Denies    Family history:    Denies family history of colon cancer/polyps, stomach cancer/polyps, pancreatic cancer/masses, liver cancer/disease, ovarian cancer and endometrial cancer.    ROS:   General:  No  fevers, but chills  Eyes:  Good vision, no reported pain  ENT:  No sore throat, pain, runny nose  CV:  No pain, palpitations  Pulm:  No dyspnea, cough  GI:  No N/V, but reports that epigastric pain worsened since diet was advanced.   :  Reports few days dysuria, but UCx neg on admission  Muscle:  No pain, weakness  Neuro:  No memory problems  Skin:  small rash on RUE    PHYSICAL EXAM:   Vital Signs:  Vital Signs Last 24 Hrs  T(C): 36.8 (25 Feb 2022 13:09), Max: 37.1 (24 Feb 2022 20:14)  T(F): 98.3 (25 Feb 2022 13:09), Max: 98.8 (24 Feb 2022 20:14)  HR: 83 (25 Feb 2022 13:09) (75 - 83)  BP: 142/78 (25 Feb 2022 13:09) (118/71 - 142/78)  BP(mean): --  RR: 18 (25 Feb 2022 13:09) (16 - 18)  SpO2: 99% (25 Feb 2022 13:09) (97% - 99%)  Daily     Daily     GENERAL: no acute distress, obese  NEURO: awake, alert, oriented x3, no asterixis  HEENT: anicteric sclera, no conjunctival pallor appreciated  CHEST: no respiratory distress, no accessory muscle use  CARDIAC: regular rate, rhythm  ABDOMEN: soft, mild epigastric and RUQ tenderness, mildly distended, no rebound or guarding, BS+  EXTREMITIES: warm, well perfused, no edema  SKIN: multiple tattoos, on RUE small erythema (reports that due to BP cuff)    LABS: reviewed                        12.5 11.92 )-----------( 297      ( 25 Feb 2022 06:15 )             36.3     02-25    134<L>  |  101  |  14  ----------------------------<  176<H>  3.5   |  26  |  0.69    Ca    9.2      25 Feb 2022 06:15  Phos  4.5     02-25  Mg     2.5     02-25    TPro  7.3  /  Alb  2.3<L>  /  TBili  1.8<H>  /  DBili  x   /  AST  62<H>  /  ALT  80<H>  /  AlkPhos  140<H>  02-25    LIVER FUNCTIONS - ( 25 Feb 2022 06:15 )  Alb: 2.3 g/dL / Pro: 7.3 g/dL / ALK PHOS: 140 U/L / ALT: 80 U/L DA / AST: 62 U/L / GGT: x               Diagnostic Studies: see sunrise for full report         Chief Complaint:  Patient is a 31y old  Male who presents with a chief complaint of Abdominal pain (25 Feb 2022 12:56)     ID [id number]    HPI:  SUJEY NORRIS is a 32yo obese Male with Hx of DM on Janumet presented to [facility] on 2/20/22 with 3 days Hx of epigastric pain, nausea and one episode of NBNB vomiting, and found to have acute pancreatitis with peripancreatic stranding on CT a/p and lipase 4550, with TG 1894. He was admitted to MICU, and required insulin drip. He was transferred to medical floor 2/24/22. RUQ US 2/23/22 showed hepatomegaly and hepatic steatosis, but normal bile ducts and GB. Mild hyperbilirubinemia downtrending (4.5->1.8), and transaminases improved overall too (->59, AST 73->35), but with slight uptick today (ALT 80, AST 62) with slight ALP elevation (140). Hypoalbuminemia. No INR available.  Hepatology was consulted for abnormal liver enzymes.   Patient was seen and examined at bedside. Denies prior liver disease, although reports that was not regularly following with PCP. He is from [place]. Has multiple tattoos (7, 5, 4 years). Reports recent weight gain. Denies drugs. Drinks 12 beers 2-3x a week. Reports that brother had "hepatitis" as a child.   Reports again worsening abdominal pain since diet was advanced. No N/V, no diarrhea.      PMHX/PSHX:  Diabetes      Allergies:  No Known Allergies      Home Medications: reviewed  Hospital Medications:  dextrose 5%. 1000 milliLiter(s) IV Continuous <Continuous>  enoxaparin Injectable 40 milliGRAM(s) SubCutaneous daily  gemfibrozil 600 milliGRAM(s) Oral every 12 hours  glucagon  Injectable 1 milliGRAM(s) IntraMuscular once  insulin glargine Injectable (LANTUS) 15 Unit(s) SubCutaneous at bedtime  insulin lispro (ADMELOG) corrective regimen sliding scale   SubCutaneous three times a day before meals  insulin lispro Injectable (ADMELOG) 2 Unit(s) SubCutaneous three times a day before meals  ketorolac   Injectable 15 milliGRAM(s) IV Push every 8 hours PRN  pantoprazole  Injectable 40 milliGRAM(s) IV Push daily  senna 2 Tablet(s) Oral at bedtime  traMADol 25 milliGRAM(s) Oral every 6 hours PRN      Social History:   EtOH: 12 beers 2-3 x a week  Illicit Drugs: Denies    Family history:    Denies family history of colon cancer/polyps, stomach cancer/polyps, pancreatic cancer/masses, liver cancer/disease, ovarian cancer and endometrial cancer.    ROS:   General:  No  fevers, but chills  Eyes:  Good vision, no reported pain  ENT:  No sore throat, pain, runny nose  CV:  No pain, palpitations  Pulm:  No dyspnea, cough  GI:  No N/V, but reports that epigastric pain worsened since diet was advanced.   :  Reports few days dysuria, but UCx neg on admission  Muscle:  No pain, weakness  Neuro:  No memory problems  Skin:  small rash on RUE    PHYSICAL EXAM:   Vital Signs:  Vital Signs Last 24 Hrs  T(C): 36.8 (25 Feb 2022 13:09), Max: 37.1 (24 Feb 2022 20:14)  T(F): 98.3 (25 Feb 2022 13:09), Max: 98.8 (24 Feb 2022 20:14)  HR: 83 (25 Feb 2022 13:09) (75 - 83)  BP: 142/78 (25 Feb 2022 13:09) (118/71 - 142/78)  BP(mean): --  RR: 18 (25 Feb 2022 13:09) (16 - 18)  SpO2: 99% (25 Feb 2022 13:09) (97% - 99%)  Daily     Daily     GENERAL: no acute distress, obese  NEURO: awake, alert, oriented x3, no asterixis  HEENT: anicteric sclera, no conjunctival pallor appreciated  CHEST: no respiratory distress, no accessory muscle use  CARDIAC: regular rate, rhythm  ABDOMEN: soft, mild epigastric and RUQ tenderness, mildly distended, no rebound or guarding, BS+  EXTREMITIES: warm, well perfused, no edema  SKIN: multiple tattoos, on RUE small erythema (reports that due to BP cuff)    LABS: reviewed                        12.5   11.92 )-----------( 297      ( 25 Feb 2022 06:15 )             36.3     02-25    134<L>  |  101  |  14  ----------------------------<  176<H>  3.5   |  26  |  0.69    Ca    9.2      25 Feb 2022 06:15  Phos  4.5     02-25  Mg     2.5     02-25    TPro  7.3  /  Alb  2.3<L>  /  TBili  1.8<H>  /  DBili  x   /  AST  62<H>  /  ALT  80<H>  /  AlkPhos  140<H>  02-25    LIVER FUNCTIONS - ( 25 Feb 2022 06:15 )  Alb: 2.3 g/dL / Pro: 7.3 g/dL / ALK PHOS: 140 U/L / ALT: 80 U/L DA / AST: 62 U/L / GGT: x               Diagnostic Studies: see sunrise for full report

## 2022-02-25 NOTE — PROGRESS NOTE ADULT - SUBJECTIVE AND OBJECTIVE BOX
Interval Events:  pt in nad  eating well    Allergies    No Known Allergies    Intolerances      Endocrine/Metabolic Medications:  gemfibrozil 600 milliGRAM(s) Oral every 12 hours  glucagon  Injectable 1 milliGRAM(s) IntraMuscular once  insulin glargine Injectable (LANTUS) 15 Unit(s) SubCutaneous at bedtime  insulin lispro (ADMELOG) corrective regimen sliding scale   SubCutaneous three times a day before meals  insulin lispro Injectable (ADMELOG) 2 Unit(s) SubCutaneous three times a day before meals      Vital Signs Last 24 Hrs  T(C): 36.7 (25 Feb 2022 05:19), Max: 37.1 (24 Feb 2022 20:14)  T(F): 98.1 (25 Feb 2022 05:19), Max: 98.8 (24 Feb 2022 20:14)  HR: 75 (25 Feb 2022 05:19) (75 - 79)  BP: 118/71 (25 Feb 2022 05:19) (118/71 - 136/83)  BP(mean): --  RR: 18 (25 Feb 2022 05:19) (16 - 18)  SpO2: 98% (25 Feb 2022 05:19) (97% - 99%)      PHYSICAL EXAM  All physical exam findings normal, except those marked:  General:	Alert, active, cooperative, NAD, well hydrated  .		[] Abnormal:  Neck		Normal: supple, no cervical adenopathy, no palpable thyroid  .		[] Abnormal:  Cardiovascular	Normal: regular rate, normal S1, S2, no murmurs  .		[] Abnormal:  Respiratory	Normal: no chest wall deformity, normal respiratory pattern, CTA B/L  .		[] Abnormal:  Abdominal	Normal: soft, ND, NT, bowel sounds present, no masses, no organomegaly  .		[] Abnormal:  		Normal normal genitalia, testes descended, circumcised/uncircumcised  .		Madelaine stage:			Breast madelaine:  .		Menstrual history:  .		[] Abnormal:  Extremities	Normal: FROM x4  .		[] Abnormal:  Skin		Normal: intact and not indurated, no rash, no acanthosis nigricans  .		[] Abnormal:  Neurologic	Normal: grossly intact  .		[] Abnormal:    LABS                        12.5   11.92 )-----------( 297      ( 25 Feb 2022 06:15 )             36.3                               134    |  101    |  14                  Calcium: 9.2   / iCa: x      (02-25 @ 06:15)    ----------------------------<  176       Magnesium: 2.5                              3.5     |  26     |  0.69             Phosphorous: 4.5      TPro  7.3    /  Alb  2.3    /  TBili  1.8    /  DBili  x      /  AST  62     /  ALT  80     /  AlkPhos  140    25 Feb 2022 06:15    CAPILLARY BLOOD GLUCOSE      POCT Blood Glucose.: 219 mg/dL (25 Feb 2022 12:10)  POCT Blood Glucose.: 182 mg/dL (25 Feb 2022 07:38)  POCT Blood Glucose.: 170 mg/dL (24 Feb 2022 21:00)  POCT Blood Glucose.: 175 mg/dL (24 Feb 2022 16:32)        Assesment/plan    31yoM with h/o DMon oral medications (unsure about name and dose), presents generalized though possibly slightly epigastric-predominant abdominal pain x 3 days.  Found to have hypertriglyceridemia / pancreatitis and uncont dm . Pt takes janumet as out pt . No fsg monitoring . last MD visit about 8 mths ago.      Problem/Recommendation - 1:  ·  Problem: Uncontrolled diabetes mellitus.   ·  Recommendation: cont current insulin tx  d/c janumet -out pt med due to pancreatitis  may d/c insulin and start metformin 1000mg bid and amaryl 2mg qd  upon d/c  fsg ac and hs  a1c- 9.5.     Problem/Recommendation - 2:  ·  Problem: Hypertriglyceridemia.   ·  Recommendation: due to etoh  s/p iv insulin drip  cont lopid.

## 2022-02-25 NOTE — CONSULT NOTE ADULT - SUBJECTIVE AND OBJECTIVE BOX
Time of visit:    CHIEF COMPLAINT: Patient is a 31y old  Male who presents with a chief complaint of Abdominal pain (25 Feb 2022 14:41)      HPI:  31yoM with h/o DMon oral medications (unsure about name and dose), presents generalized though possibly slightly epigastric-predominant abdominal pain x 3 days. Worsened with laying and standing, non-food related per se. Patient reports vomiting episode and persistent nausea.  Also notes constipation (last BM yesterday) and low urine output.   Patient also reports unable to pass flatus and had last BM on 1 day ago.   Patient reports he had similar complaints 2 years ago but it was not this bad and did not require hospitalization.    ED Course:  TG 1984, Lipase 4550 , Na 128  ICU Vital Signs Last 24 Hrs  T(C): 36.7 (20 Feb 2022 07:00), Max: 37 (20 Feb 2022 04:18)  T(F): 98 (20 Feb 2022 07:00), Max: 98.6 (20 Feb 2022 04:18)  HR: 88 (20 Feb 2022 07:00) (88 - 94)  BP: 137/82 (20 Feb 2022 07:00) (137/82 - 151/98)  RR: 18 (20 Feb 2022 07:00) (18 - 18)  SpO2: 99% (20 Feb 2022 07:00) (98% - 99%)     (20 Feb 2022 08:38)   Patient seen and examined.     PAST MEDICAL & SURGICAL HISTORY:  Diabetes        Allergies    No Known Allergies    Intolerances        MEDICATIONS  (STANDING):  dextrose 5%. 1000 milliLiter(s) (100 mL/Hr) IV Continuous <Continuous>  enoxaparin Injectable 40 milliGRAM(s) SubCutaneous daily  gemfibrozil 600 milliGRAM(s) Oral every 12 hours  glucagon  Injectable 1 milliGRAM(s) IntraMuscular once  insulin glargine Injectable (LANTUS) 15 Unit(s) SubCutaneous at bedtime  insulin lispro (ADMELOG) corrective regimen sliding scale   SubCutaneous three times a day before meals  insulin lispro Injectable (ADMELOG) 2 Unit(s) SubCutaneous three times a day before meals  magnesium hydroxide Suspension 30 milliLiter(s) Oral once  senna 2 Tablet(s) Oral at bedtime      MEDICATIONS  (PRN):  ketorolac   Injectable 15 milliGRAM(s) IV Push every 8 hours PRN Moderate Pain (4 - 6)  traMADol 25 milliGRAM(s) Oral every 6 hours PRN Severe Pain (7 - 10)   Medications up to date at time of exam.    Medications up to date at time of exam.    FAMILY HISTORY:      SOCIAL HISTORY  Smoking History: [   ] smoking/smoke exposure, [   ] former smoker  Living Condition: [   ] apartment, [   ] private house  Work History:   Travel History: denies recent travel  Illicit Substance Use: denies  Alcohol Use: denies    REVIEW OF SYSTEMS:    CONSTITUTIONAL:  denies fevers, chills, sweats, weight loss    HEENT:  denies diplopia or blurred vision, sore throat or runny nose.    CARDIOVASCULAR:  denies pressure, squeezing, tightness, or heaviness about the chest; no palpitations.    RESPIRATORY:  denies SOB, cough, DANIELSON, wheezing.    GASTROINTESTINAL:  denies abdominal pain, nausea, vomiting or diarrhea.    GENITOURINARY: denies dysuria, frequency or urgency.    NEUROLOGIC:  denies numbness, tingling, seizures or weakness.    PSYCHIATRIC:  denies disorder of thought or mood.    MSK: denies swelling, redness      PHYSICAL EXAMINATION:    GENERAL: The patient is a well-developed, well-nourished, in no apparent distress.     Vital Signs Last 24 Hrs  T(C): 36.8 (25 Feb 2022 13:09), Max: 37.1 (24 Feb 2022 20:14)  T(F): 98.3 (25 Feb 2022 13:09), Max: 98.8 (24 Feb 2022 20:14)  HR: 83 (25 Feb 2022 13:09) (75 - 83)  BP: 142/78 (25 Feb 2022 13:09) (118/71 - 142/78)  BP(mean): --  RR: 18 (25 Feb 2022 13:09) (16 - 18)  SpO2: 99% (25 Feb 2022 13:09) (97% - 99%)   (if applicable)    Chest Tube (if applicable)    HEENT: Head is normocephalic and atraumatic. Extraocular muscles are intact. Mucous membranes are moist.     NECK: Supple, no palpable adenopathy.    LUNGS: Clear to auscultation, no wheezing, rales, or rhonchi.    HEART: Regular rate and rhythm without murmur.    ABDOMEN: Soft, nontender, and nondistended.  No hepatosplenomegaly is noted.    RENAL: No difficulty voiding, no pelvic pain    EXTREMITIES: Without any cyanosis, clubbing, rash, lesions or edema.    NEUROLOGIC: Awake, alert, oriented, grossly intact    SKIN: Warm, dry, good turgor.      LABS:                        12.5   11.92 )-----------( 297      ( 25 Feb 2022 06:15 )             36.3     02-25    134<L>  |  101  |  14  ----------------------------<  176<H>  3.5   |  26  |  0.69    Ca    9.2      25 Feb 2022 06:15  Phos  4.5     02-25  Mg     2.5     02-25    TPro  7.3  /  Alb  2.3<L>  /  TBili  1.8<H>  /  DBili  x   /  AST  62<H>  /  ALT  80<H>  /  AlkPhos  140<H>  02-25                        MICROBIOLOGY: (if applicable)    RADIOLOGY & ADDITIONAL STUDIES:  EKG:   US of RUQ: < from: US Abdomen Upper Quadrant Right (02.23.22 @ 12:26) >    ACC: [id number] EXAM:  US ABDOMEN RT UPR QUADRANT                          PROCEDURE DATE:  02/23/2022          INTERPRETATION:  CLINICAL INFORMATION: Elevated bilirubin, pancreatitis    COMPARISON: CT dated 2/20/2022    TECHNIQUE: Sonography of the right upper quadrant.    FINDINGS:    Liver: Increased echogenicity. Enlarged (27.1 cm). Patent hepatic and   portal veins with normal flow direction.  Bile ducts: Normal caliber. Common bile duct measures 5 mm.  Gallbladder: Within normal limits.  Pancreas: Visualized portions are within normal limits.  Right kidney: 13.2 cm. No hydronephrosis.  Ascites: None.  IVC: Visualized portions are within normal limits.    IMPRESSION:    Hepatomegaly and hepatic steatosis.  No cholelithiasis or biliary ductal dilatation.        --- End of Report ---            SERGIO DETN MD; Attending Radiologist  This document has been electronically signed. Feb 23 2022 12:53PM    < end of copied text >    ECHO:    IMPRESSION: 31y Male PAST MEDICAL & SURGICAL HISTORY:  Diabetes     p/w           IMP: This is  a 31 yr old obese  man  DM on Janumet, presented w/ generalized epigastric-predominant abdominal pain x 3 days. Worsened with laying and standing, non-food related per se. Patient reports vomiting episode and persistent nausea. Patient reports he had similar complaints 2 years ago but it was not this bad and did not require hospitalization. In the ED pt was found afebrile, HR 88, /82, Sat 99% on RA. Labs on admission significant for Na 128,  TG 1894, Lipase 4550. CT A/P showed moderate peripancreatic stranding and edema c/w pancreatitis. In the ED pt received 2L NS bolus. Pt was admitted to ICU for pancreatitis requiring insulin drip.       While in the ICU pt was initially NPO, pain controlled with dilaudid initially, then switched to tramadol and toradol. While on insulin drip + D5 1/2 NS.  BG was monitored q 1-2 hrs, remained wnl. Lipase and TGC down trending. Once pt reached triglycerides of 647 he was switched to lantus 10 U + SSI moderate scale. Also was started on Gemfibrozil 600 mg q 12 hrs. Given elevated BG levels readjusted regimen to lantus 15U + Admelog 2U + SSI (mod).  He was started on clear liquid diet later switched to regular low fat diet (which he did not tolerate well d/t pain), back on clear liquid diet tolerating well so far. Endocrinology Dr. Reyes consulted, recommended that pt should not continue janumet on discharge, could be managed with  metformin and low dose sulfonylurea upon d/c. Due to  elevated Tbil, RUQ was ordered, which showed hepatomegalia w/ hepatic steatosis. It did not show evidence of cholelythiasis or biliary duct dilation.     Plan   -Continue diet as tolerated   - Pain control   - Monitor blood sugar with coverage   - Continue Lopid   - OOB to chair

## 2022-02-25 NOTE — CHART NOTE - NSCHARTNOTEFT_GEN_A_CORE
EVENT: Received telephone call from RN that pt is c/o of severe abdominal pain.    HPI:30yo M with h/o DM on Janumet, presented w/ generalized epigastric-predominant abdominal pain x 3 days. Worsened with laying and standing, non-food related per se. Patient reports vomiting episode and persistent nausea. Patient reports he had similar complaints 2 years ago but it was not this bad and did not require hospitalization. In the ED pt was found afebrile, HR 88, /82, Sat 99% on RA. Labs on admission significant for Na 128,  TG 1894, Lipase 4550. CT A/P showed moderate peripancreatic stranding and edema c/w pancreatitis. In the ED pt received 2L NS bolus.  Admitted to ICU for pancreatitis requiring insulin drip.  Downgraded to 4S on 2/24/22.     Subjective: "My pain is a 10/10. Please help me" I've been passing gas & having BMs    OBJECTIVE:  Vital Signs Last 24 Hrs  T(C): 36.4 (27 Feb 2022 19:44), Max: 36.8 (27 Feb 2022 05:00)  T(F): 97.6 (27 Feb 2022 19:44), Max: 98.3 (27 Feb 2022 05:00)  HR: 77 (27 Feb 2022 19:44) (77 - 89)  BP: 133/81 (27 Feb 2022 19:44) (128/79 - 139/79)  BP(mean): --  RR: 18 (27 Feb 2022 19:44) (18 - 19)  SpO2: 100% (27 Feb 2022 19:44) (97% - 100%)    FOCUSED PHYSICAL EXAM:  Neuro: awake, alert, oriented x 3. No neuro deficit  Cardiovascular: Pulses +2 B/L in lower and upper extremities, HR regular, BP stable, No edema.  Respiratory: Respirations regular, unlabored, breath sounds clear B/L.   GI: Abdomen soft, non-tender, positive bowel sounds.  : no bladder distention noted. No complaints at this time.  Skin: Dry, intact, no bruising, no diaphoresis.    LABS:                        12.8   11.22 )-----------( 361      ( 27 Feb 2022 15:09 )             38.2     02-27    135  |  103  |  17  ----------------------------<  190<H>  3.7   |  24  |  0.78    Ca    9.0      27 Feb 2022 15:09  Phos  4.3     02-26  Mg     2.4     02-26    TPro  7.7  /  Alb  2.6<L>  /  TBili  1.3<H>  /  DBili  x   /  AST  76<H>  /  ALT  111<H>  /  AlkPhos  164<H>  02-26        ASSESSMENT/PROBLEM: Abdominal pain 2/2 to pancreatitis???      PLAN:   1. Toradol 15mg, IVP x 1 dose ordered  2. Monitor response to treatment  3. Cont present acre/treatment  4. Supportive care

## 2022-02-25 NOTE — PROGRESS NOTE ADULT - SUBJECTIVE AND OBJECTIVE BOX
`Patient is a 31y old  Male who presents with a chief complaint of Abdominal pain (25 Feb 2022 06:40)    PATIENT IS SEEN AND EXAMINED IN MEDICAL FLOOR.  NGT [    ]    MONA [   ]      GT [   ]    ALLERGIES:  No Known Allergies      Daily     Daily     VITALS:    Vital Signs Last 24 Hrs  T(C): 36.7 (25 Feb 2022 05:19), Max: 37.1 (24 Feb 2022 20:14)  T(F): 98.1 (25 Feb 2022 05:19), Max: 98.8 (24 Feb 2022 20:14)  HR: 75 (25 Feb 2022 05:19) (75 - 79)  BP: 118/71 (25 Feb 2022 05:19) (118/71 - 136/83)  BP(mean): --  RR: 18 (25 Feb 2022 05:19) (16 - 18)  SpO2: 98% (25 Feb 2022 05:19) (97% - 99%)    LABS:    CBC Full  -  ( 25 Feb 2022 06:15 )  WBC Count : 11.92 K/uL  RBC Count : 4.46 M/uL  Hemoglobin : 12.5 g/dL  Hematocrit : 36.3 %  Platelet Count - Automated : 297 K/uL  Mean Cell Volume : 81.4 fl  Mean Cell Hemoglobin : 28.0 pg  Mean Cell Hemoglobin Concentration : 34.4 gm/dL  Auto Neutrophil # : x  Auto Lymphocyte # : x  Auto Monocyte # : x  Auto Eosinophil # : x  Auto Basophil # : x  Auto Neutrophil % : x  Auto Lymphocyte % : x  Auto Monocyte % : x  Auto Eosinophil % : x  Auto Basophil % : x      02-25    134<L>  |  101  |  14  ----------------------------<  176<H>  3.5   |  26  |  0.69    Ca    9.2      25 Feb 2022 06:15  Phos  4.5     02-25  Mg     2.5     02-25    TPro  7.3  /  Alb  2.3<L>  /  TBili  1.8<H>  /  DBili  x   /  AST  62<H>  /  ALT  80<H>  /  AlkPhos  140<H>  02-25    CAPILLARY BLOOD GLUCOSE      POCT Blood Glucose.: 182 mg/dL (25 Feb 2022 07:38)  POCT Blood Glucose.: 170 mg/dL (24 Feb 2022 21:00)  POCT Blood Glucose.: 175 mg/dL (24 Feb 2022 16:32)  POCT Blood Glucose.: 202 mg/dL (24 Feb 2022 11:30)        LIVER FUNCTIONS - ( 25 Feb 2022 06:15 )  Alb: 2.3 g/dL / Pro: 7.3 g/dL / ALK PHOS: 140 U/L / ALT: 80 U/L DA / AST: 62 U/L / GGT: x           Creatinine Trend: 0.69<--, 0.52<--, 0.54<--, 0.68<--, 0.72<--, 0.74<--  I&O's Summary    24 Feb 2022 07:01  -  25 Feb 2022 07:00  --------------------------------------------------------  IN: 400 mL / OUT: 379 mL / NET: 21 mL            .Blood Blood-Peripheral  02-21 @ 06:27   No growth to date.  --  --      Clean Catch Clean Catch (Midstream)  02-20 @ 12:33   No growth  --  --          MEDICATIONS:    MEDICATIONS  (STANDING):  dextrose 5%. 1000 milliLiter(s) (100 mL/Hr) IV Continuous <Continuous>  enoxaparin Injectable 40 milliGRAM(s) SubCutaneous daily  gemfibrozil 600 milliGRAM(s) Oral every 12 hours  glucagon  Injectable 1 milliGRAM(s) IntraMuscular once  insulin glargine Injectable (LANTUS) 15 Unit(s) SubCutaneous at bedtime  insulin lispro (ADMELOG) corrective regimen sliding scale   SubCutaneous three times a day before meals  insulin lispro Injectable (ADMELOG) 2 Unit(s) SubCutaneous three times a day before meals  pantoprazole  Injectable 40 milliGRAM(s) IV Push daily  senna 2 Tablet(s) Oral at bedtime      MEDICATIONS  (PRN):  ketorolac   Injectable 15 milliGRAM(s) IV Push every 8 hours PRN Moderate Pain (4 - 6)  traMADol 25 milliGRAM(s) Oral every 6 hours PRN Severe Pain (7 - 10)      REVIEW OF SYSTEMS:                           ALL ROS DONE [ X   ]    CONSTITUTIONAL:  LETHARGIC [   ], FEVER [   ], UNRESPONSIVE [   ]  CVS:  CP  [   ], SOB, [   ], PALPITATIONS [   ], DIZZYNESS [   ]  RS: COUGH [   ], SPUTUM [   ]  GI: ABDOMINAL PAIN [   ], NAUSEA [   ], VOMITINGS [   ], DIARRHEA [   ], CONSTIPATION [   ]  :  DYSURIA [   ], NOCTURIA [   ], INCREASED FREQUENCY [   ], DRIBLING [   ],  SKELETAL: PAINFUL JOINTS [   ], SWOLLEN JOINTS [   ], NECK ACHE [   ], LOW BACK ACHE [   ],  SKIN : ULCERS [   ], RASH [   ], ITCHING [   ]  CNS: HEAD ACHE [   ], DOUBLE VISION [   ], BLURRED VISION [   ], AMS / CONFUSION [   ], SEIZURES [   ], WEAKNESS [   ],TINGLING / NUMBNESS [   ]    PHYSICAL EXAMINATION:  GENERAL APPEARANCE: NO DISTRESS  HEENT:  NO PALLOR, NO  JVD,  NO   NODES, NECK SUPPLE  CVS: S1 +, S2 +,   RS: AEEB,  OCCASIONAL  RALES +,   NO RONCHI  ABD: SOFT, NO, BS + , MILD EPIGASTRIC TENDERNESS  EXT: NO PE  SKIN: WARM,   SKELETAL:  ROM ACCEPTABLE  CNS:  AAO X 3, NO   DEFICITS    RADIOLOGY :    ACC: [id number] EXAM:  CT ABDOMEN AND PELVIS IC                          PROCEDURE DATE:  02/20/2022          INTERPRETATION:  CLINICAL INFORMATION: Abdominal pain.    COMPARISON: None.    CONTRAST/COMPLICATIONS:  IV Contrast: Omnipaque 350  90 cc administered   10 cc discarded  Oral Contrast: NONE  Complications: None reported at time of study completion      PROCEDURE:  Axial CT images were acquired through the abdomen and pelvis following   intravenous contrast. Coronal and sagittal reformatted images provided..    FINDINGS:  LOWER CHEST: Visualized lung bases demonstrate mild dependent change.  LIVER: Diffuse hepatic steatosis. Enlarged right hepatic lobe.  BILE DUCTS: Normal caliber.  GALLBLADDER: No calcified gallstone.  SPLEEN: Within normal limits.  PANCREAS: There is a moderate peripancreatic fatty stranding and edema   consistent with acute interstitial edematous pancreatitis. There is no   pseudocyst formation.  ADRENALS: Within normal limits.  KIDNEYS/URETERS: Enhance symmetrically without hydronephrosis..    BLADDER: Within normal limits.  REPRODUCTIVE ORGANS: Prostate gland within normal limits. Seminal   vesicles symmetric.    BOWEL: Evaluation of bowel is limited without distention with oral   contrast, however there is no bowel obstruction. Normal appendix without   appendicitis. Small bowel loops are not dilated. Stomach is   underdistended for adequate evaluation.  PERITONEUM: No free air. Small volume scattered ascites..  VESSELS:  Within normal limits.  RETROPERITONEUM: Scattered nonenlarged mesenteric lymph nodes.  ABDOMINAL WALL: Within normal limits.  BONES: Nonspecific sclerotic focus of the right femoral head,   statistically a bone island.    IMPRESSION:    Moderate peripancreatic fatty stranding andedema consistent with acute   interstitial edematous pancreatitis. No pseudocyst formation.    Enlarged fatty liver.    ======================================    ACC: [id number] EXAM:  US ABDOMEN RT UPR QUADRANT                          PROCEDURE DATE:  02/23/2022          INTERPRETATION:  CLINICAL INFORMATION: Elevated bilirubin, pancreatitis    COMPARISON: CT dated 2/20/2022    TECHNIQUE: Sonography of the right upper quadrant.    FINDINGS:    Liver: Increased echogenicity. Enlarged (27.1 cm). Patent hepatic and   portal veins with normal flow direction.  Bile ducts: Normal caliber. Common bile duct measures 5 mm.  Gallbladder: Within normal limits.  Pancreas: Visualized portions are within normal limits.  Right kidney: 13.2 cm. No hydronephrosis.  Ascites: None.  IVC: Visualized portions are within normal limits.    IMPRESSION:    Hepatomegaly and hepatic steatosis.  No cholelithiasis or biliary ductal dilatation.        ASSESSMENT :       PLAN:  HPI:  31yoM with h/o DMon oral medications (unsure about name and dose), presents generalized though possibly slightly epigastric-predominant abdominal pain x 3 days. Worsened with laying and standing, non-food related per se. Patient reports vomiting episode and persistent nausea.  Also notes constipation (last BM yesterday) and low urine output.   Patient also reports unable to pass flatus and had last BM on 1 day ago.   Patient reports he had similar complaints 2 years ago but it was not this bad and did not require hospitalization.    ED Course:  TG 1984, Lipase 4550 , Na 128  ICU Vital Signs Last 24 Hrs  T(C): 36.7 (20 Feb 2022 07:00), Max: 37 (20 Feb 2022 04:18)  T(F): 98 (20 Feb 2022 07:00), Max: 98.6 (20 Feb 2022 04:18)  HR: 88 (20 Feb 2022 07:00) (88 - 94)  BP: 137/82 (20 Feb 2022 07:00) (137/82 - 151/98)  RR: 18 (20 Feb 2022 07:00) (18 - 18)  SpO2: 99% (20 Feb 2022 07:00) (98% - 99%)     (20 Feb 2022 08:38)    # ACUTE PANCREATITIS  # HYPERTRIGLYCERIDEMIA - IMPROVING  # ALCOHOL ABUSE  # DM2, HYPERGLYCEMIA  - GIVEN IVF, S/P INSULIN DRIP, LANTUS SSI +FS ; RECOMMENDED FOR D/C ON METFORMIN AND LOW-DOSE SULFONYLUREA  - ON GEMFIBROZIL  - COUNSELLED PATIENT ON CESSATION > 10 MINS, SW CONSULT FOR RESOURCES  - TOLERATING CLEAR LIQUID DIET, WILL ADVANCE AS TOLERATED  - DIABETIC EDUCATION  - PRN PAIN CONTROL  - ENDOCRINOLOGY CONSULT    # TRANSAMINITIS  # HEPATIC STEATOSIS  - NOTED RUQ U/S  - MONITORING LFTS  - F/U HEPATITIS PANEL    # HYPONATREMIA - RESOLVED  # HYPOKALEMIA - REPLETING WITH SUPPLEMENT  # HYPOPHOSPHATEMIA -RESOLVED    # OBESITY - COUNSELLED ON NUTRITION AND LIFESTYLE CHANGES    # GI AND DVT PPX     Patient is a 31y old  Male who presents with a chief complaint of Abdominal pain (25 Feb 2022 06:40)    PATIENT IS SEEN AND EXAMINED IN MEDICAL FLOOR.    ALLERGIES:  No Known Allergies    VITALS:    Vital Signs Last 24 Hrs  T(C): 36.7 (25 Feb 2022 05:19), Max: 37.1 (24 Feb 2022 20:14)  T(F): 98.1 (25 Feb 2022 05:19), Max: 98.8 (24 Feb 2022 20:14)  HR: 75 (25 Feb 2022 05:19) (75 - 79)  BP: 118/71 (25 Feb 2022 05:19) (118/71 - 136/83)  BP(mean): --  RR: 18 (25 Feb 2022 05:19) (16 - 18)  SpO2: 98% (25 Feb 2022 05:19) (97% - 99%)    LABS:    CBC Full  -  ( 25 Feb 2022 06:15 )  WBC Count : 11.92 K/uL  RBC Count : 4.46 M/uL  Hemoglobin : 12.5 g/dL  Hematocrit : 36.3 %  Platelet Count - Automated : 297 K/uL  Mean Cell Volume : 81.4 fl  Mean Cell Hemoglobin : 28.0 pg  Mean Cell Hemoglobin Concentration : 34.4 gm/dL  Auto Neutrophil # : x  Auto Lymphocyte # : x  Auto Monocyte # : x  Auto Eosinophil # : x  Auto Basophil # : x  Auto Neutrophil % : x  Auto Lymphocyte % : x  Auto Monocyte % : x  Auto Eosinophil % : x  Auto Basophil % : x      02-25    134<L>  |  101  |  14  ----------------------------<  176<H>  3.5   |  26  |  0.69    Ca    9.2      25 Feb 2022 06:15  Phos  4.5     02-25  Mg     2.5     02-25    TPro  7.3  /  Alb  2.3<L>  /  TBili  1.8<H>  /  DBili  x   /  AST  62<H>  /  ALT  80<H>  /  AlkPhos  140<H>  02-25    CAPILLARY BLOOD GLUCOSE      POCT Blood Glucose.: 182 mg/dL (25 Feb 2022 07:38)  POCT Blood Glucose.: 170 mg/dL (24 Feb 2022 21:00)  POCT Blood Glucose.: 175 mg/dL (24 Feb 2022 16:32)  POCT Blood Glucose.: 202 mg/dL (24 Feb 2022 11:30)        LIVER FUNCTIONS - ( 25 Feb 2022 06:15 )  Alb: 2.3 g/dL / Pro: 7.3 g/dL / ALK PHOS: 140 U/L / ALT: 80 U/L DA / AST: 62 U/L / GGT: x           Creatinine Trend: 0.69<--, 0.52<--, 0.54<--, 0.68<--, 0.72<--, 0.74<--  I&O's Summary    24 Feb 2022 07:01  -  25 Feb 2022 07:00  --------------------------------------------------------  IN: 400 mL / OUT: 379 mL / NET: 21 mL            .Blood Blood-Peripheral  02-21 @ 06:27   No growth to date.  --  --      Clean Catch Clean Catch (Midstream)  02-20 @ 12:33   No growth  --  --          MEDICATIONS:    MEDICATIONS  (STANDING):  dextrose 5%. 1000 milliLiter(s) (100 mL/Hr) IV Continuous <Continuous>  enoxaparin Injectable 40 milliGRAM(s) SubCutaneous daily  gemfibrozil 600 milliGRAM(s) Oral every 12 hours  glucagon  Injectable 1 milliGRAM(s) IntraMuscular once  insulin glargine Injectable (LANTUS) 15 Unit(s) SubCutaneous at bedtime  insulin lispro (ADMELOG) corrective regimen sliding scale   SubCutaneous three times a day before meals  insulin lispro Injectable (ADMELOG) 2 Unit(s) SubCutaneous three times a day before meals  pantoprazole  Injectable 40 milliGRAM(s) IV Push daily  senna 2 Tablet(s) Oral at bedtime      MEDICATIONS  (PRN):  ketorolac   Injectable 15 milliGRAM(s) IV Push every 8 hours PRN Moderate Pain (4 - 6)  traMADol 25 milliGRAM(s) Oral every 6 hours PRN Severe Pain (7 - 10)      REVIEW OF SYSTEMS:                           ALL ROS DONE [ X   ]    CONSTITUTIONAL:  LETHARGIC [   ], FEVER [   ], UNRESPONSIVE [   ]  CVS:  CP  [   ], SOB, [   ], PALPITATIONS [   ], DIZZYNESS [   ]  RS: COUGH [   ], SPUTUM [   ]  GI: ABDOMINAL PAIN [   ], NAUSEA [   ], VOMITINGS [   ], DIARRHEA [   ], CONSTIPATION [   ]  :  DYSURIA [   ], NOCTURIA [   ], INCREASED FREQUENCY [   ], DRIBLING [   ],  SKELETAL: PAINFUL JOINTS [   ], SWOLLEN JOINTS [   ], NECK ACHE [   ], LOW BACK ACHE [   ],  SKIN : ULCERS [   ], RASH [   ], ITCHING [   ]  CNS: HEAD ACHE [   ], DOUBLE VISION [   ], BLURRED VISION [   ], AMS / CONFUSION [   ], SEIZURES [   ], WEAKNESS [   ],TINGLING / NUMBNESS [   ]    PHYSICAL EXAMINATION:  GENERAL APPEARANCE: NO DISTRESS  HEENT:  NO PALLOR, NO  JVD,  NO   NODES, NECK SUPPLE  CVS: S1 +, S2 +,   RS: AEEB,  OCCASIONAL  RALES +,   NO RONCHI  ABD: SOFT, NO, BS + , MILD EPIGASTRIC TENDERNESS  EXT: NO PE  SKIN: WARM,   SKELETAL:  ROM ACCEPTABLE  CNS:  AAO X 3, NO   DEFICITS    RADIOLOGY :    ACC: [id number] EXAM:  CT ABDOMEN AND PELVIS IC                          PROCEDURE DATE:  02/20/2022          INTERPRETATION:  CLINICAL INFORMATION: Abdominal pain.    COMPARISON: None.    CONTRAST/COMPLICATIONS:  IV Contrast: Omnipaque 350  90 cc administered   10 cc discarded  Oral Contrast: NONE  Complications: None reported at time of study completion      PROCEDURE:  Axial CT images were acquired through the abdomen and pelvis following   intravenous contrast. Coronal and sagittal reformatted images provided..    FINDINGS:  LOWER CHEST: Visualized lung bases demonstrate mild dependent change.  LIVER: Diffuse hepatic steatosis. Enlarged right hepatic lobe.  BILE DUCTS: Normal caliber.  GALLBLADDER: No calcified gallstone.  SPLEEN: Within normal limits.  PANCREAS: There is a moderate peripancreatic fatty stranding and edema   consistent with acute interstitial edematous pancreatitis. There is no   pseudocyst formation.  ADRENALS: Within normal limits.  KIDNEYS/URETERS: Enhance symmetrically without hydronephrosis..    BLADDER: Within normal limits.  REPRODUCTIVE ORGANS: Prostate gland within normal limits. Seminal   vesicles symmetric.    BOWEL: Evaluation of bowel is limited without distention with oral   contrast, however there is no bowel obstruction. Normal appendix without   appendicitis. Small bowel loops are not dilated. Stomach is   underdistended for adequate evaluation.  PERITONEUM: No free air. Small volume scattered ascites..  VESSELS:  Within normal limits.  RETROPERITONEUM: Scattered nonenlarged mesenteric lymph nodes.  ABDOMINAL WALL: Within normal limits.  BONES: Nonspecific sclerotic focus of the right femoral head,   statistically a bone island.    IMPRESSION:    Moderate peripancreatic fatty stranding andedema consistent with acute   interstitial edematous pancreatitis. No pseudocyst formation.    Enlarged fatty liver.    ======================================    ACC: [id number] EXAM:  US ABDOMEN RT UPR QUADRANT                          PROCEDURE DATE:  02/23/2022          INTERPRETATION:  CLINICAL INFORMATION: Elevated bilirubin, pancreatitis    COMPARISON: CT dated 2/20/2022    TECHNIQUE: Sonography of the right upper quadrant.    FINDINGS:    Liver: Increased echogenicity. Enlarged (27.1 cm). Patent hepatic and   portal veins with normal flow direction.  Bile ducts: Normal caliber. Common bile duct measures 5 mm.  Gallbladder: Within normal limits.  Pancreas: Visualized portions are within normal limits.  Right kidney: 13.2 cm. No hydronephrosis.  Ascites: None.  IVC: Visualized portions are within normal limits.    IMPRESSION:    Hepatomegaly and hepatic steatosis.  No cholelithiasis or biliary ductal dilatation.        ASSESSMENT :       PLAN:  HPI:  31yoM with h/o DMon oral medications (unsure about name and dose), presents generalized though possibly slightly epigastric-predominant abdominal pain x 3 days. Worsened with laying and standing, non-food related per se. Patient reports vomiting episode and persistent nausea.  Also notes constipation (last BM yesterday) and low urine output.   Patient also reports unable to pass flatus and had last BM on 1 day ago.   Patient reports he had similar complaints 2 years ago but it was not this bad and did not require hospitalization.    ED Course:  TG 1984, Lipase 4550 , Na 128  ICU Vital Signs Last 24 Hrs  T(C): 36.7 (20 Feb 2022 07:00), Max: 37 (20 Feb 2022 04:18)  T(F): 98 (20 Feb 2022 07:00), Max: 98.6 (20 Feb 2022 04:18)  HR: 88 (20 Feb 2022 07:00) (88 - 94)  BP: 137/82 (20 Feb 2022 07:00) (137/82 - 151/98)  RR: 18 (20 Feb 2022 07:00) (18 - 18)  SpO2: 99% (20 Feb 2022 07:00) (98% - 99%)     (20 Feb 2022 08:38)    # ACUTE PANCREATITIS  # HYPERTRIGLYCERIDEMIA - IMPROVING  # ALCOHOL ABUSE  # DM2, HYPERGLYCEMIA  - GIVEN IVF, S/P INSULIN DRIP, LANTUS SSI +FS ; RECOMMENDED FOR D/C ON METFORMIN AND LOW-DOSE SULFONYLUREA  - ON GEMFIBROZIL  - COUNSELLED PATIENT ON CESSATION > 10 MINS, SW CONSULT FOR RESOURCES  - TOLERATING CLEAR LIQUID DIET, WILL ADVANCE AS TOLERATED  - DIABETIC EDUCATION  - PRN PAIN CONTROL  - ENDOCRINOLOGY CONSULT    - PATIENT DID NOT TOLERATE SOFT DIET [2/25] - SWITCHED BACK TO FULL LIQUID    # TRANSAMINITIS  # HEPATIC STEATOSIS  - NOTED RUQ U/S  - MONITORING LFTS  - F/U HEPATITIS PANEL  - HEPATOLOGY CONSULT    # HYPONATREMIA - RESOLVED  # HYPOKALEMIA - REPLETING WITH SUPPLEMENT  # HYPOPHOSPHATEMIA -RESOLVED    # OBESITY - COUNSELLED ON NUTRITION AND LIFESTYLE CHANGES    # GI AND DVT PPX

## 2022-02-25 NOTE — DISCHARGE NOTE NURSING/CASE MANAGEMENT/SOCIAL WORK - PATIENT PORTAL LINK FT
You can access the FollowMyHealth Patient Portal offered by [facility] by registering at the following website: [email or web address]. By joining [facility]’s FollowMyHealth portal, you will also be able to view your health information using other applications (apps) compatible with our system.

## 2022-02-25 NOTE — DISCHARGE NOTE PROVIDER - NSDCMRMEDTOKEN_GEN_ALL_CORE_FT
hydroxychloroquine 200 mg oral tablet: 1 tab(s) orally 2 times a day   Janumet 50 mg-1000 mg oral tablet: 1 tab(s) orally 2 times a day   Amaryl 2 mg oral tablet: 1 tab(s) orally once a day  gemfibrozil 600 mg oral tablet: 1 tab(s) orally every 12 hours  metFORMIN 1000 mg oral tablet: 1 tab(s) orally 2 times a day   Protonix 40 mg oral delayed release tablet: 1 tab(s) orally once a day (before a meal)

## 2022-02-25 NOTE — DISCHARGE NOTE PROVIDER - CARE PROVIDER_API CALL
Marcelino Arreguin)  Gastroenterology; Internal Medicine  [address] [facility] [address] [place] [address]  [place] [address]  Phone: [phone]  Fax: [phone]  Follow Up Time: 1 week   Marcelino Arreguin)  Gastroenterology; Internal Medicine  [address] [facility] [address] [place] [address]  [place] [address]  Phone: [phone]  Fax: [phone]  Follow Up Time: 1 week    Elizabeth Gonzalez)  Internal Medicine  [address] [place] [address]  Phone: [phone]  Fax: [phone]  Follow Up Time: 1 week

## 2022-02-25 NOTE — DISCHARGE NOTE PROVIDER - PROVIDER TOKENS
PROVIDER:[TOKEN:[[id number]],FOLLOWUP:[1 week]] PROVIDER:[TOKEN:[[id number]],FOLLOWUP:[1 week]],PROVIDER:[TOKEN:[[id number]],FOLLOWUP:[1 week]]

## 2022-02-25 NOTE — SBIRT NOTE ADULT - NSSBIRTALCACTION/INTER_GEN_A_CORE
Addended by: CLAUDETTE GILMORE on: 1/22/2021 10:59 AM     Modules accepted: Orders     Brief intervention

## 2022-02-25 NOTE — DISCHARGE NOTE PROVIDER - NSFOLLOWUPCLINICSTOKEN_GEN_ALL_ED_FT
505076:1 week|| ||00\01||False;852244:1 week|| ||00\01||False; 236153:1 week|| ||00\01||False;263503:1 week|| ||00\01||False;520366:2 weeks|| ||00\01||False; 945073:2 weeks|| ||00\01||False;694702:1 week|| ||00\01||False;

## 2022-02-25 NOTE — DISCHARGE NOTE PROVIDER - NSFOLLOWUPCLINICS_GEN_ALL_ED_FT
[facility]  Gastroenterology  [address] [place] [address]  Phone: [phone]  Fax: [phone]  Follow Up Time: 1 week    [facility]  Internal Medicine  [address] [place] [address]  Phone: [phone]  Fax: [phone]  Follow Up Time: 1 week     [facility]  Gastroenterology  [address] [place] [address]  Phone: [phone]  Fax: [phone]  Follow Up Time: 1 week    [facility]  Internal Medicine  [address] [place] [address]  Phone: [phone]  Fax: [phone]  Follow Up Time: 1 week    [facility]  Endocrinology  [address] [place] [address]  Phone: [phone]  Fax: [phone]  Follow Up Time: 2 weeks     [facility]  Endocrinology  [address] [place] [address]  Phone: [phone]  Fax: [phone]  Follow Up Time: 2 weeks    [facility]  Internal Medicine  [address] [place] [address]  Phone: [phone]  Fax: [phone]  Follow Up Time: 1 week

## 2022-02-25 NOTE — DISCHARGE NOTE PROVIDER - NSDCCPCAREPLAN_GEN_ALL_CORE_FT
PRINCIPAL DISCHARGE DIAGNOSIS  Diagnosis: Acute pancreatitis  Assessment and Plan of Treatment:       SECONDARY DISCHARGE DIAGNOSES  Diagnosis: Hyperbilirubinemia  Assessment and Plan of Treatment:     Diagnosis: Uncontrolled diabetes mellitus  Assessment and Plan of Treatment: HgA1C this admission.  Make sure you get your HgA1c checked every three months.  If you take oral diabetes medications, check your blood glucose two times a day.  If you take insulin, check your blood glucose before meals and at bedtime.  It's important not to skip any meals.  Keep a log of your blood glucose results and always take it with you to your doctor appointments.  Keep a list of your current medications including injectables and over the counter medications and bring this medication list with you to all your doctor appointments.  If you have not seen your ophthalmologist this year call for appointment.  Check your feet daily for redness, sores, or openings. Do not self treat. If no improvement in two days call your primary care physician for an appointment.  Low blood sugar (hypoglycemia) is a blood sugar below 70mg/dl. Check your blood sugar if you feel signs/symptoms of hypoglycemia. If your blood sugar is below 70 take 15 grams of carbohydrates (ex 4 oz of apple juice, 3-4 glucose tablets, or 4-6 oz of regular soda) wait 15 minutes and repeat blood sugar to make sure it comes up above 70.  If your blood sugar is above 70 and you are due for a meal, have a meal.  If you are not due for a meal have a snack.  This snack helps keeps your blood sugar at a safe range.      Diagnosis: Electrolyte imbalance  Assessment and Plan of Treatment:      PRINCIPAL DISCHARGE DIAGNOSIS  Diagnosis: Acute pancreatitis  Assessment and Plan of Treatment: Admitted to ICU for acute pancreatitis likely due to elevated triglyceride in blood and Alcohol use. CT abdomenshowed moderate peripancreatic fatty stranding and edema consistent with acute interstitial edematous pancreatitis. No pseudocyst formation.  You were followed by ICU team and Gastroenterologist. You were started on IV fluids, pain medications and bowel rest. your symptoms improved and you were started on oral food which you were able to tolerate.   You underwent MRCP for abdominal tenderness and resulted ---Peripancreatic edema, compatible with acute pancreatitis. No evidence for pancreatic necrosis. 2.0 x 8.0 cm fluid collection adjacent to the pancreas inferiorly, likely representing acute peripancreatic fluid collection. T1 hyperintensity within this fluid collection, suggestive of hemorrhagic or proteinaceous content. The common bile duct measures up to 5 mm in caliber which is within normal limits. No evidence for choledocholithiasis. No evidence for cholelithiasis. Splenomegaly.  Small ascites. Mildly enlarged peripancreatic lymph node.--  GI recommending outpatient follow up.   If your pain starts again, you should start off with drinking only clear liquids, such as soup broth or gelatin. You will need to follow this diet until your symptoms get better. Slowly add other foods back to your diet when you are better.  -Eat a healthy diet that is low in fat  -Eat foods that are high in protein and carbohydrates, but low in fat. -Eat smaller meals, and eat more often.   -stop drinking alcohol   Managing Pain:   Avoiding alcohol and foods that make your symptoms worse is the first step to controlling pain.  -Use acetaminophen (Tylenol) or nonsteroidal anti-inflammatory drugs, such as ibuprofen (Advil, Motrin), at first to try and control your pain.  -seek medical attention if your symptoms do not improve or worsen on current treatment  Follow up with PCP and Gastroenterologist dr. Arreguin within 1 week. Call for an appointment.         SECONDARY DISCHARGE DIAGNOSES  Diagnosis: Hyperbilirubinemia  Assessment and Plan of Treatment: Your liver enzymes and bilirubin level were elevated in the setting of Pancreatitis and ETOH use. You were followed by Hepatologist. Abdominal Ultrasound shows evidence of hepatic steatosis (fatty liver), Hepatomegaly. No evidence of biliary ductal tube dilation or cholecystitis. Follow up with outpatient GI clinic in 1-2 weeks. continue low fat diet.    Diagnosis: Hypertriglyceridemia  Assessment and Plan of Treatment: in the setitng with acute pancreatitis and ETOH use. continue low fat diet. Continue Lopid as prescribed. Follow up with PCP after discharge. call for an appointment.       Diagnosis: Uncontrolled diabetes mellitus  Assessment and Plan of Treatment: HgA1C 9.5 this admission.  Make sure you get your HgA1c checked every three months.   Do not take Janumet at home.   continue Metformin 1000mg twice a day and Amaryl  2mg once a day as recommended by endocrine doctor.   Follow up with your Primary MD after discharge.   It's important not to skip any meals.  Keep a log of your blood glucose results and always take it with you to your doctor appointments.  If you have not seen your ophthalmologist this year call for appointment.  Check your feet daily for redness, sores, or openings. Do not self treat. If no improvement in two days call your primary care physician for an appointment.  Low blood sugar (hypoglycemia) is a blood sugar below 70mg/dl. Check your blood sugar if you feel signs/symptoms of hypoglycemia. If your blood sugar is below 70 take 15 grams of carbohydrates (ex 4 oz of apple juice, 3-4 glucose tablets, or 4-6 oz of regular soda) wait 15 minutes and repeat blood sugar to make sure it comes up above 70.  If your blood sugar is above 70 and you are due for a meal, have a meal.  If you are not due for a meal have a snack.  This snack helps keeps your blood sugar at a safe range.      Diagnosis: Electrolyte imbalance  Assessment and Plan of Treatment: you were treated for abnormal electrolytes in your blood- Low potassium, low sodium, and low phosphorus level in the setting with decreased oral intake due to acute pancreatitis and ETOH. Now improving to normla ranges. Follow up with your Primary MD to monitor blood work after discharge.    Diagnosis: History of ETOH abuse  Assessment and Plan of Treatment: You have a history of alcohol dependence or alcoholism. With alcohol dependence, you drink alcohol too much and too often for a longer period of time. Dependence symptoms include needing more alcohol to get the same effects, having symptoms of withdrawal if you stop drinking, and may be unable to control your drinking. Alcohol abuse is dangerous to your liver and you may experience alcohol withdrawal if you stop drinking suddenly. Common symptoms of alcohol withdrawal include shaking, throwing up, sweating, and anxiety. Please follow up closely with your PCP to be properly monitored and treated. Please seek care immediately  if you have a convulsion, trouble breathing, chest pain, fast heartbeat, and feel like hurting yourself or someone else.     PRINCIPAL DISCHARGE DIAGNOSIS  Diagnosis: Acute pancreatitis  Assessment and Plan of Treatment: Admitted to ICU for acute pancreatitis likely due to elevated triglyceride in blood and Alcohol use. CT abdomenshowed moderate peripancreatic fatty stranding and edema consistent with acute interstitial edematous pancreatitis. No pseudocyst formation.  You were followed by ICU team and Gastroenterologist. You were started on IV fluids, pain medications and bowel rest. your symptoms improved and you were started on oral food which you were able to tolerate.   You underwent MRCP for abdominal tenderness and resulted ---Peripancreatic edema, compatible with acute pancreatitis. No evidence for pancreatic necrosis. 2.0 x 8.0 cm fluid collection adjacent to the pancreas inferiorly, likely representing acute peripancreatic fluid collection. T1 hyperintensity within this fluid collection, suggestive of hemorrhagic or proteinaceous content. The common bile duct measures up to 5 mm in caliber which is within normal limits. No evidence for choledocholithiasis. No evidence for cholelithiasis. Splenomegaly.  Small ascites. Mildly enlarged peripancreatic lymph node.--  GI recommending outpatient follow up.   If your pain starts again, you should start off with drinking only clear liquids, such as soup broth or gelatin. You will need to follow this diet until your symptoms get better. Slowly add other foods back to your diet when you are better.  -Eat a healthy diet that is low in fat  -Eat foods that are high in protein and carbohydrates, but low in fat. -Eat smaller meals, and eat more often.   -stop drinking alcohol   Managing Pain:   Avoiding alcohol and foods that make your symptoms worse is the first step to controlling pain.  -Use acetaminophen (Tylenol) or nonsteroidal anti-inflammatory drugs, such as ibuprofen (Advil, Motrin), at first to try and control your pain.  -seek medical attention if your symptoms do not improve or worsen on current treatment  Follow up with PCP and Gastroenterologist dr. Arreguin within 1 week. Call for an appointment.         SECONDARY DISCHARGE DIAGNOSES  Diagnosis: Hepatic steatosis  Assessment and Plan of Treatment: Abdominal Ultrasound shows evidence of hepatic steatosis (fatty liver), Hepatomegaly. No evidence of biliary ductal tube dilation or cholecystitis.  Your liver enzymes and bilirubin level were elevated in the setting of Pancreatitis and ETOH use. You were followed by Hepatologist.  Follow up with outpatient GI clinic in 1-2 weeks. continue low fat diet.    Diagnosis: Hyperbilirubinemia  Assessment and Plan of Treatment: Your liver enzymes and bilirubin level were elevated in the setting of Pancreatitis and ETOH use. You were followed by Hepatologist. Abdominal Ultrasound shows evidence of hepatic steatosis (fatty liver), Hepatomegaly. No evidence of biliary ductal tube dilation or cholecystitis. Follow up with outpatient GI clinic in 1-2 weeks. continue low fat diet.    Diagnosis: Hypertriglyceridemia  Assessment and Plan of Treatment: in the setitng with acute pancreatitis and ETOH use. continue low fat diet. Continue Lopid as prescribed. Follow up with PCP after discharge. call for an appointment.       Diagnosis: Uncontrolled diabetes mellitus  Assessment and Plan of Treatment: HgA1C 9.5 this admission.  Make sure you get your HgA1c checked every three months.   Do not take Janumet at home.   continue Metformin 1000mg twice a day and Amaryl  2mg once a day as recommended by endocrine doctor.   Follow up with your Primary MD after discharge.   It's important not to skip any meals.  Keep a log of your blood glucose results and always take it with you to your doctor appointments.  If you have not seen your ophthalmologist this year call for appointment.  Check your feet daily for redness, sores, or openings. Do not self treat. If no improvement in two days call your primary care physician for an appointment.  Low blood sugar (hypoglycemia) is a blood sugar below 70mg/dl. Check your blood sugar if you feel signs/symptoms of hypoglycemia. If your blood sugar is below 70 take 15 grams of carbohydrates (ex 4 oz of apple juice, 3-4 glucose tablets, or 4-6 oz of regular soda) wait 15 minutes and repeat blood sugar to make sure it comes up above 70.  If your blood sugar is above 70 and you are due for a meal, have a meal.  If you are not due for a meal have a snack.  This snack helps keeps your blood sugar at a safe range.      Diagnosis: Electrolyte imbalance  Assessment and Plan of Treatment: you were treated for abnormal electrolytes in your blood- Low potassium, low sodium, and low phosphorus level in the setting with decreased oral intake due to acute pancreatitis and ETOH. Now improving to normla ranges. Follow up with your Primary MD to monitor blood work after discharge.    Diagnosis: History of ETOH abuse  Assessment and Plan of Treatment: You have a history of alcohol dependence or alcoholism. With alcohol dependence, you drink alcohol too much and too often for a longer period of time. Dependence symptoms include needing more alcohol to get the same effects, having symptoms of withdrawal if you stop drinking, and may be unable to control your drinking. Alcohol abuse is dangerous to your liver and you may experience alcohol withdrawal if you stop drinking suddenly. Common symptoms of alcohol withdrawal include shaking, throwing up, sweating, and anxiety. Please follow up closely with your PCP to be properly monitored and treated. Please seek care immediately  if you have a convulsion, trouble breathing, chest pain, fast heartbeat, and feel like hurting yourself or someone else.     PRINCIPAL DISCHARGE DIAGNOSIS  Diagnosis: Acute pancreatitis  Assessment and Plan of Treatment: Admitted to ICU for acute pancreatitis likely due to elevated triglyceride in blood and Alcohol use. CT abdomenshowed moderate peripancreatic fatty stranding and edema consistent with acute interstitial edematous pancreatitis. No pseudocyst formation.  You were followed by ICU team and Gastroenterologist. You were started on IV fluids, pain medications and bowel rest. your symptoms improved and you were started on oral food which you were able to tolerate.   You underwent MRCP for abdominal tenderness and resulted ---Peripancreatic edema, compatible with acute pancreatitis. No evidence for pancreatic necrosis. 2.0 x 8.0 cm fluid collection adjacent to the pancreas inferiorly, likely representing acute peripancreatic fluid collection. T1 hyperintensity within this fluid collection, suggestive of hemorrhagic or proteinaceous content. The common bile duct measures up to 5 mm in caliber which is within normal limits. No evidence for choledocholithiasis. No evidence for cholelithiasis. Splenomegaly.  Small ascites. Mildly enlarged peripancreatic lymph node.--  GI recommending outpatient follow up.   If your pain starts again, you should start off with drinking only clear liquids, such as soup broth or gelatin. You will need to follow this diet until your symptoms get better. Slowly add other foods back to your diet when you are better.  -Eat a healthy diet that is low in fat  -Eat foods that are high in protein and carbohydrates, but low in fat. -Eat smaller meals, and eat more often.   -stop drinking alcohol   Managing Pain:   Avoiding alcohol and foods that make your symptoms worse is the first step to controlling pain.  -Use acetaminophen (Tylenol) or nonsteroidal anti-inflammatory drugs, such as ibuprofen (Advil, Motrin), at first to try and control your pain.  -seek medical attention if your symptoms do not improve or worsen on current treatment  Follow up with PCP and Gastroenterologist dr. Arreguin within 1 week. Call for an appointment.         SECONDARY DISCHARGE DIAGNOSES  Diagnosis: Hepatic steatosis  Assessment and Plan of Treatment: Abdominal Ultrasound shows evidence of hepatic steatosis (fatty liver), Hepatomegaly. No evidence of biliary ductal tube dilation or cholecystitis.  Your liver enzymes and bilirubin level were elevated in the setting of Pancreatitis and ETOH use. You were followed by Hepatologist.  Follow up with outpatient GI clinic dr Arreguin and hepatology dr. Breaux 1-2 weeks. continue low fat diet.    Diagnosis: Hyperbilirubinemia  Assessment and Plan of Treatment: Your liver enzymes and bilirubin level were elevated in the setting of Pancreatitis and ETOH use. You were followed by Hepatologist. Abdominal Ultrasound shows evidence of hepatic steatosis (fatty liver), Hepatomegaly. No evidence of biliary ductal tube dilation or cholecystitis. Follow up with outpatient hepatology dr. Gonzalez in 1-2 weeks. continue low fat diet.    Diagnosis: Hypertriglyceridemia  Assessment and Plan of Treatment: in the setitng with acute pancreatitis and ETOH use. continue low fat diet. Continue Lopid as prescribed. Follow up with PCP after discharge. call for an appointment.       Diagnosis: Uncontrolled diabetes mellitus  Assessment and Plan of Treatment: HgA1C 9.5 this admission.  Make sure you get your HgA1c checked every three months.   Do not take Janumet at home.   continue Metformin 1000mg twice a day and Amaryl  2mg once a day as recommended by endocrine doctor.   Follow up with your Primary MD after discharge.   It's important not to skip any meals.  Keep a log of your blood glucose results and always take it with you to your doctor appointments.  If you have not seen your ophthalmologist this year call for appointment.  Check your feet daily for redness, sores, or openings. Do not self treat. If no improvement in two days call your primary care physician for an appointment.  Low blood sugar (hypoglycemia) is a blood sugar below 70mg/dl. Check your blood sugar if you feel signs/symptoms of hypoglycemia. If your blood sugar is below 70 take 15 grams of carbohydrates (ex 4 oz of apple juice, 3-4 glucose tablets, or 4-6 oz of regular soda) wait 15 minutes and repeat blood sugar to make sure it comes up above 70.  If your blood sugar is above 70 and you are due for a meal, have a meal.  If you are not due for a meal have a snack.  This snack helps keeps your blood sugar at a safe range.      Diagnosis: Electrolyte imbalance  Assessment and Plan of Treatment: you were treated for abnormal electrolytes in your blood- Low potassium, low sodium, and low phosphorus level in the setting with decreased oral intake due to acute pancreatitis and ETOH. Now improving to normla ranges. Follow up with your Primary MD to monitor blood work after discharge.    Diagnosis: History of ETOH abuse  Assessment and Plan of Treatment: You have a history of alcohol dependence or alcoholism. With alcohol dependence, you drink alcohol too much and too often for a longer period of time. Dependence symptoms include needing more alcohol to get the same effects, having symptoms of withdrawal if you stop drinking, and may be unable to control your drinking. Alcohol abuse is dangerous to your liver and you may experience alcohol withdrawal if you stop drinking suddenly. Common symptoms of alcohol withdrawal include shaking, throwing up, sweating, and anxiety. Please follow up closely with your PCP to be properly monitored and treated. Please seek care immediately  if you have a convulsion, trouble breathing, chest pain, fast heartbeat, and feel like hurting yourself or someone else.

## 2022-02-25 NOTE — DISCHARGE NOTE NURSING/CASE MANAGEMENT/SOCIAL WORK - NSDCPEFALRISK_GEN_ALL_CORE
For information on Fall & Injury Prevention, visit: [email or web address] OR  [email or web address] OR  https://www.cdc.gov/steadi/patient.html

## 2022-02-25 NOTE — CONSULT NOTE ADULT - ASSESSMENT
30yo obese Male with Hx of DM on Janumet presented to [facility] of [facility] on 2/20/22 with 3 days Hx of epigastric pain, nausea and one episode of NBNB vomiting, and found to have acute pancreatitis with peripancreatic stranding on CT a/p and lipase 4550, with TG 1894, requiring ICU admission and insulin drip. He was transferred to medical floor 2/24/22. RUQ US 2/23/22 showed hepatomegaly and hepatic steatosis, but normal bile ducts and GB. Mild hyperbilirubinemia downtrending (4.5->1.8), and transaminases improved overall too (->59, AST 73->35), but with slight uptick today (ALT 80, AST 62) with slight ALP elevation (140). Hypoalbuminemia. No INR available.      Acute pancreatitis - improved, but c/o worsening epigastric pain since diet was advanced  - C/w mgmt. per primary team    Mild hyperbilirubinemia  Mild transaminase and ALP elevation  - RUQ US showing hepatomegaly and hepatic steatosis, obese, diabetic; No biliary pathology on US abd  - Fractionate bilirubin  - On gemfibrozil, but it was just started, unlikely to be responsible  - Discussed importance of alcohol abstinence, diet, exercise, weight loss and compliance with DM treatment (HbA1c 9.5)  - F/u viral hepatitis panel  - C/w monitoring, obtain INR  - Can have elastography outpatient      Thank you for consult  Will continue to follow. Hepatology returns Monday. Please, contact GI on call if change in status, questions, concerns.  D/w primary team 30yo obese Male with Hx of DM on Janumet presented to [facility] on 2/20/22 with 3 days Hx of epigastric pain, nausea and one episode of NBNB vomiting, and found to have acute pancreatitis with peripancreatic stranding on CT a/p and lipase 4550, with TG 1894, requiring ICU admission and insulin drip. He was transferred to medical floor 2/24/22. RUQ US 2/23/22 showed hepatomegaly and hepatic steatosis, but normal bile ducts and GB. Mild hyperbilirubinemia downtrending (4.5->1.8), and transaminases improved overall too (->59, AST 73->35), but with slight uptick today (ALT 80, AST 62) with slight ALP elevation (140). Hypoalbuminemia. No INR available.      Acute pancreatitis - improved, but c/o worsening epigastric pain since diet was advanced  - C/w mgmt. per primary team    Mild hyperbilirubinemia  Mild transaminase and ALP elevation  - Improved with improvement of pancreatitis  - RUQ US showing hepatomegaly and hepatic steatosis, obese, diabetic; No biliary pathology on US abd  - Fractionate bilirubin  - On gemfibrozil, but it was just started, unlikely to be responsible  - Discussed importance of alcohol abstinence, diet, exercise, weight loss and compliance with DM treatment (HbA1c 9.5)  - F/u viral hepatitis panel  - C/w monitoring, obtain INR  - Can have elastography outpatient      Thank you for consult  Will continue to follow. Hepatology returns Monday. Please, contact GI on call if change in status, questions, concerns.  D/w primary team

## 2022-02-25 NOTE — DISCHARGE NOTE PROVIDER - HOSPITAL COURSE
31 yoM with h/o DM on Janumet, presented w/ generalized epigastric-predominant abdominal pain x 3 days. Worsened with laying and standing, non-food related per se. Patient reports vomiting episode and persistent nausea. Patient reports he had similar complaints 2 years ago but it was not this bad and did not require hospitalization. In the ED pt was found afebrile, HR 88, /82, Sat 99% on RA. Labs on admission significant for Na 128,  TG 1894, Lipase 4550. CT A/P showed moderate peripancreatic stranding and edema c/w pancreatitis. In the ED pt received 2L NS bolus.     Pt was admitted to ICU for pancreatitis requiring insulin drip.        A 30 y/o Male with hx of DM on Janumet, presented w/ generalized epigastric-predominant abdominal pain x 3 days. Worsened with laying and standing, non-food related per se.   Patient reports vomiting episode and persistent nausea.   In the ED pt was found afebrile, HR 88, /82, Sat 99% on RA. Labs on admission significant for Hyponatremia (Na 128),  TG 1894, Lipase 4550. CT Abd/pelvis showed moderate peripancreatic stranding and edema c/w pancreatitis.  pt received 2L NS bolus in ED.   Patient is admitted to ICU for pancreatitis requiring insulin drip.   ICU course;  While in the ICU pt was initially NPO then started regular, pain controlled with Dilaudid, then switched to Tramadol and Toradol. While on insulin drip + D5 1/2 NS.  BG was monitored closely and remained wnl. Lipase and TGC down trending. Once pt reached triglycerides of 647 he was switched to lantus 10 U + SSI moderate scale. Also was started on Gemfibrozil 600 mg q 12 hrs. Given elevated BG levels readjusted regimen to lantus 15U + Admelog 2U + SSI (mod).  He was started on clear liquid diet later switched to regular low fat diet (which he did not tolerate well d/t pain), back on clear liquid diet tolerating well so far. Endocrinology Dr. Reyes consulted, recommended that pt should not continue janumet on discharge, could be managed with  metformin and low dose sulfonylurea upon d/c. Due to  elevated Tbil, RUQ was ordered, which showed hepatomegaly  w/ hepatic steatosis. It did not show evidence of cholelythiasis or biliary duct dilation.     Given pt no longer requiring insulin drip he is stable to be DG to medicine floor 2/24/22.     2/28-Pt. completed Abx therapy with Zosyn, remains afebrile, abdomen  with Lipase trending up to 2166 today.  GI  consulted, MRCP ordered per reccs.    3/1-Abdomen mildly tender today, tolerating diet well, Lipase stable at 2084.  Awaiting MRCP, will f/u results.      Problem/Plan - 1:  ·  Problem: Acute pancreatitis.   ·  Plan: - Presented with abdominal pain, elevated lipase  - 2/20 CT abdomen showed moderate peripancreatic fatty stranding and edema consistent with acute interstitial edematous pancreatitis. No pseudocyst formation.  - Believed etiology of acute pancreatitis d/t hypertriglyceridemia and alcoholism   - s/p IVF   - S/p Insulin gtt  - s/p Zosyn course  - Lipase trending back up to 2166 ->2088  - GI Dr. Arreguin consulted  - Awaiting MRCP to R/O peripancreatic collection  - WBC trending up 11.58->14.34, afebrile.    Problem/Plan - 2:  ·  Problem: Hypertriglyceridemia.   ·  Plan: likely due to ETOH-Improving  - s/p ICU Insulin gtt mgnt  - f/u am trig.    Problem/Plan - 3:  ·  Problem: Uncontrolled diabetes mellitus.   ·  Plan: A1C 9.5  FS stable  Continue to monitor FS  DC Plan: DC Janumet.  Start metformin 1000mg bid and Amaryl 2mg qd  upon d/c  Endo Dr. Reyes following, note appreciated.    Problem/Plan - 4:  ·  Problem: Transaminitis.   ·  Plan: likely due to pancreatitis  - Elevated T bili- per RUQ US Common bile duct measures 5 mm  - 2/23-RUQ US showed evidence of hepatic steatosis, hepatomegaly. No evidence of biliary ductal tube dilation or cholecystitis   - Continue to monitor LFTs  - Hepatologist-Dr. Gonzalez following, note appreciated.   A 32 y/o Male with hx of DM on Janumet, presented w/ generalized epigastric-predominant abdominal pain x 3 days. Worsened with laying and standing, non-food related per se.   Patient reports vomiting episode and persistent nausea.   In the ED pt was found afebrile, HR 88, /82, Sat 99% on RA. Labs on admission significant for Hyponatremia (Na 128),  TG 1894, Lipase 4550. CT Abd/pelvis showed moderate peripancreatic stranding and edema c/w pancreatitis.  pt received 2L NS bolus in ED.   Patient is admitted to ICU for pancreatitis requiring insulin drip.   ICU course;  While in the ICU pt was initially NPO then advanced diet slowly as tolerated. Pain controlled with Dilaudid, then switched to Tramadol and Toradol. While on insulin drip + D5 1/2 NS.  BG was monitored closely and remained wnl. Lipase and TGC down trending. Once pt reached triglycerides of 647 he was switched to lantus 10 U + SSI moderate scale. Also was started on Gemfibrozil 600 mg q 12 hrs. Given elevated BG levels readjusted regimen to lantus 15U + Admelog 2U + SSI (mod).    Endocrinology Dr. Reyes consulted, recommended that pt should not continue janumet on discharge, could be managed with  metformin and low dose sulfonylurea upon d/c. Due to  elevated Tbil, RUQ was ordered, which showed hepatomegaly  w/ hepatic steatosis. It did not show evidence of cholelythiasis or biliary duct dilation.     Given pt no longer requiring insulin drip and he is stable, pt downgraded to medicine floor 2/24/22.   Hospital course complicated due to abdominal tenderness. GI dr. Arreguin consulted.  s/p MRCP-resulted < from: MR MRCP w/wo IV Cont (03.02.22 @ 10:44) >  Peripancreatic edema, compatible with acute pancreatitis. No evidence for pancreatic necrosis. 2.0 x 8.0 cm fluid collection adjacent to the   pancreas inferiorly, likely representing acute peripancreatic fluid collection. T1 hyperintensity within this fluid collection, suggestive of   hemorrhagic or proteinaceous content. The common bile duct measures up to 5 mm in caliber which is within   normal limits. No evidence for choledocholithiasis. No evidence for cholelithiasis. Splenomegaly. Small ascites.  Mildly enlarged peripancreatic lymph node.  < end of copied text >    GI recommending outpatient follow up. Patient tolerating advanced diet well.  Pt is uninsured and VIVO meds provided on discharge.     Patient is clinically improving and decision made for discharge home with outpatient follow up.   Please refer to medical records/progress notes for in depth hospital course.

## 2022-02-25 NOTE — PROGRESS NOTE ADULT - ASSESSMENT
30yo M with h/o DM on Janumet, presented w/ generalized epigastric-predominant abdominal pain x 3 days. Worsened with laying and standing, non-food related per se. Patient reports vomiting episode and persistent nausea. Patient reports he had similar complaints 2 years ago but it was not this bad and did not require hospitalization. In the ED pt was found afebrile, HR 88, /82, Sat 99% on RA. Labs on admission significant for Na 128,  TG 1894, Lipase 4550. CT A/P showed moderate peripancreatic stranding and edema c/w pancreatitis. In the ED pt received 2L NS bolus.  Admitted to ICU for pancreatitis requiring insulin drip.  Downgraded to 4S on 2/24/22.

## 2022-02-26 LAB
ALBUMIN SERPL ELPH-MCNC: 2.6 G/DL — LOW (ref 3.5–5)
ALP SERPL-CCNC: 164 U/L — HIGH (ref 40–120)
ALT FLD-CCNC: 111 U/L DA — HIGH (ref 10–60)
ANION GAP SERPL CALC-SCNC: 10 MMOL/L — SIGNIFICANT CHANGE UP (ref 5–17)
ANISOCYTOSIS BLD QL: SLIGHT — SIGNIFICANT CHANGE UP
AST SERPL-CCNC: 76 U/L — HIGH (ref 10–40)
BASOPHILS # BLD AUTO: 0.12 K/UL — SIGNIFICANT CHANGE UP (ref 0–0.2)
BASOPHILS NFR BLD AUTO: 1 % — SIGNIFICANT CHANGE UP (ref 0–2)
BILIRUB SERPL-MCNC: 1.3 MG/DL — HIGH (ref 0.2–1.2)
BUN SERPL-MCNC: 13 MG/DL — SIGNIFICANT CHANGE UP (ref 7–18)
CALCIUM SERPL-MCNC: 9.3 MG/DL — SIGNIFICANT CHANGE UP (ref 8.4–10.5)
CHLORIDE SERPL-SCNC: 102 MMOL/L — SIGNIFICANT CHANGE UP (ref 96–108)
CO2 SERPL-SCNC: 22 MMOL/L — SIGNIFICANT CHANGE UP (ref 22–31)
CREAT SERPL-MCNC: 0.68 MG/DL — SIGNIFICANT CHANGE UP (ref 0.5–1.3)
CULTURE RESULTS: SIGNIFICANT CHANGE UP
CULTURE RESULTS: SIGNIFICANT CHANGE UP
EOSINOPHIL # BLD AUTO: 1.16 K/UL — HIGH (ref 0–0.5)
EOSINOPHIL NFR BLD AUTO: 10 % — HIGH (ref 0–6)
GLUCOSE BLDC GLUCOMTR-MCNC: 176 MG/DL — HIGH (ref 70–99)
GLUCOSE BLDC GLUCOMTR-MCNC: 194 MG/DL — HIGH (ref 70–99)
GLUCOSE BLDC GLUCOMTR-MCNC: 201 MG/DL — HIGH (ref 70–99)
GLUCOSE BLDC GLUCOMTR-MCNC: 275 MG/DL — HIGH (ref 70–99)
GLUCOSE SERPL-MCNC: 200 MG/DL — HIGH (ref 70–99)
HCT VFR BLD CALC: 38.7 % — LOW (ref 39–50)
HGB BLD-MCNC: 13.5 G/DL — SIGNIFICANT CHANGE UP (ref 13–17)
HYPOCHROMIA BLD QL: SLIGHT — SIGNIFICANT CHANGE UP
LYMPHOCYTES # BLD AUTO: 1.51 K/UL — SIGNIFICANT CHANGE UP (ref 1–3.3)
LYMPHOCYTES # BLD AUTO: 13 % — SIGNIFICANT CHANGE UP (ref 13–44)
MAGNESIUM SERPL-MCNC: 2.4 MG/DL — SIGNIFICANT CHANGE UP (ref 1.6–2.6)
MANUAL SMEAR VERIFICATION: SIGNIFICANT CHANGE UP
MCHC RBC-ENTMCNC: 28 PG — SIGNIFICANT CHANGE UP (ref 27–34)
MCHC RBC-ENTMCNC: 34.9 GM/DL — SIGNIFICANT CHANGE UP (ref 32–36)
MCV RBC AUTO: 80.1 FL — SIGNIFICANT CHANGE UP (ref 80–100)
MONOCYTES # BLD AUTO: 0.81 K/UL — SIGNIFICANT CHANGE UP (ref 0–0.9)
MONOCYTES NFR BLD AUTO: 7 % — SIGNIFICANT CHANGE UP (ref 2–14)
MYELOCYTES NFR BLD: 4 % — HIGH (ref 0–0)
NEUTROPHILS # BLD AUTO: 7.53 K/UL — HIGH (ref 1.8–7.4)
NEUTROPHILS NFR BLD AUTO: 63 % — SIGNIFICANT CHANGE UP (ref 43–77)
NEUTS BAND # BLD: 2 % — SIGNIFICANT CHANGE UP (ref 0–8)
NRBC # BLD: 1 /100 — HIGH (ref 0–0)
PHOSPHATE SERPL-MCNC: 4.3 MG/DL — SIGNIFICANT CHANGE UP (ref 2.5–4.5)
PLAT MORPH BLD: NORMAL — SIGNIFICANT CHANGE UP
PLATELET # BLD AUTO: 332 K/UL — SIGNIFICANT CHANGE UP (ref 150–400)
PLATELET COUNT - ESTIMATE: NORMAL — SIGNIFICANT CHANGE UP
POIKILOCYTOSIS BLD QL AUTO: SLIGHT — SIGNIFICANT CHANGE UP
POLYCHROMASIA BLD QL SMEAR: SLIGHT — SIGNIFICANT CHANGE UP
POTASSIUM SERPL-MCNC: 3.7 MMOL/L — SIGNIFICANT CHANGE UP (ref 3.5–5.3)
POTASSIUM SERPL-SCNC: 3.7 MMOL/L — SIGNIFICANT CHANGE UP (ref 3.5–5.3)
PROT SERPL-MCNC: 7.7 G/DL — SIGNIFICANT CHANGE UP (ref 6–8.3)
RBC # BLD: 4.83 M/UL — SIGNIFICANT CHANGE UP (ref 4.2–5.8)
RBC # FLD: 13.5 % — SIGNIFICANT CHANGE UP (ref 10.3–14.5)
RBC BLD AUTO: ABNORMAL
SODIUM SERPL-SCNC: 134 MMOL/L — LOW (ref 135–145)
SPECIMEN SOURCE: SIGNIFICANT CHANGE UP
SPECIMEN SOURCE: SIGNIFICANT CHANGE UP
WBC # BLD: 11.58 K/UL — HIGH (ref 3.8–10.5)
WBC # FLD AUTO: 11.58 K/UL — HIGH (ref 3.8–10.5)

## 2022-02-26 RX ORDER — POLYETHYLENE GLYCOL 3350 17 G/17G
17 POWDER, FOR SOLUTION ORAL
Refills: 0 | Status: COMPLETED | OUTPATIENT
Start: 2022-02-26 | End: 2022-02-27

## 2022-02-26 RX ORDER — OXYCODONE HYDROCHLORIDE 5 MG/1
10 TABLET ORAL EVERY 6 HOURS
Refills: 0 | Status: DISCONTINUED | OUTPATIENT
Start: 2022-02-26 | End: 2022-02-27

## 2022-02-26 RX ORDER — KETOROLAC TROMETHAMINE 30 MG/ML
15 SYRINGE (ML) INJECTION EVERY 6 HOURS
Refills: 0 | Status: DISCONTINUED | OUTPATIENT
Start: 2022-02-26 | End: 2022-02-27

## 2022-02-26 RX ADMIN — Medication 15 MILLIGRAM(S): at 22:39

## 2022-02-26 RX ADMIN — OXYCODONE HYDROCHLORIDE 10 MILLIGRAM(S): 5 TABLET ORAL at 17:15

## 2022-02-26 RX ADMIN — Medication 4: at 08:19

## 2022-02-26 RX ADMIN — Medication 15 MILLIGRAM(S): at 12:15

## 2022-02-26 RX ADMIN — Medication 2 UNIT(S): at 12:21

## 2022-02-26 RX ADMIN — Medication 6: at 17:14

## 2022-02-26 RX ADMIN — TRAMADOL HYDROCHLORIDE 25 MILLIGRAM(S): 50 TABLET ORAL at 11:41

## 2022-02-26 RX ADMIN — INSULIN GLARGINE 15 UNIT(S): 100 INJECTION, SOLUTION SUBCUTANEOUS at 22:25

## 2022-02-26 RX ADMIN — SENNA PLUS 2 TABLET(S): 8.6 TABLET ORAL at 22:24

## 2022-02-26 RX ADMIN — Medication 600 MILLIGRAM(S): at 17:15

## 2022-02-26 RX ADMIN — Medication 2 UNIT(S): at 17:15

## 2022-02-26 RX ADMIN — POLYETHYLENE GLYCOL 3350 17 GRAM(S): 17 POWDER, FOR SOLUTION ORAL at 12:14

## 2022-02-26 RX ADMIN — Medication 15 MILLIGRAM(S): at 03:26

## 2022-02-26 RX ADMIN — ENOXAPARIN SODIUM 40 MILLIGRAM(S): 100 INJECTION SUBCUTANEOUS at 12:21

## 2022-02-26 RX ADMIN — Medication 2: at 12:19

## 2022-02-26 RX ADMIN — Medication 15 MILLIGRAM(S): at 22:19

## 2022-02-26 RX ADMIN — Medication 600 MILLIGRAM(S): at 06:10

## 2022-02-26 RX ADMIN — TRAMADOL HYDROCHLORIDE 25 MILLIGRAM(S): 50 TABLET ORAL at 10:16

## 2022-02-26 RX ADMIN — OXYCODONE HYDROCHLORIDE 10 MILLIGRAM(S): 5 TABLET ORAL at 18:44

## 2022-02-26 RX ADMIN — Medication 15 MILLIGRAM(S): at 13:54

## 2022-02-26 RX ADMIN — POLYETHYLENE GLYCOL 3350 17 GRAM(S): 17 POWDER, FOR SOLUTION ORAL at 17:18

## 2022-02-26 RX ADMIN — Medication 15 MILLIGRAM(S): at 03:05

## 2022-02-26 RX ADMIN — Medication 2 UNIT(S): at 08:20

## 2022-02-26 RX ADMIN — PANTOPRAZOLE SODIUM 40 MILLIGRAM(S): 20 TABLET, DELAYED RELEASE ORAL at 06:10

## 2022-02-26 NOTE — PROGRESS NOTE ADULT - SUBJECTIVE AND OBJECTIVE BOX
Patient is a 31y old  Male who presents with a chief complaint of Abdominal pain (25 Feb 2022 18:39)    PATIENT IS SEEN AND EXAMINED IN MEDICAL FLOOR.    NITZAT [    ]    MONA [   ]      GT [   ]    ALLERGIES:  No Known Allergies      Daily     Daily     VITALS:    Vital Signs Last 24 Hrs  T(C): 36.7 (26 Feb 2022 13:02), Max: 36.8 (25 Feb 2022 19:00)  T(F): 98.1 (26 Feb 2022 13:02), Max: 98.3 (25 Feb 2022 19:00)  HR: 71 (26 Feb 2022 13:02) (67 - 74)  BP: 128/85 (26 Feb 2022 13:02) (125/77 - 128/85)  BP(mean): --  RR: 18 (26 Feb 2022 13:02) (18 - 18)  SpO2: 98% (26 Feb 2022 13:02) (97% - 98%)    LABS:    CBC Full  -  ( 26 Feb 2022 06:16 )  WBC Count : 11.58 K/uL  RBC Count : 4.83 M/uL  Hemoglobin : 13.5 g/dL  Hematocrit : 38.7 %  Platelet Count - Automated : 332 K/uL  Mean Cell Volume : 80.1 fl  Mean Cell Hemoglobin : 28.0 pg  Mean Cell Hemoglobin Concentration : 34.9 gm/dL  Auto Neutrophil # : 7.53 K/uL  Auto Lymphocyte # : 1.51 K/uL  Auto Monocyte # : 0.81 K/uL  Auto Eosinophil # : 1.16 K/uL  Auto Basophil # : 0.12 K/uL  Auto Neutrophil % : 63.0 %  Auto Lymphocyte % : 13.0 %  Auto Monocyte % : 7.0 %  Auto Eosinophil % : 10.0 %  Auto Basophil % : 1.0 %      02-26    134<L>  |  102  |  13  ----------------------------<  200<H>  3.7   |  22  |  0.68    Ca    9.3      26 Feb 2022 06:16  Phos  4.3     02-26  Mg     2.4     02-26    TPro  7.7  /  Alb  2.6<L>  /  TBili  1.3<H>  /  DBili  x   /  AST  76<H>  /  ALT  111<H>  /  AlkPhos  164<H>  02-26    CAPILLARY BLOOD GLUCOSE      POCT Blood Glucose.: 194 mg/dL (26 Feb 2022 11:33)  POCT Blood Glucose.: 201 mg/dL (26 Feb 2022 07:59)  POCT Blood Glucose.: 184 mg/dL (25 Feb 2022 20:53)  POCT Blood Glucose.: 231 mg/dL (25 Feb 2022 17:00)        LIVER FUNCTIONS - ( 26 Feb 2022 06:16 )  Alb: 2.6 g/dL / Pro: 7.7 g/dL / ALK PHOS: 164 U/L / ALT: 111 U/L DA / AST: 76 U/L / GGT: x           Creatinine Trend: 0.68<--, 0.69<--, 0.52<--, 0.54<--, 0.68<--, 0.72<--  I&O's Summary    25 Feb 2022 07:01  -  26 Feb 2022 07:00  --------------------------------------------------------  IN: 0 mL / OUT: 2 mL / NET: -2 mL            .Blood Blood-Peripheral  02-21 @ 06:27   No Growth Final  --  --      Clean Catch Clean Catch (Midstream)  02-20 @ 12:33   No growth  --  --          MEDICATIONS:    MEDICATIONS  (STANDING):  dextrose 5%. 1000 milliLiter(s) (100 mL/Hr) IV Continuous <Continuous>  enoxaparin Injectable 40 milliGRAM(s) SubCutaneous daily  gemfibrozil 600 milliGRAM(s) Oral every 12 hours  glucagon  Injectable 1 milliGRAM(s) IntraMuscular once  insulin glargine Injectable (LANTUS) 15 Unit(s) SubCutaneous at bedtime  insulin lispro (ADMELOG) corrective regimen sliding scale   SubCutaneous three times a day before meals  insulin lispro Injectable (ADMELOG) 2 Unit(s) SubCutaneous three times a day before meals  pantoprazole    Tablet 40 milliGRAM(s) Oral before breakfast  polyethylene glycol 3350 17 Gram(s) Oral two times a day  senna 2 Tablet(s) Oral at bedtime      MEDICATIONS  (PRN):  ketorolac   Injectable 15 milliGRAM(s) IV Push every 6 hours PRN Mild Pain (1 - 3)  oxyCODONE    IR 10 milliGRAM(s) Oral every 6 hours PRN Severe Pain (7 - 10)        REVIEW OF SYSTEMS:                           ALL ROS DONE [ X   ]      CONSTITUTIONAL:  LETHARGIC [   ], FEVER [   ], UNRESPONSIVE [   ]  CVS:  CP  [   ], SOB, [   ], PALPITATIONS [   ], DIZZYNESS [   ]  RS: COUGH [   ], SPUTUM [   ]  GI: ABDOMINAL PAIN [   ], NAUSEA [   ], VOMITINGS [   ], DIARRHEA [   ], CONSTIPATION [   ]  :  DYSURIA [   ], NOCTURIA [   ], INCREASED FREQUENCY [   ], DRIBLING [   ],  SKELETAL: PAINFUL JOINTS [   ], SWOLLEN JOINTS [   ], NECK ACHE [   ], LOW BACK ACHE [   ],  SKIN : ULCERS [   ], RASH [   ], ITCHING [   ]  CNS: HEAD ACHE [   ], DOUBLE VISION [   ], BLURRED VISION [   ], AMS / CONFUSION [   ], SEIZURES [   ], WEAKNESS [   ],TINGLING / NUMBNESS [   ]      PHYSICAL EXAMINATION:    GENERAL APPEARANCE: NO DISTRESS  HEENT:  NO PALLOR, NO  JVD,  NO   NODES, NECK SUPPLE  CVS: S1 +, S2 +,   RS: AEEB,  OCCASIONAL  RALES +,   NO RONCHI  ABD: SOFT, NT, NO, BS +  EXT: NO PE  SKIN: WARM,   SKELETAL:  ROM ACCEPTABLE  CNS:  AAO X  2-3  , NO  DEFICITS        RADIOLOGY :    < from: US Abdomen Upper Quadrant Right (02.23.22 @ 12:26) >    IMPRESSION:    Hepatomegaly and hepatic steatosis.  No cholelithiasis or biliary ductal dilatation.    < end of copied text >        ASSESSMENT :     Acute pancreatitis without infection or necrosis    Diabetes        PLAN:  HPI:  31yoM with h/o DMon oral medications (unsure about name and dose), presents generalized though possibly slightly epigastric-predominant abdominal pain x 3 days. Worsened with laying and standing, non-food related per se. Patient reports vomiting episode and persistent nausea.  Also notes constipation (last BM yesterday) and low urine output.   Patient also reports unable to pass flatus and had last BM on 1 day ago.   Patient reports he had similar complaints 2 years ago but it was not this bad and did not require hospitalization.    # ACUTE PANCREATITIS  # HYPERTRIGLYCERIDEMIA - IMPROVING  # ALCOHOL ABUSE  # DM2, HYPERGLYCEMIA  - GIVEN IVF, S/P INSULIN DRIP, LANTUS SSI +FS ; RECOMMENDED FOR D/C ON METFORMIN AND LOW-DOSE SULFONYLUREA  - ON GEMFIBROZIL  - COUNSELLED PATIENT ON CESSATION > 10 MINS, SW CONSULT FOR RESOURCES  - TOLERATING CLEAR LIQUID DIET, WILL ADVANCE AS TOLERATED  - DIABETIC EDUCATION  - PRN PAIN CONTROL  - ENDOCRINOLOGY CONSULT    - PATIENT DID NOT TOLERATE SOFT DIET [2/25] - SWITCHED BACK TO FULL LIQUID    # TRANSAMINITIS  # HEPATIC STEATOSIS  - NOTED RUQ U/S  - MONITORING LFTS  - F/U HEPATITIS PANEL  - HEPATOLOGY CONSULT    # HYPONATREMIA - RESOLVED  # HYPOKALEMIA - REPLETING WITH SUPPLEMENT  # HYPOPHOSPHATEMIA -RESOLVED    # OBESITY - COUNSELLED ON NUTRITION AND LIFESTYLE CHANGES    # GI AND DVT PPX    DR. PETE PEMBERTON

## 2022-02-27 LAB
ANION GAP SERPL CALC-SCNC: 8 MMOL/L — SIGNIFICANT CHANGE UP (ref 5–17)
BUN SERPL-MCNC: 17 MG/DL — SIGNIFICANT CHANGE UP (ref 7–18)
CALCIUM SERPL-MCNC: 9 MG/DL — SIGNIFICANT CHANGE UP (ref 8.4–10.5)
CHLORIDE SERPL-SCNC: 103 MMOL/L — SIGNIFICANT CHANGE UP (ref 96–108)
CO2 SERPL-SCNC: 24 MMOL/L — SIGNIFICANT CHANGE UP (ref 22–31)
CREAT SERPL-MCNC: 0.78 MG/DL — SIGNIFICANT CHANGE UP (ref 0.5–1.3)
GLUCOSE BLDC GLUCOMTR-MCNC: 162 MG/DL — HIGH (ref 70–99)
GLUCOSE BLDC GLUCOMTR-MCNC: 211 MG/DL — HIGH (ref 70–99)
GLUCOSE BLDC GLUCOMTR-MCNC: 228 MG/DL — HIGH (ref 70–99)
GLUCOSE BLDC GLUCOMTR-MCNC: 232 MG/DL — HIGH (ref 70–99)
GLUCOSE BLDC GLUCOMTR-MCNC: 273 MG/DL — HIGH (ref 70–99)
GLUCOSE SERPL-MCNC: 190 MG/DL — HIGH (ref 70–99)
HCT VFR BLD CALC: 38.2 % — LOW (ref 39–50)
HGB BLD-MCNC: 12.8 G/DL — LOW (ref 13–17)
MCHC RBC-ENTMCNC: 27.1 PG — SIGNIFICANT CHANGE UP (ref 27–34)
MCHC RBC-ENTMCNC: 33.5 GM/DL — SIGNIFICANT CHANGE UP (ref 32–36)
MCV RBC AUTO: 80.9 FL — SIGNIFICANT CHANGE UP (ref 80–100)
NRBC # BLD: 0 /100 WBCS — SIGNIFICANT CHANGE UP (ref 0–0)
PLATELET # BLD AUTO: 361 K/UL — SIGNIFICANT CHANGE UP (ref 150–400)
POTASSIUM SERPL-MCNC: 3.7 MMOL/L — SIGNIFICANT CHANGE UP (ref 3.5–5.3)
POTASSIUM SERPL-SCNC: 3.7 MMOL/L — SIGNIFICANT CHANGE UP (ref 3.5–5.3)
RBC # BLD: 4.72 M/UL — SIGNIFICANT CHANGE UP (ref 4.2–5.8)
RBC # FLD: 13 % — SIGNIFICANT CHANGE UP (ref 10.3–14.5)
SARS-COV-2 RNA SPEC QL NAA+PROBE: SIGNIFICANT CHANGE UP
SODIUM SERPL-SCNC: 135 MMOL/L — SIGNIFICANT CHANGE UP (ref 135–145)
WBC # BLD: 11.22 K/UL — HIGH (ref 3.8–10.5)
WBC # FLD AUTO: 11.22 K/UL — HIGH (ref 3.8–10.5)

## 2022-02-27 RX ORDER — METFORMIN HYDROCHLORIDE 850 MG/1
1 TABLET ORAL
Qty: 60 | Refills: 0
Start: 2022-02-27 | End: 2022-03-28

## 2022-02-27 RX ORDER — METFORMIN HYDROCHLORIDE 850 MG/1
500 TABLET ORAL
Refills: 0 | Status: DISCONTINUED | OUTPATIENT
Start: 2022-02-27 | End: 2022-03-02

## 2022-02-27 RX ORDER — SITAGLIPTIN AND METFORMIN HYDROCHLORIDE 500; 50 MG/1; MG/1
1 TABLET, FILM COATED ORAL
Qty: 0 | Refills: 0 | DISCHARGE

## 2022-02-27 RX ORDER — GLIMEPIRIDE 1 MG
1 TABLET ORAL
Qty: 30 | Refills: 0
Start: 2022-02-27 | End: 2022-03-28

## 2022-02-27 RX ORDER — GEMFIBROZIL 600 MG
1 TABLET ORAL
Qty: 60 | Refills: 0
Start: 2022-02-27 | End: 2022-03-28

## 2022-02-27 RX ORDER — PANTOPRAZOLE SODIUM 20 MG/1
1 TABLET, DELAYED RELEASE ORAL
Qty: 30 | Refills: 0
Start: 2022-02-27 | End: 2022-03-28

## 2022-02-27 RX ORDER — KETOROLAC TROMETHAMINE 30 MG/ML
15 SYRINGE (ML) INJECTION ONCE
Refills: 0 | Status: DISCONTINUED | OUTPATIENT
Start: 2022-02-27 | End: 2022-02-27

## 2022-02-27 RX ADMIN — Medication 2 UNIT(S): at 11:49

## 2022-02-27 RX ADMIN — SENNA PLUS 2 TABLET(S): 8.6 TABLET ORAL at 22:34

## 2022-02-27 RX ADMIN — Medication 4: at 11:49

## 2022-02-27 RX ADMIN — Medication 2 UNIT(S): at 08:21

## 2022-02-27 RX ADMIN — INSULIN GLARGINE 15 UNIT(S): 100 INJECTION, SOLUTION SUBCUTANEOUS at 22:38

## 2022-02-27 RX ADMIN — Medication 600 MILLIGRAM(S): at 05:14

## 2022-02-27 RX ADMIN — Medication 15 MILLIGRAM(S): at 13:43

## 2022-02-27 RX ADMIN — Medication 4: at 08:21

## 2022-02-27 RX ADMIN — POLYETHYLENE GLYCOL 3350 17 GRAM(S): 17 POWDER, FOR SOLUTION ORAL at 05:14

## 2022-02-27 RX ADMIN — Medication 2 UNIT(S): at 17:23

## 2022-02-27 RX ADMIN — Medication 15 MILLIGRAM(S): at 22:34

## 2022-02-27 RX ADMIN — Medication 15 MILLIGRAM(S): at 22:32

## 2022-02-27 RX ADMIN — Medication 15 MILLIGRAM(S): at 05:12

## 2022-02-27 RX ADMIN — PANTOPRAZOLE SODIUM 40 MILLIGRAM(S): 20 TABLET, DELAYED RELEASE ORAL at 06:45

## 2022-02-27 RX ADMIN — Medication 15 MILLIGRAM(S): at 13:18

## 2022-02-27 RX ADMIN — Medication 600 MILLIGRAM(S): at 17:24

## 2022-02-27 RX ADMIN — METFORMIN HYDROCHLORIDE 500 MILLIGRAM(S): 850 TABLET ORAL at 19:34

## 2022-02-27 RX ADMIN — ENOXAPARIN SODIUM 40 MILLIGRAM(S): 100 INJECTION SUBCUTANEOUS at 11:49

## 2022-02-27 RX ADMIN — Medication 2: at 17:23

## 2022-02-27 RX ADMIN — Medication 15 MILLIGRAM(S): at 05:40

## 2022-02-27 NOTE — PROGRESS NOTE ADULT - SUBJECTIVE AND OBJECTIVE BOX
Patient is a 31y old  Male who presents with a chief complaint of Abdominal pain (2022 13:18)    PATIENT IS SEEN AND EXAMINED IN MEDICAL FLOOR.    NGT [    ]    MONA [   ]      GT [   ]    ALLERGIES:  No Known Allergies      Daily     Daily Weight in k.9 (2022 05:00)    VITALS:    Vital Signs Last 24 Hrs  T(C): 36.2 (2022 13:12), Max: 37.6 (2022 21:06)  T(F): 97.2 (2022 13:12), Max: 99.6 (2022 21:06)  HR: 89 (2022 13:12) (77 - 89)  BP: 139/79 (2022 13:12) (126/78 - 139/79)  BP(mean): --  RR: 18 (2022 13:12) (18 - 19)  SpO2: 97% (2022 05:00) (97% - 97%)    LABS:    CBC Full  -  ( 2022 15:09 )  WBC Count : 11.22 K/uL  RBC Count : 4.72 M/uL  Hemoglobin : 12.8 g/dL  Hematocrit : 38.2 %  Platelet Count - Automated : 361 K/uL  Mean Cell Volume : 80.9 fl  Mean Cell Hemoglobin : 27.1 pg  Mean Cell Hemoglobin Concentration : 33.5 gm/dL  Auto Neutrophil # : x  Auto Lymphocyte # : x  Auto Monocyte # : x  Auto Eosinophil # : x  Auto Basophil # : x  Auto Neutrophil % : x  Auto Lymphocyte % : x  Auto Monocyte % : x  Auto Eosinophil % : x  Auto Basophil % : x          135  |  103  |  17  ----------------------------<  190<H>  3.7   |  24  |  0.78    Ca    9.0      2022 15:09  Phos  4.3       Mg     2.4         TPro  7.7  /  Alb  2.6<L>  /  TBili  1.3<H>  /  DBili  x   /  AST  76<H>  /  ALT  111<H>  /  AlkPhos  164<H>      CAPILLARY BLOOD GLUCOSE      POCT Blood Glucose.: 162 mg/dL (2022 16:30)  POCT Blood Glucose.: 211 mg/dL (2022 11:48)  POCT Blood Glucose.: 232 mg/dL (2022 07:46)  POCT Blood Glucose.: 176 mg/dL (2022 22:23)        LIVER FUNCTIONS - ( 2022 06:16 )  Alb: 2.6 g/dL / Pro: 7.7 g/dL / ALK PHOS: 164 U/L / ALT: 111 U/L DA / AST: 76 U/L / GGT: x           Creatinine Trend: 0.78<--, 0.68<--, 0.69<--, 0.52<--, 0.54<--, 0.68<--  I&O's Summary    2022 07:01  -  2022 07:00  --------------------------------------------------------  IN: 100 mL / OUT: 0 mL / NET: 100 mL            .Blood Blood-Peripheral  - @ 06:27   No Growth Final  --  --      Clean Catch Clean Catch (Midstream)  - @ 12:33   No growth  --  --          MEDICATIONS:    MEDICATIONS  (STANDING):  dextrose 5%. 1000 milliLiter(s) (100 mL/Hr) IV Continuous <Continuous>  enoxaparin Injectable 40 milliGRAM(s) SubCutaneous daily  gemfibrozil 600 milliGRAM(s) Oral every 12 hours  glucagon  Injectable 1 milliGRAM(s) IntraMuscular once  insulin glargine Injectable (LANTUS) 15 Unit(s) SubCutaneous at bedtime  insulin lispro (ADMELOG) corrective regimen sliding scale   SubCutaneous three times a day before meals  insulin lispro Injectable (ADMELOG) 2 Unit(s) SubCutaneous three times a day before meals  pantoprazole    Tablet 40 milliGRAM(s) Oral before breakfast  senna 2 Tablet(s) Oral at bedtime      MEDICATIONS  (PRN):        REVIEW OF SYSTEMS:                           ALL ROS DONE [ X   ]      CONSTITUTIONAL:  LETHARGIC [   ], FEVER [   ], UNRESPONSIVE [   ]  CVS:  CP  [   ], SOB, [   ], PALPITATIONS [   ], DIZZYNESS [   ]  RS: COUGH [   ], SPUTUM [   ]  GI: ABDOMINAL PAIN [   ], NAUSEA [   ], VOMITINGS [   ], DIARRHEA [   ], CONSTIPATION [   ]  :  DYSURIA [   ], NOCTURIA [   ], INCREASED FREQUENCY [   ], DRIBLING [   ],  SKELETAL: PAINFUL JOINTS [   ], SWOLLEN JOINTS [   ], NECK ACHE [   ], LOW BACK ACHE [   ],  SKIN : ULCERS [   ], RASH [   ], ITCHING [   ]  CNS: HEAD ACHE [   ], DOUBLE VISION [   ], BLURRED VISION [   ], AMS / CONFUSION [   ], SEIZURES [   ], WEAKNESS [   ],TINGLING / NUMBNESS [   ]        PHYSICAL EXAMINATION:    GENERAL APPEARANCE: NO DISTRESS  HEENT:  NO PALLOR, NO  JVD,  NO   NODES, NECK SUPPLE  CVS: S1 +, S2 +,   RS: AEEB,  OCCASIONAL  RALES +,   NO RONCHI  ABD: SOFT, NT, NO, BS +  EXT: NO PE  SKIN: WARM,   SKELETAL:  ROM ACCEPTABLE  CNS:  AAO X  2-3  , NO  DEFICITS        RADIOLOGY :    < from: US Abdomen Upper Quadrant Right (22 @ 12:26) >    IMPRESSION:    Hepatomegaly and hepatic steatosis.  No cholelithiasis or biliary ductal dilatation.    < end of copied text >        ASSESSMENT :     Acute pancreatitis without infection or necrosis    Diabetes        PLAN:  HPI:  31yoM with h/o DMon oral medications (unsure about name and dose), presents generalized though possibly slightly epigastric-predominant abdominal pain x 3 days. Worsened with laying and standing, non-food related per se. Patient reports vomiting episode and persistent nausea.  Also notes constipation (last BM yesterday) and low urine output.   Patient also reports unable to pass flatus and had last BM on 1 day ago.   Patient reports he had similar complaints 2 years ago but it was not this bad and did not require hospitalization.    # ACUTE PANCREATITIS  # HYPERTRIGLYCERIDEMIA - IMPROVING  # ALCOHOL ABUSE  # DM2, HYPERGLYCEMIA  - GIVEN IVF, S/P INSULIN DRIP, LANTUS SSI +FS ; RECOMMENDED FOR D/C ON METFORMIN AND LOW-DOSE SULFONYLUREA  - ON GEMFIBROZIL  - COUNSELLED PATIENT ON CESSATION > 10 MINS, SW CONSULT FOR RESOURCES  - TOLERATING CLEAR LIQUID DIET, WILL ADVANCE AS TOLERATED  - DIABETIC EDUCATION  - PRN PAIN CONTROL  - ENDOCRINOLOGY CONSULT    - PATIENT DID NOT TOLERATE SOFT DIET [] - SWITCHED BACK TO FULL LIQUID    # TRANSAMINITIS  # HEPATIC STEATOSIS  - NOTED RUQ U/S  - MONITORING LFTS  - F/U HEPATITIS PANEL  - HEPATOLOGY CONSULT    # HYPONATREMIA - RESOLVED  # HYPOKALEMIA - REPLETING WITH SUPPLEMENT  # HYPOPHOSPHATEMIA -RESOLVED    # OBESITY - COUNSELLED ON NUTRITION AND LIFESTYLE CHANGES    # GI AND DVT PPX    DR. PETE PEMBERTON Patient is a 31y old  Male who presents with a chief complaint of Abdominal pain (2022 13:18)    PATIENT IS SEEN AND EXAMINED IN MEDICAL FLOOR.    NGT [    ]    MONA [   ]      GT [   ]    ALLERGIES:  No Known Allergies      Daily     Daily Weight in k.9 (2022 05:00)    VITALS:    Vital Signs Last 24 Hrs  T(C): 36.2 (2022 13:12), Max: 37.6 (2022 21:06)  T(F): 97.2 (2022 13:12), Max: 99.6 (2022 21:06)  HR: 89 (2022 13:12) (77 - 89)  BP: 139/79 (2022 13:12) (126/78 - 139/79)  BP(mean): --  RR: 18 (2022 13:12) (18 - 19)  SpO2: 97% (2022 05:00) (97% - 97%)    LABS:    CBC Full  -  ( 2022 15:09 )  WBC Count : 11.22 K/uL  RBC Count : 4.72 M/uL  Hemoglobin : 12.8 g/dL  Hematocrit : 38.2 %  Platelet Count - Automated : 361 K/uL  Mean Cell Volume : 80.9 fl  Mean Cell Hemoglobin : 27.1 pg  Mean Cell Hemoglobin Concentration : 33.5 gm/dL  Auto Neutrophil # : x  Auto Lymphocyte # : x  Auto Monocyte # : x  Auto Eosinophil # : x  Auto Basophil # : x  Auto Neutrophil % : x  Auto Lymphocyte % : x  Auto Monocyte % : x  Auto Eosinophil % : x  Auto Basophil % : x          135  |  103  |  17  ----------------------------<  190<H>  3.7   |  24  |  0.78    Ca    9.0      2022 15:09  Phos  4.3       Mg     2.4         TPro  7.7  /  Alb  2.6<L>  /  TBili  1.3<H>  /  DBili  x   /  AST  76<H>  /  ALT  111<H>  /  AlkPhos  164<H>      CAPILLARY BLOOD GLUCOSE      POCT Blood Glucose.: 162 mg/dL (2022 16:30)  POCT Blood Glucose.: 211 mg/dL (2022 11:48)  POCT Blood Glucose.: 232 mg/dL (2022 07:46)  POCT Blood Glucose.: 176 mg/dL (2022 22:23)        LIVER FUNCTIONS - ( 2022 06:16 )  Alb: 2.6 g/dL / Pro: 7.7 g/dL / ALK PHOS: 164 U/L / ALT: 111 U/L DA / AST: 76 U/L / GGT: x           Creatinine Trend: 0.78<--, 0.68<--, 0.69<--, 0.52<--, 0.54<--, 0.68<--  I&O's Summary    2022 07:01  -  2022 07:00  --------------------------------------------------------  IN: 100 mL / OUT: 0 mL / NET: 100 mL            .Blood Blood-Peripheral  - @ 06:27   No Growth Final  --  --      Clean Catch Clean Catch (Midstream)  - @ 12:33   No growth  --  --          MEDICATIONS:    MEDICATIONS  (STANDING):  dextrose 5%. 1000 milliLiter(s) (100 mL/Hr) IV Continuous <Continuous>  enoxaparin Injectable 40 milliGRAM(s) SubCutaneous daily  gemfibrozil 600 milliGRAM(s) Oral every 12 hours  glucagon  Injectable 1 milliGRAM(s) IntraMuscular once  insulin glargine Injectable (LANTUS) 15 Unit(s) SubCutaneous at bedtime  insulin lispro (ADMELOG) corrective regimen sliding scale   SubCutaneous three times a day before meals  insulin lispro Injectable (ADMELOG) 2 Unit(s) SubCutaneous three times a day before meals  pantoprazole    Tablet 40 milliGRAM(s) Oral before breakfast  senna 2 Tablet(s) Oral at bedtime      MEDICATIONS  (PRN):        REVIEW OF SYSTEMS:                           ALL ROS DONE [ X   ]      CONSTITUTIONAL:  LETHARGIC [   ], FEVER [   ], UNRESPONSIVE [   ]  CVS:  CP  [   ], SOB, [   ], PALPITATIONS [   ], DIZZYNESS [   ]  RS: COUGH [   ], SPUTUM [   ]  GI: ABDOMINAL PAIN [   ], NAUSEA [   ], VOMITINGS [   ], DIARRHEA [   ], CONSTIPATION [   ]  :  DYSURIA [   ], NOCTURIA [   ], INCREASED FREQUENCY [   ], DRIBLING [   ],  SKELETAL: PAINFUL JOINTS [   ], SWOLLEN JOINTS [   ], NECK ACHE [   ], LOW BACK ACHE [   ],  SKIN : ULCERS [   ], RASH [   ], ITCHING [   ]  CNS: HEAD ACHE [   ], DOUBLE VISION [   ], BLURRED VISION [   ], AMS / CONFUSION [   ], SEIZURES [   ], WEAKNESS [   ],TINGLING / NUMBNESS [   ]        PHYSICAL EXAMINATION:    GENERAL APPEARANCE: NO DISTRESS  HEENT:  NO PALLOR, NO  JVD,  NO   NODES, NECK SUPPLE  CVS: S1 +, S2 +,   RS: AEEB,  OCCASIONAL  RALES +,   NO RONCHI  ABD: SOFT, NT, NO, BS +  EXT: NO PE  SKIN: WARM,   SKELETAL:  ROM ACCEPTABLE  CNS:  AAO X  2-3  , NO  DEFICITS        RADIOLOGY :    < from: US Abdomen Upper Quadrant Right (22 @ 12:26) >    IMPRESSION:    Hepatomegaly and hepatic steatosis.  No cholelithiasis or biliary ductal dilatation.    < end of copied text >        ASSESSMENT :     Acute pancreatitis without infection or necrosis    Diabetes        PLAN:  HPI:  31yoM with h/o DMon oral medications (unsure about name and dose), presents generalized though possibly slightly epigastric-predominant abdominal pain x 3 days. Worsened with laying and standing, non-food related per se. Patient reports vomiting episode and persistent nausea.  Also notes constipation (last BM yesterday) and low urine output.   Patient also reports unable to pass flatus and had last BM on 1 day ago.   Patient reports he had similar complaints 2 years ago but it was not this bad and did not require hospitalization.      # DC HOME IN AM ON METFORMIN 500 MG BID, GLIPIZIDE ER 5 MG Q DAILY       # ACUTE PANCREATITIS - RESOLVED     # HYPERTRIGLYCERIDEMIA - IMPROVING  # ALCOHOL ABUSE    # DM2, HYPERGLYCEMIA  - GIVEN IVF, S/P INSULIN DRIP, LANTUS SSI +FS ;  STARTED ON METFORMIN AND LOW-DOSE SULFONYLUREA  - ON GEMFIBROZIL  - COUNSELLED PATIENT ON CESSATION > 10 MINS, SW CONSULT FOR RESOURCES  - TOLERATING CLEAR LIQUID DIET, WILL ADVANCE AS TOLERATED  - DIABETIC EDUCATION  - PRN PAIN CONTROL  - ENDOCRINOLOGY CONSULT        # TRANSAMINITIS  # HEPATIC STEATOSIS  - NOTED RUQ U/S  - MONITORING LFTS  - F/U HEPATITIS PANEL  - HEPATOLOGY CONSULT    # HYPONATREMIA - RESOLVED  # HYPOKALEMIA - REPLETING WITH SUPPLEMENT  # HYPOPHOSPHATEMIA -RESOLVED    # MORBID OBESITY WITH METABOLIC SYNDROME  - COUNSELLED ON NUTRITION AND LIFESTYLE CHANGES    # GI AND DVT PPX    DR. PETE PEMBERTON

## 2022-02-28 LAB
ALBUMIN SERPL ELPH-MCNC: 2.5 G/DL — LOW (ref 3.5–5)
ALP SERPL-CCNC: 158 U/L — HIGH (ref 40–120)
ALT FLD-CCNC: 121 U/L DA — HIGH (ref 10–60)
ANION GAP SERPL CALC-SCNC: 8 MMOL/L — SIGNIFICANT CHANGE UP (ref 5–17)
AST SERPL-CCNC: 63 U/L — HIGH (ref 10–40)
BILIRUB SERPL-MCNC: 0.9 MG/DL — SIGNIFICANT CHANGE UP (ref 0.2–1.2)
BUN SERPL-MCNC: 19 MG/DL — HIGH (ref 7–18)
CALCIUM SERPL-MCNC: 9.3 MG/DL — SIGNIFICANT CHANGE UP (ref 8.4–10.5)
CHLORIDE SERPL-SCNC: 104 MMOL/L — SIGNIFICANT CHANGE UP (ref 96–108)
CO2 SERPL-SCNC: 25 MMOL/L — SIGNIFICANT CHANGE UP (ref 22–31)
CREAT SERPL-MCNC: 0.8 MG/DL — SIGNIFICANT CHANGE UP (ref 0.5–1.3)
GLUCOSE BLDC GLUCOMTR-MCNC: 133 MG/DL — HIGH (ref 70–99)
GLUCOSE BLDC GLUCOMTR-MCNC: 163 MG/DL — HIGH (ref 70–99)
GLUCOSE BLDC GLUCOMTR-MCNC: 167 MG/DL — HIGH (ref 70–99)
GLUCOSE BLDC GLUCOMTR-MCNC: 207 MG/DL — HIGH (ref 70–99)
GLUCOSE SERPL-MCNC: 188 MG/DL — HIGH (ref 70–99)
LIDOCAIN IGE QN: 2166 U/L — HIGH (ref 73–393)
POTASSIUM SERPL-MCNC: 3.9 MMOL/L — SIGNIFICANT CHANGE UP (ref 3.5–5.3)
POTASSIUM SERPL-SCNC: 3.9 MMOL/L — SIGNIFICANT CHANGE UP (ref 3.5–5.3)
PROT SERPL-MCNC: 7.9 G/DL — SIGNIFICANT CHANGE UP (ref 6–8.3)
SODIUM SERPL-SCNC: 137 MMOL/L — SIGNIFICANT CHANGE UP (ref 135–145)

## 2022-02-28 PROCEDURE — 99222 1ST HOSP IP/OBS MODERATE 55: CPT

## 2022-02-28 PROCEDURE — 99233 SBSQ HOSP IP/OBS HIGH 50: CPT

## 2022-02-28 RX ORDER — ACETAMINOPHEN 500 MG
650 TABLET ORAL ONCE
Refills: 0 | Status: COMPLETED | OUTPATIENT
Start: 2022-02-28 | End: 2022-02-28

## 2022-02-28 RX ORDER — KETOROLAC TROMETHAMINE 30 MG/ML
15 SYRINGE (ML) INJECTION ONCE
Refills: 0 | Status: DISCONTINUED | OUTPATIENT
Start: 2022-02-28 | End: 2022-02-28

## 2022-02-28 RX ADMIN — METFORMIN HYDROCHLORIDE 500 MILLIGRAM(S): 850 TABLET ORAL at 06:04

## 2022-02-28 RX ADMIN — Medication 2 UNIT(S): at 08:02

## 2022-02-28 RX ADMIN — Medication 600 MILLIGRAM(S): at 17:24

## 2022-02-28 RX ADMIN — METFORMIN HYDROCHLORIDE 500 MILLIGRAM(S): 850 TABLET ORAL at 17:24

## 2022-02-28 RX ADMIN — ENOXAPARIN SODIUM 40 MILLIGRAM(S): 100 INJECTION SUBCUTANEOUS at 12:23

## 2022-02-28 RX ADMIN — PANTOPRAZOLE SODIUM 40 MILLIGRAM(S): 20 TABLET, DELAYED RELEASE ORAL at 06:04

## 2022-02-28 RX ADMIN — Medication 600 MILLIGRAM(S): at 06:04

## 2022-02-28 RX ADMIN — Medication 2 UNIT(S): at 17:22

## 2022-02-28 RX ADMIN — SENNA PLUS 2 TABLET(S): 8.6 TABLET ORAL at 22:46

## 2022-02-28 RX ADMIN — INSULIN GLARGINE 15 UNIT(S): 100 INJECTION, SOLUTION SUBCUTANEOUS at 22:46

## 2022-02-28 RX ADMIN — Medication 2 UNIT(S): at 12:22

## 2022-02-28 RX ADMIN — Medication 650 MILLIGRAM(S): at 23:48

## 2022-02-28 RX ADMIN — Medication 15 MILLIGRAM(S): at 06:04

## 2022-02-28 RX ADMIN — Medication 2: at 12:22

## 2022-02-28 RX ADMIN — Medication 4: at 07:56

## 2022-02-28 NOTE — CONSULT NOTE ADULT - PROBLEM SELECTOR RECOMMENDATION 9
- Likely due to alcohol. Pt still has abdominal discomfort/pain with nausea  - CT A/P: Moderate peripancreatic fatty stranding and edema consistent with acute interstitial edematous pancreatitis. No pseudocyst formation. Enlarged fatty liver  - U/S: Hepatomegaly and hepatic steatosis. No cholelithiasis or biliary ductal dilatation  - recommend MRCP for further evaluation of unresolved pancreatitis  - trend LFTs - Likely due to alcohol. Pt still has abdominal discomfort/pain with nausea  - CT A/P: Moderate peripancreatic fatty stranding and edema consistent with acute interstitial edematous pancreatitis. No pseudocyst formation. Enlarged fatty liver  - U/S: Hepatomegaly and hepatic steatosis. No cholelithiasis or biliary ductal dilatation  - recommend MRI/MRCP for further evaluation of unresolved pancreatitis  - trend LFTs

## 2022-02-28 NOTE — PROGRESS NOTE ADULT - PROBLEM SELECTOR PLAN 1
- Presented with abdominal pain, elevated lipase  - 2/20 CT abdomen showed moderate peripancreatic fatty stranding and edema consistent with acute interstitial edematous pancreatitis. No pseudocyst formation.  - Believed etiology of acute pancreatitis d/t hypertriglyceridemia and alcoholism   - s/p IVF   - S/p Insulin gtt  - s/p Zosyn course  - Lipase trending back up to 2166 today  - GI Dr. Arreguin consulted  - MRCP ordered per reccs  - f/u am Lipase

## 2022-02-28 NOTE — CONSULT NOTE ADULT - ATTENDING COMMENTS
- Pancreatitis.  - Etoh abuse.  - Elevated LFTs.    Patient seen and examined. In short, pt is a 31M presenting with epigastric pain found to have elevated lipase and acute pancreatitis on CT. First episode of pancreatitis. Significant etoh use, notes 12 beers/day and US/CT unremarkable for any other etiology so suspect etoh induced. GI consulted as lipase rising today with ongoing abd pain (lipase 400s->2000s). Pt states epigastric pain overall improved but persistent, unchanged from yesterday. Mild leukocytosis on labs as well WBC 11. Given ongoing pain and rising lipase, obtain MRI/MRCP to evaluate for any developing acute peripancreatic collections. Diet as tolerated. Pain control PRN. - Pancreatitis.  - Etoh abuse.  - Elevated LFTs.    Patient seen and examined. In short, pt is a 31M presenting with epigastric pain found to have elevated lipase and acute pancreatitis on CT. First episode of pancreatitis. Significant etoh use, notes 12 beers/day and US/CT unremarkable for any other etiology so suspect etoh induced. GI consulted as lipase rising today with ongoing abd pain (lipase 400s->2000s). Pt states epigastric pain overall improved but persistent, unchanged from yesterday. Mild leukocytosis on labs as well WBC 11. Given ongoing pain and rising lipase, obtain MRI/MRCP to evaluate for any developing acute peripancreatic collections. Diet as tolerated. Pain control PRN. Counseled extensively on etoh cessation.

## 2022-02-28 NOTE — CONSULT NOTE ADULT - SUBJECTIVE AND OBJECTIVE BOX
INITIAL GI CONSULTATION    Patient is a 31y old  Male who presents with a chief complaint of Abdominal pain (28 Feb 2022 18:32)    HPI:  31yoM with h/o DMon oral medications (unsure about name and dose), presents generalized though possibly slightly epigastric-predominant abdominal pain x 3 days. Worsened with laying and standing, non-food related per se. Patient reports vomiting episode and persistent nausea.  Also notes constipation (last BM yesterday) and low urine output.   Patient also reports unable to pass flatus and had last BM on 1 day ago.   Patient reports he had similar complaints 2 years ago but it was not this bad and did not require hospitalization.     ID [id number]  GI HPI:  Pt is a 31 year old male with PMHx of DM (oral agent), HLD p/w bilateral lower abdominal pain radiating to epigastric region with associated headache x 3 days prior to admission. Pt Dx with acute pancreatitis and was plan for discharge today. However pt's repeat lipase was elevated to ~2000 from 470s and continue to have diffuse abdominal pain after eating with associated nausea. Pt reports drinking approximately 12 beers per day and usually three times a week; never had pancreatitis in the past. Reports having watery diarrhea x2 yesterday and once today.     EGD/colonoscopy: never      PMH/PSH:  PAST MEDICAL & SURGICAL HISTORY:  Diabetes    Social: From [place]; smokes 1 pack per week.   FH:  FAMILY HISTORY: No family history of pancreatitis, liver or gallbladder problem       MEDS:  MEDICATIONS  (STANDING):  dextrose 5%. 1000 milliLiter(s) (100 mL/Hr) IV Continuous <Continuous>  enoxaparin Injectable 40 milliGRAM(s) SubCutaneous daily  gemfibrozil 600 milliGRAM(s) Oral every 12 hours  glucagon  Injectable 1 milliGRAM(s) IntraMuscular once  insulin glargine Injectable (LANTUS) 15 Unit(s) SubCutaneous at bedtime  insulin lispro (ADMELOG) corrective regimen sliding scale   SubCutaneous three times a day before meals  insulin lispro Injectable (ADMELOG) 2 Unit(s) SubCutaneous three times a day before meals  metFORMIN 500 milliGRAM(s) Oral two times a day  pantoprazole    Tablet 40 milliGRAM(s) Oral before breakfast  senna 2 Tablet(s) Oral at bedtime    MEDICATIONS  (PRN):    Allergies    No Known Allergies    Intolerances            CONSTITUTIONAL:  No weight loss, fever, chills, weakness or fatigue.  HEENT:  Eyes:  No visual loss, blurred vision, double vision or yellow sclerae. Ears, Nose, Throat:  No hearing loss, sneezing, congestion, runny nose or sore throat.  SKIN:  No rash or itching.  CARDIOVASCULAR:  No chest pain, chest pressure or chest discomfort. No palpitations or edema.  RESPIRATORY:  No shortness of breath, cough or sputum.  GASTROINTESTINAL:  SEE HPI  GENITOURINARY:  No dysuria, hematuria, urinary frequency  NEUROLOGICAL:  No headache, dizziness, syncope, paralysis, ataxia, numbness or tingling in the extremities. No change in bowel or bladder control.  MUSCULOSKELETAL:  No muscle, back pain, joint pain or stiffness.  HEMATOLOGIC:  No anemia, bleeding or bruising.  LYMPHATICS:  No enlarged nodes. No history of splenectomy.  PSYCHIATRIC:  No history of depression or anxiety.  ENDOCRINOLOGIC:  No reports of sweating, cold or heat intolerance. No polyuria or polydipsia.      ______________________________________________________________________  PHYSICAL EXAM:  T(C): 36.3 (02-28-22 @ 13:53), Max: 36.4 (02-27-22 @ 19:44)  HR: 73 (02-28-22 @ 13:53)  BP: 119/79 (02-28-22 @ 13:53)  RR: 17 (02-28-22 @ 13:53)  SpO2: 97% (02-28-22 @ 13:53)  Wt(kg): --      GEN: NAD, normocephalic  CVS: S1S2+  CHEST: clear to auscultation  ABD: soft, minimal tenderness, nondistended, bowel sounds present  EXTR: no cyanosis, no clubbing, no edema  NEURO: Awake and alert; oriented x3  SKIN:  warm;  non icteric    ______________________________________________________________________  LABS:                        12.8   11.22 )-----------( 361      ( 27 Feb 2022 15:09 )             38.2     02-28    137  |  104  |  19<H>  ----------------------------<  188<H>  3.9   |  25  |  0.80    Ca    9.3      28 Feb 2022 08:24    TPro  7.9  /  Alb  2.5<L>  /  TBili  0.9  /  DBili  x   /  AST  63<H>  /  ALT  121<H>  /  AlkPhos  158<H>  02-28    LIVER FUNCTIONS - ( 28 Feb 2022 08:24 )  Alb: 2.5 g/dL / Pro: 7.9 g/dL / ALK PHOS: 158 U/L / ALT: 121 U/L DA / AST: 63 U/L / GGT: x             ____________________________________________    IMAGING:    < from: CT Abdomen and Pelvis w/ IV Cont (02.20.22 @ 06:26) >    ACC: [id number] EXAM:  CT ABDOMEN AND PELVIS IC                          PROCEDURE DATE:  02/20/2022      INTERPRETATION:  CLINICAL INFORMATION: Abdominal pain.    COMPARISON: None.    CONTRAST/COMPLICATIONS:  IV Contrast: Omnipaque 350  90 cc administered   10 cc discarded  Oral Contrast: NONE  Complications: None reported at time of study completion      PROCEDURE:  Axial CT images were acquired through the abdomen and pelvis following   intravenous contrast. Coronal and sagittal reformatted images provided..    FINDINGS:  LOWER CHEST: Visualized lung bases demonstrate mild dependent change.  LIVER: Diffuse hepatic steatosis. Enlarged right hepatic lobe.  BILE DUCTS: Normal caliber.  GALLBLADDER: No calcified gallstone.  SPLEEN: Within normal limits.  PANCREAS: There is a moderate peripancreatic fatty stranding and edema   consistent with acute interstitial edematous pancreatitis. There is no   pseudocyst formation.  ADRENALS: Within normal limits.  KIDNEYS/URETERS: Enhance symmetrically without hydronephrosis..    BLADDER: Within normal limits.  REPRODUCTIVE ORGANS: Prostate gland within normal limits. Seminal   vesicles symmetric.    BOWEL: Evaluation of bowel is limited without distention with oral   contrast, however there is no bowel obstruction. Normal appendix without   appendicitis. Small bowel loops are not dilated. Stomach is   underdistended for adequate evaluation.  PERITONEUM: No free air. Small volume scattered ascites..  VESSELS:  Within normal limits.  RETROPERITONEUM: Scattered nonenlarged mesenteric lymph nodes.  ABDOMINAL WALL: Within normal limits.  BONES: Nonspecific sclerotic focus of the right femoral head,   statistically a bone island.    IMPRESSION:    Moderate peripancreatic fatty stranding andedema consistent with acute   interstitial edematous pancreatitis. No pseudocyst formation.    Enlarged fatty liver.  ----------------------------------------------------------  < from: US Abdomen Upper Quadrant Right (02.23.22 @ 12:26) >    ACC: [id number] EXAM:  US ABDOMEN RT UPR QUADRANT                          PROCEDURE DATE:  02/23/2022        INTERPRETATION:  CLINICAL INFORMATION: Elevated bilirubin, pancreatitis    COMPARISON: CT dated 2/20/2022    TECHNIQUE: Sonography of the right upper quadrant.    FINDINGS:    Liver: Increased echogenicity. Enlarged (27.1 cm). Patent hepatic and   portal veins with normal flow direction.  Bile ducts: Normal caliber. Common bile duct measures 5 mm.  Gallbladder: Within normal limits.  Pancreas: Visualized portions are within normal limits.  Right kidney: 13.2 cm. No hydronephrosis.  Ascites: None.  IVC: Visualized portions are within normal limits.    IMPRESSION:    Hepatomegaly and hepatic steatosis.  No cholelithiasis or biliary ductal dilatation.

## 2022-02-28 NOTE — PROGRESS NOTE ADULT - ASSESSMENT
32yo M with h/o DM on Janumet, presented w/ generalized epigastric-predominant abdominal pain x 3 days. Worsened with laying and standing, non-food related per se. Patient reports vomiting episode and persistent nausea. Patient reports he had similar complaints 2 years ago but it was not this bad and did not require hospitalization. In the ED pt was found afebrile, HR 88, /82, Sat 99% on RA. Labs on admission significant for Na 128,  TG 1894, Lipase 4550. CT A/P showed moderate peripancreatic stranding and edema c/w pancreatitis. In the ED pt received 2L NS bolus.  Admitted to ICU for pancreatitis requiring insulin drip.  Downgraded to 4S on 2/24/22.     2/28-Pt. completed Abx therapy with Zosyn, remains afebrile, abdomen  with Lipase trending up to 2166 today.  GI  consulted, MRCP ordered per reccs.

## 2022-02-28 NOTE — PROGRESS NOTE ADULT - ASSESSMENT
32yo obese Male with Hx of DM on Janumet presented to [facility] on 2/20/22 with 3 days Hx of epigastric pain, nausea and one episode of NBNB vomiting, and found to have acute pancreatitis with peripancreatic stranding on CT a/p and lipase 4550, with TG 1894, requiring ICU admission and insulin drip. He was transferred to medical floor 2/24/22. RUQ US 2/23/22 showed hepatomegaly and hepatic steatosis, but normal bile ducts and GB. Mild hyperbilirubinemia downtrending (4.5->1.8), and transaminases improved overall too (->59, AST 73->35), but with slight uptick today (ALT 80, AST 62) with slight ALP elevation (140). Hypoalbuminemia. No INR available.      Acute pancreatitis - improved, but today noted lipase > 2000.   - C/w mgmt. per primary team  - Alcohol and high TG possible the triggers, but if repeated episodes, consider to check IgG4    Mild hyperbilirubinemia - Resolved  Mild transaminase and ALP elevation  - Improved with improvement of pancreatitis, but now up-trending again, parallel with lipase  - RUQ US showing hepatomegaly and hepatic steatosis, obese, diabetic; No biliary pathology on US abd  - Fractionate bilirubin if elevated again  - On gemfibrozil, but it was just started in hospital, unlikely to be responsible  - Discussed importance of alcohol abstinence, diet, exercise, weight loss and compliance with DM treatment (HbA1c 9.5)  - Viral hepatitis panel neg, add HBcAb total, HBsAb, Hep A IgG to assess immunity/exposure and if liver enzymes persistently elevated or up-trending, add additional liver workup, including autoimmune  - C/w monitoring, obtain INR  - Can have elastography outpatient      Thank you for consult  Will continue to follow.   D/w primary team    INCOMPLETE NOTE, FULL NOTE TO FOLLOW       32yo obese Male with Hx of DM on Janumet presented to [facility] on 2/20/22 with 3 days Hx of epigastric pain, nausea and one episode of NBNB vomiting, and found to have acute pancreatitis with peripancreatic stranding on CT a/p and lipase 4550, with TG 1894, requiring ICU admission and insulin drip. He was transferred to medical floor 2/24/22. RUQ US 2/23/22 showed hepatomegaly and hepatic steatosis, but normal bile ducts and GB. Mild hyperbilirubinemia downtrending (4.5->1.8), and transaminases improved overall too (->59, AST 73->35), but with slight uptick since 2/25 again (, AST 63) with slight ALP elevation (158). Hypoalbuminemia. No INR available.      Acute pancreatitis - intially improved, but now symptomatic again and today noted lipase > 2000.   - C/w mgmt. per primary team  - Alcohol and high TG are possible the triggers, but consider repeat imaging, agree with MR/MRCP, especially with pain, tenderness and  if repeated episodes, and above non revealing, consider to check IgG4    Mild hyperbilirubinemia - Resolved  Mild transaminase and ALP elevation  - Improved with improvement of pancreatitis, but now up-trending again, parallel with lipase  - RUQ US showing hepatomegaly and hepatic steatosis, obese, diabetic; No biliary pathology on US abd. Consider / Agree with MR/MRCP if no contraindication.  - Fractionate bilirubin if elevated again  - On gemfibrozil, but it was just started in hospital, unlikely to be responsible  - Discussed importance of alcohol abstinence, diet, exercise, weight loss and compliance with DM treatment (HbA1c 9.5)  - Viral hepatitis panel neg, add HBcAb total, HBsAb, Hep A IgG to assess immunity/exposure and if liver enzymes persistently elevated or up-trending, add additional liver workup, including autoimmune  - C/w monitoring, obtain INR  - Can have elastography outpatient      Thank you for consult  Will continue to follow.   D/w primary team

## 2022-02-28 NOTE — PROGRESS NOTE ADULT - SUBJECTIVE AND OBJECTIVE BOX
NP Note discussed with  Primary Attending    Patient is a 31y old  Male who presents with a chief complaint of Abdominal pain (28 Feb 2022 12:35)      INTERVAL HPI/OVERNIGHT EVENTS: no new complaints    MEDICATIONS  (STANDING):  dextrose 5%. 1000 milliLiter(s) (100 mL/Hr) IV Continuous <Continuous>  enoxaparin Injectable 40 milliGRAM(s) SubCutaneous daily  gemfibrozil 600 milliGRAM(s) Oral every 12 hours  glucagon  Injectable 1 milliGRAM(s) IntraMuscular once  insulin glargine Injectable (LANTUS) 15 Unit(s) SubCutaneous at bedtime  insulin lispro (ADMELOG) corrective regimen sliding scale   SubCutaneous three times a day before meals  insulin lispro Injectable (ADMELOG) 2 Unit(s) SubCutaneous three times a day before meals  metFORMIN 500 milliGRAM(s) Oral two times a day  pantoprazole    Tablet 40 milliGRAM(s) Oral before breakfast  senna 2 Tablet(s) Oral at bedtime    MEDICATIONS  (PRN):      __________________________________________________  REVIEW OF SYSTEMS:    CONSTITUTIONAL: No fever,   EYES: no acute visual disturbances  NECK: No pain or stiffness  RESPIRATORY: No cough; No shortness of breath  CARDIOVASCULAR: No chest pain, no palpitations  GASTROINTESTINAL: No pain. No nausea or vomiting; No diarrhea   NEUROLOGICAL: No headache or numbness, no tremors  MUSCULOSKELETAL: No joint pain, no muscle pain  GENITOURINARY: no dysuria, no frequency, no hesitancy  PSYCHIATRY: no depression , no anxiety  ALL OTHER  ROS negative        Vital Signs Last 24 Hrs  T(C): 36.3 (28 Feb 2022 13:53), Max: 36.4 (27 Feb 2022 19:44)  T(F): 97.4 (28 Feb 2022 13:53), Max: 97.6 (27 Feb 2022 19:44)  HR: 73 (28 Feb 2022 13:53) (73 - 77)  BP: 119/79 (28 Feb 2022 13:53) (119/79 - 135/82)  BP(mean): --  RR: 17 (28 Feb 2022 13:53) (17 - 18)  SpO2: 97% (28 Feb 2022 13:53) (97% - 100%)    ________________________________________________  PHYSICAL EXAM:  obese, well groomed, mild distress  GENERAL: NAD  HEENT: Normocephalic;  conjunctivae and sclerae clear; moist mucous membranes;   NECK : supple  CHEST/LUNG: Clear to auscultation bilaterally with good air entry   HEART: S1 S2  regular; no murmurs, gallops or rubs  ABDOMEN: Soft, tender, Nondistended; Bowel sounds present  EXTREMITIES: no cyanosis; no edema; no calf tenderness  SKIN: warm and dry; no rash  NERVOUS SYSTEM:  Awake and alert; Oriented  to place, person and time ; no new deficits    _________________________________________________  LABS:                        12.8   11.22 )-----------( 361      ( 27 Feb 2022 15:09 )             38.2     02-28    137  |  104  |  19<H>  ----------------------------<  188<H>  3.9   |  25  |  0.80    Ca    9.3      28 Feb 2022 08:24    TPro  7.9  /  Alb  2.5<L>  /  TBili  0.9  /  DBili  x   /  AST  63<H>  /  ALT  121<H>  /  AlkPhos  158<H>  02-28    Lipase, Serum: 4550 U/L (02.20.22 @ 05:14)    Lipase, Serum (02.21.22 @ 01:23)    Lipase, Serum: 1691 U/L    Lipase, Serum (02.21.22 @ 05:02)    Lipase, Serum: 1181 U/L    Lipase, Serum (02.22.22 @ 04:30)    Lipase, Serum: 388 U/L    Lipase, Serum (02.23.22 @ 04:47)    Lipase, Serum: 474 U/L    Lipase, Serum in AM (02.28.22 @ 08:24)    Lipase, Serum: 2166 U/L        CAPILLARY BLOOD GLUCOSE      POCT Blood Glucose.: 163 mg/dL (28 Feb 2022 11:39)  POCT Blood Glucose.: 207 mg/dL (28 Feb 2022 07:33)  POCT Blood Glucose.: 228 mg/dL (27 Feb 2022 22:37)  POCT Blood Glucose.: 273 mg/dL (27 Feb 2022 20:55)    RADIOLOGY & ADDITIONAL TESTS:  < from: CT Abdomen and Pelvis w/ IV Cont (02.20.22 @ 06:26) >    ACC: [id number] EXAM:  CT ABDOMEN AND PELVIS IC                          PROCEDURE DATE:  02/20/2022          INTERPRETATION:  CLINICAL INFORMATION: Abdominal pain.    COMPARISON: None.    CONTRAST/COMPLICATIONS:  IV Contrast: Omnipaque 350  90 cc administered   10 cc discarded  Oral Contrast: NONE  Complications: None reported at time of study completion      PROCEDURE:  Axial CT images were acquired through the abdomen and pelvis following   intravenous contrast. Coronal and sagittal reformatted images provided..    FINDINGS:  LOWER CHEST: Visualized lung bases demonstrate mild dependent change.  LIVER: Diffuse hepatic steatosis. Enlarged right hepatic lobe.  BILE DUCTS: Normal caliber.  GALLBLADDER: No calcified gallstone.  SPLEEN: Within normal limits.  PANCREAS: There is a moderate peripancreatic fatty stranding and edema   consistent with acute interstitial edematous pancreatitis. There is no   pseudocyst formation.  ADRENALS: Within normal limits.  KIDNEYS/URETERS: Enhance symmetrically without hydronephrosis..    BLADDER: Within normal limits.  REPRODUCTIVE ORGANS: Prostate gland within normal limits. Seminal   vesicles symmetric.    BOWEL: Evaluation of bowel is limited without distention with oral   contrast, however there is no bowel obstruction. Normal appendix without   appendicitis. Small bowel loops are not dilated. Stomach is   underdistended for adequate evaluation.  PERITONEUM: No free air. Small volume scattered ascites..  VESSELS:  Within normal limits.  RETROPERITONEUM: Scattered nonenlarged mesenteric lymph nodes.  ABDOMINAL WALL: Within normal limits.  BONES: Nonspecific sclerotic focus of the right femoral head,   statistically a bone island.    IMPRESSION:    Moderate peripancreatic fatty stranding and edema consistent with acute   interstitial edematous pancreatitis. No pseudocyst formation.    Enlarged fatty liver.    --- End of Report ---    < end of copied text >    < from: Xray Chest 1 View- PORTABLE-Urgent (Xray Chest 1 View- PORTABLE-Urgent .) (02.21.22 @ 00:56) >    ACC: [id number] EXAM:  XR CHEST PORTABLE URGENT 1V                          PROCEDURE DATE:  02/21/2022          INTERPRETATION:  CLINICAL INDICATION: 31 years  Male with Sepsis.    COMPARISON: 4/10/2020    AP view of the chest demonstrates the lungsto be clear. There is no   pleural effusion. There is no pneumothorax.    The heart, mediastinum and mauro cannot be assessed due to projection. .    The pulmonary vasculature is normal.    The osseous structures are unremarkable.    IMPRESSION:    No acute infiltrate.    --- End of Report ---    < end of copied text >    < from: US Abdomen Upper Quadrant Right (02.23.22 @ 12:26) >    ACC: [id number] EXAM:  US ABDOMEN RT UPR QUADRANT                          PROCEDURE DATE:  02/23/2022          INTERPRETATION:  CLINICAL INFORMATION: Elevated bilirubin, pancreatitis    COMPARISON: CT dated 2/20/2022    TECHNIQUE: Sonography of the right upper quadrant.    FINDINGS:    Liver: Increased echogenicity. Enlarged (27.1 cm). Patent hepatic and   portal veins with normal flow direction.  Bile ducts: Normal caliber. Common bile duct measures 5 mm.  Gallbladder: Within normal limits.  Pancreas: Visualized portions are within normal limits.  Right kidney: 13.2 cm. No hydronephrosis.  Ascites: None.  IVC: Visualized portions are within normal limits.    IMPRESSION:    Hepatomegaly and hepatic steatosis.  No cholelithiasis or biliary ductal dilatation.        --- End of Report ---    < end of copied text >    Imaging Personally Reviewed:  YES/NO    Consultant(s) Notes Reviewed:   YES/ No    Care Discussed with Consultants : GI    Plan of care was discussed with patient and /or primary care giver; all questions and concerns were addressed and care was aligned with patient's wishes.

## 2022-02-28 NOTE — PROGRESS NOTE ADULT - SUBJECTIVE AND OBJECTIVE BOX
`Patient is a 31y old  Male who presents with a chief complaint of Abdominal pain (28 Feb 2022 16:41)    PATIENT IS SEEN AND EXAMINED IN MEDICAL FLOOR.  NGT [    ]    MONA [   ]      GT [   ]    ALLERGIES:  No Known Allergies      Daily     Daily     VITALS:    Vital Signs Last 24 Hrs  T(C): 36.3 (28 Feb 2022 13:53), Max: 36.4 (27 Feb 2022 19:44)  T(F): 97.4 (28 Feb 2022 13:53), Max: 97.6 (27 Feb 2022 19:44)  HR: 73 (28 Feb 2022 13:53) (73 - 77)  BP: 119/79 (28 Feb 2022 13:53) (119/79 - 135/82)  BP(mean): --  RR: 17 (28 Feb 2022 13:53) (17 - 18)  SpO2: 97% (28 Feb 2022 13:53) (97% - 100%)    LABS:    CBC Full  -  ( 27 Feb 2022 15:09 )  WBC Count : 11.22 K/uL  RBC Count : 4.72 M/uL  Hemoglobin : 12.8 g/dL  Hematocrit : 38.2 %  Platelet Count - Automated : 361 K/uL  Mean Cell Volume : 80.9 fl  Mean Cell Hemoglobin : 27.1 pg  Mean Cell Hemoglobin Concentration : 33.5 gm/dL  Auto Neutrophil # : x  Auto Lymphocyte # : x  Auto Monocyte # : x  Auto Eosinophil # : x  Auto Basophil # : x  Auto Neutrophil % : x  Auto Lymphocyte % : x  Auto Monocyte % : x  Auto Eosinophil % : x  Auto Basophil % : x      02-28    137  |  104  |  19<H>  ----------------------------<  188<H>  3.9   |  25  |  0.80    Ca    9.3      28 Feb 2022 08:24    TPro  7.9  /  Alb  2.5<L>  /  TBili  0.9  /  DBili  x   /  AST  63<H>  /  ALT  121<H>  /  AlkPhos  158<H>  02-28    CAPILLARY BLOOD GLUCOSE      POCT Blood Glucose.: 133 mg/dL (28 Feb 2022 16:52)  POCT Blood Glucose.: 163 mg/dL (28 Feb 2022 11:39)  POCT Blood Glucose.: 207 mg/dL (28 Feb 2022 07:33)  POCT Blood Glucose.: 228 mg/dL (27 Feb 2022 22:37)  POCT Blood Glucose.: 273 mg/dL (27 Feb 2022 20:55)        LIVER FUNCTIONS - ( 28 Feb 2022 08:24 )  Alb: 2.5 g/dL / Pro: 7.9 g/dL / ALK PHOS: 158 U/L / ALT: 121 U/L DA / AST: 63 U/L / GGT: x           Creatinine Trend: 0.80<--, 0.78<--, 0.68<--, 0.69<--, 0.52<--, 0.54<--  I&O's Summary          .Blood Blood-Peripheral  02-21 @ 06:27   No Growth Final  --  --      Clean Catch Clean Catch (Midstream)  02-20 @ 12:33   No growth  --  --          MEDICATIONS:    MEDICATIONS  (STANDING):  dextrose 5%. 1000 milliLiter(s) (100 mL/Hr) IV Continuous <Continuous>  enoxaparin Injectable 40 milliGRAM(s) SubCutaneous daily  gemfibrozil 600 milliGRAM(s) Oral every 12 hours  glucagon  Injectable 1 milliGRAM(s) IntraMuscular once  insulin glargine Injectable (LANTUS) 15 Unit(s) SubCutaneous at bedtime  insulin lispro (ADMELOG) corrective regimen sliding scale   SubCutaneous three times a day before meals  insulin lispro Injectable (ADMELOG) 2 Unit(s) SubCutaneous three times a day before meals  metFORMIN 500 milliGRAM(s) Oral two times a day  pantoprazole    Tablet 40 milliGRAM(s) Oral before breakfast  senna 2 Tablet(s) Oral at bedtime      MEDICATIONS  (PRN):      REVIEW OF SYSTEMS:                           ALL ROS DONE [ X   ]    CONSTITUTIONAL:  LETHARGIC [   ], FEVER [   ], UNRESPONSIVE [   ]  CVS:  CP  [   ], SOB, [   ], PALPITATIONS [   ], DIZZYNESS [   ]  RS: COUGH [   ], SPUTUM [   ]  GI: ABDOMINAL PAIN [   ], NAUSEA [   ], VOMITINGS [   ], DIARRHEA [   ], CONSTIPATION [   ]  :  DYSURIA [   ], NOCTURIA [   ], INCREASED FREQUENCY [   ], DRIBLING [   ],  SKELETAL: PAINFUL JOINTS [   ], SWOLLEN JOINTS [   ], NECK ACHE [   ], LOW BACK ACHE [   ],  SKIN : ULCERS [   ], RASH [   ], ITCHING [   ]  CNS: HEAD ACHE [   ], DOUBLE VISION [   ], BLURRED VISION [   ], AMS / CONFUSION [   ], SEIZURES [   ], WEAKNESS [   ],TINGLING / NUMBNESS [   ]    PHYSICAL EXAMINATION:    GENERAL APPEARANCE: NO DISTRESS  HEENT:  NO PALLOR, NO  JVD,  NO   NODES, NECK SUPPLE  CVS: S1 +, S2 +,   RS: AEEB,  OCCASIONAL  RALES +,   NO RONCHI  ABD: SOFT, NT, NO, BS +  EXT: NO PE  SKIN: WARM,   SKELETAL:  ROM ACCEPTABLE  CNS:  AAO X  2-3  , NO  DEFICITS        RADIOLOGY :    < from: US Abdomen Upper Quadrant Right (02.23.22 @ 12:26) >    IMPRESSION:    Hepatomegaly and hepatic steatosis.  No cholelithiasis or biliary ductal dilatation.    < end of copied text >        ASSESSMENT :     Acute pancreatitis without infection or necrosis    Diabetes        PLAN:  HPI:  31yoM with h/o DMon oral medications (unsure about name and dose), presents generalized though possibly slightly epigastric-predominant abdominal pain x 3 days. Worsened with laying and standing, non-food related per se. Patient reports vomiting episode and persistent nausea.  Also notes constipation (last BM yesterday) and low urine output.   Patient also reports unable to pass flatus and had last BM on 1 day ago.   Patient reports he had similar complaints 2 years ago but it was not this bad and did not require hospitalization.          # ACUTE PANCREATITIS  -- PAIN ONCE MORE AFTER STARTING SOLID DIET  # HYPERTRIGLYCERIDEMIA - IMPROVING  # ALCOHOL ABUSE  # DM2, HYPERGLYCEMIA  - GIVEN IVF, S/P INSULIN DRIP, LANTUS SSI +FS ;  STARTED ON METFORMIN AND LOW-DOSE SULFONYLUREA  - ON GEMFIBROZIL  - COUNSELLED PATIENT ON CESSATION > 10 MINS, SW CONSULT FOR RESOURCES  - ON SOLID DIET  - DIABETIC EDUCATION  - PRN PAIN CONTROL  - ENDOCRINOLOGY CONSULT    - GASTROENTEROLOGY CONSULT - RECOMMEND MRCP - F/U RESULTS      # TRANSAMINITIS  # HEPATIC STEATOSIS  - NOTED RUQ U/S  - MONITORING LFTS  - F/U HEPATITIS PANEL  - HEPATOLOGY CONSULT    # HYPONATREMIA - RESOLVED  # HYPOKALEMIA - REPLETING WITH SUPPLEMENT  # HYPOPHOSPHATEMIA -RESOLVED    # MORBID OBESITY WITH METABOLIC SYNDROME  - COUNSELLED ON NUTRITION AND LIFESTYLE CHANGES    # GI AND DVT PPX

## 2022-02-28 NOTE — PROGRESS NOTE ADULT - SUBJECTIVE AND OBJECTIVE BOX
Time of Visit:  Patient seen and examined.     MEDICATIONS  (STANDING):  dextrose 5%. 1000 milliLiter(s) (100 mL/Hr) IV Continuous <Continuous>  enoxaparin Injectable 40 milliGRAM(s) SubCutaneous daily  gemfibrozil 600 milliGRAM(s) Oral every 12 hours  glucagon  Injectable 1 milliGRAM(s) IntraMuscular once  insulin glargine Injectable (LANTUS) 15 Unit(s) SubCutaneous at bedtime  insulin lispro (ADMELOG) corrective regimen sliding scale   SubCutaneous three times a day before meals  insulin lispro Injectable (ADMELOG) 2 Unit(s) SubCutaneous three times a day before meals  metFORMIN 500 milliGRAM(s) Oral two times a day  pantoprazole    Tablet 40 milliGRAM(s) Oral before breakfast  senna 2 Tablet(s) Oral at bedtime      MEDICATIONS  (PRN):       Medications up to date at time of exam.    ROS; No fever, chills, cough, congestion.   PHYSICAL EXAMINATION:    Vital Signs Last 24 Hrs  T(C): 36.3 (28 Feb 2022 13:53), Max: 36.4 (27 Feb 2022 19:44)  T(F): 97.4 (28 Feb 2022 13:53), Max: 97.6 (27 Feb 2022 19:44)  HR: 73 (28 Feb 2022 13:53) (73 - 77)  BP: 119/79 (28 Feb 2022 13:53) (119/79 - 135/82)  BP(mean): --  RR: 17 (28 Feb 2022 13:53) (17 - 18)  SpO2: 97% (28 Feb 2022 13:53) (97% - 100%)   (if applicable)    General: Alert and oriented. Able to answer question with no SOB. Morbid Obese. No acute distress.     HEENT: Head is normocephalic and atraumatic. No nasal tenderness. Extraocular muscles are intact. Mucous membranes are moist.     NECK: Supple, no palpable adenopathy.    LUNGS: Clear to auscultation bilaterally with no wheezing, rales, or rhonchi. No use of accessory muscle.     HEART: S1 S2 Regular rate and no click/ rub.     ABDOMEN: Soft, nontender.  Active bowel sounds. No abdominal guarding .    : No bladder distention and tenderness.     EXTREMITIES: Without any cyanosis, clubbing, rash, lesions or edema.    NEUROLOGIC: Awake, alert, oriented.     SKIN: Warm and moist. Non diaphoretic.       LABS:                        12.8   11.22 )-----------( 361      ( 27 Feb 2022 15:09 )             38.2     02-28    137  |  104  |  19<H>  ----------------------------<  188<H>  3.9   |  25  |  0.80    Ca    9.3      28 Feb 2022 08:24    TPro  7.9  /  Alb  2.5<L>  /  TBili  0.9  /  DBili  x   /  AST  63<H>  /  ALT  121<H>  /  AlkPhos  158<H>  02-28      MICROBIOLOGY: (if applicable)    RADIOLOGY & ADDITIONAL STUDIES:  EKG:   CXR:  ECHO:    IMPRESSION: This is a 30 Y/O  Male presented to ED with  generalized epigastric-predominant abdominal pain x 3 days. Worsened with laying and standing, non-food related . Patient reports he had similar complaints 2 years ago but it was not this bad and did not require hospitalization. Was admitted to ICU for Pancreatitis requiring Insulin Drip. Endocrinology Dr. Reyes consulted, recommended that pt should not continue janumet on discharge, could be managed with  metformin and low dose sulfonylurea upon d/c. Due to  elevated Tbil, RUQ was ordered, which showed hepatomegalia w/ hepatic steatosis. It did not show evidence of cholelythiasis or biliary duct dilation. His SOB resolved and so far saturating good room air. 02-27-22,02-20-22 Nasal swab Negative PCR for Covid 19.      Impression;  O2 saturation 98% room air. So far saturating good room air.   Oral hygiene care.  Monitor blood sugar with coverage   Reinforced the importance of smoking cessation.  DVT / GI prophylactic. On Lovenox 40 mg SQ Daily.          Time of Visit:  Patient seen and examined.     MEDICATIONS  (STANDING):  dextrose 5%. 1000 milliLiter(s) (100 mL/Hr) IV Continuous <Continuous>  enoxaparin Injectable 40 milliGRAM(s) SubCutaneous daily  gemfibrozil 600 milliGRAM(s) Oral every 12 hours  glucagon  Injectable 1 milliGRAM(s) IntraMuscular once  insulin glargine Injectable (LANTUS) 15 Unit(s) SubCutaneous at bedtime  insulin lispro (ADMELOG) corrective regimen sliding scale   SubCutaneous three times a day before meals  insulin lispro Injectable (ADMELOG) 2 Unit(s) SubCutaneous three times a day before meals  metFORMIN 500 milliGRAM(s) Oral two times a day  pantoprazole    Tablet 40 milliGRAM(s) Oral before breakfast  senna 2 Tablet(s) Oral at bedtime      MEDICATIONS  (PRN):       Medications up to date at time of exam.    ROS; No fever, chills, cough, congestion.   PHYSICAL EXAMINATION:    Vital Signs Last 24 Hrs  T(C): 36.3 (28 Feb 2022 13:53), Max: 36.4 (27 Feb 2022 19:44)  T(F): 97.4 (28 Feb 2022 13:53), Max: 97.6 (27 Feb 2022 19:44)  HR: 73 (28 Feb 2022 13:53) (73 - 77)  BP: 119/79 (28 Feb 2022 13:53) (119/79 - 135/82)  BP(mean): --  RR: 17 (28 Feb 2022 13:53) (17 - 18)  SpO2: 97% (28 Feb 2022 13:53) (97% - 100%)   (if applicable)    General: Alert and oriented. Able to answer question with no SOB. Morbid Obese. No acute distress.     HEENT: Head is normocephalic and atraumatic. No nasal tenderness. Extraocular muscles are intact. Mucous membranes are moist.     NECK: Supple, no palpable adenopathy.    LUNGS: Clear to auscultation bilaterally with no wheezing, rales, or rhonchi. No use of accessory muscle.     HEART: S1 S2 Regular rate and no click/ rub.     ABDOMEN: Soft, nontender.  Active bowel sounds. No abdominal guarding .    : No bladder distention and tenderness.     EXTREMITIES: Without any cyanosis, clubbing, rash, lesions or edema.    NEUROLOGIC: Awake, alert, oriented.     SKIN: Warm and moist. Non diaphoretic.       LABS:                        12.8   11.22 )-----------( 361      ( 27 Feb 2022 15:09 )             38.2     02-28    137  |  104  |  19<H>  ----------------------------<  188<H>  3.9   |  25  |  0.80    Ca    9.3      28 Feb 2022 08:24    TPro  7.9  /  Alb  2.5<L>  /  TBili  0.9  /  DBili  x   /  AST  63<H>  /  ALT  121<H>  /  AlkPhos  158<H>  02-28      MICROBIOLOGY: (if applicable)    RADIOLOGY & ADDITIONAL STUDIES:  EKG:   CXR:  ECHO:    IMPRESSION: This is a 32 Y/O  Male presented to ED with  generalized epigastric-predominant abdominal pain x 3 days. Worsened with laying and standing, non-food related . Patient reports he had similar complaints 2 years ago but it was not this bad and did not require hospitalization. Was admitted to ICU for Pancreatitis requiring Insulin Drip. Endocrinology Dr. Reyes consulted, recommended that pt should not continue janumet on discharge, could be managed with  metformin and low dose sulfonylurea upon d/c. Due to  elevated Tbil, RUQ was ordered, which showed hepatomegalia w/ hepatic steatosis. It did not show evidence of cholelythiasis or biliary duct dilation. His SOB resolved and so far saturating good room air. 02-27-22,02-20-22 Nasal swab Negative PCR for Covid 19.      Impression;  O2 saturation 98% room air. So far saturating good room air.   Oral hygiene care.  Monitor blood sugar with coverage   Reinforced the importance of smoking cessation.  DVT / GI prophylactic. On Lovenox 40 mg SQ Daily.       Agree with above assessment and plan as transcribed.

## 2022-02-28 NOTE — PROGRESS NOTE ADULT - SUBJECTIVE AND OBJECTIVE BOX
Chief Complaint:  Patient is a 31y old  Male who presents with a chief complaint of Abdominal pain (27 Feb 2022 18:13)      Reason for consult:    Interval Events:   Remains afebrile (had 99.6F on 2/26/22), and hemodynamically stable.   Transaminases slightly up-trending and lipase today >2000.  Bilirubin normalized.     Hospital Medications:  dextrose 5%. 1000 milliLiter(s) IV Continuous <Continuous>  enoxaparin Injectable 40 milliGRAM(s) SubCutaneous daily  gemfibrozil 600 milliGRAM(s) Oral every 12 hours  glucagon  Injectable 1 milliGRAM(s) IntraMuscular once  insulin glargine Injectable (LANTUS) 15 Unit(s) SubCutaneous at bedtime  insulin lispro (ADMELOG) corrective regimen sliding scale   SubCutaneous three times a day before meals  insulin lispro Injectable (ADMELOG) 2 Unit(s) SubCutaneous three times a day before meals  metFORMIN 500 milliGRAM(s) Oral two times a day  pantoprazole    Tablet 40 milliGRAM(s) Oral before breakfast  senna 2 Tablet(s) Oral at bedtime      ROS:   General:  No  fevers, chills, night sweats, fatigue  Eyes:  Good vision, no reported pain  ENT:  No sore throat, pain, runny nose  CV:  No pain, palpitations  Pulm:  No dyspnea, cough  GI:  See HPI, otherwise negative  :  No  incontinence, nocturia  Muscle:  No pain, weakness  Neuro:  No memory problems  Psych:  No insomnia, mood problems, depression  Endocrine:  No polyuria, polydipsia, cold/heat intolerance  Heme:  No petechiae, ecchymosis, easy bruisability  Skin:  No rash    PHYSICAL EXAM:   Vital Signs:  Vital Signs Last 24 Hrs  T(C): 36.2 (28 Feb 2022 05:00), Max: 36.4 (27 Feb 2022 19:44)  T(F): 97.2 (28 Feb 2022 05:00), Max: 97.6 (27 Feb 2022 19:44)  HR: 73 (28 Feb 2022 05:00) (73 - 89)  BP: 135/82 (28 Feb 2022 05:00) (133/81 - 139/79)  BP(mean): --  RR: 18 (28 Feb 2022 05:00) (18 - 18)  SpO2: 97% (28 Feb 2022 05:00) (97% - 100%)  Daily     Daily     GENERAL: no acute distress  NEURO: alert, no asterixis  HEENT: anicteric sclera, no conjunctival pallor appreciated  CHEST: no respiratory distress, no accessory muscle use  CARDIAC: regular rate, rhythm  ABDOMEN: soft, non-tender, non-distended, no rebound or guarding  EXTREMITIES: warm, well perfused, no edema  SKIN: no lesions noted    LABS: reviewed                        12.8   11.22 )-----------( 361      ( 27 Feb 2022 15:09 )             38.2     02-28    137  |  104  |  19<H>  ----------------------------<  188<H>  3.9   |  25  |  0.80    Ca    9.3      28 Feb 2022 08:24    TPro  7.9  /  Alb  2.5<L>  /  TBili  0.9  /  DBili  x   /  AST  63<H>  /  ALT  121<H>  /  AlkPhos  158<H>  02-28    LIVER FUNCTIONS - ( 28 Feb 2022 08:24 )  Alb: 2.5 g/dL / Pro: 7.9 g/dL / ALK PHOS: 158 U/L / ALT: 121 U/L DA / AST: 63 U/L / GGT: x             Interval Diagnostic Studies: see sunrise for full report   Chief Complaint:  Patient is a 31y old  Male who presents with a chief complaint of Abdominal pain (27 Feb 2022 18:13)      Reason for consult: Elevated liver enzymes    Interval Events:   Remains afebrile (had 99.6F on 2/26/22), and hemodynamically stable.   Transaminases slightly up-trending and lipase today >2000.  Bilirubin normalized.     Hospital Medications:  dextrose 5%. 1000 milliLiter(s) IV Continuous <Continuous>  enoxaparin Injectable 40 milliGRAM(s) SubCutaneous daily  gemfibrozil 600 milliGRAM(s) Oral every 12 hours  glucagon  Injectable 1 milliGRAM(s) IntraMuscular once  insulin glargine Injectable (LANTUS) 15 Unit(s) SubCutaneous at bedtime  insulin lispro (ADMELOG) corrective regimen sliding scale   SubCutaneous three times a day before meals  insulin lispro Injectable (ADMELOG) 2 Unit(s) SubCutaneous three times a day before meals  metFORMIN 500 milliGRAM(s) Oral two times a day  pantoprazole    Tablet 40 milliGRAM(s) Oral before breakfast  senna 2 Tablet(s) Oral at bedtime      ROS:   General:  No  fevers, chills, night sweats, fatigue  Eyes:  Good vision, no reported pain  ENT:  No sore throat, pain, runny nose  CV:  No pain, palpitations  Pulm:  No dyspnea, cough  GI:  See HPI, otherwise negative  :  No  incontinence, nocturia  Muscle:  No pain, weakness  Neuro:  No memory problems  Psych:  No insomnia, mood problems, depression  Endocrine:  No polyuria, polydipsia, cold/heat intolerance  Heme:  No petechiae, ecchymosis, easy bruisability  Skin:  No rash    PHYSICAL EXAM:   Vital Signs:  Vital Signs Last 24 Hrs  T(C): 36.2 (28 Feb 2022 05:00), Max: 36.4 (27 Feb 2022 19:44)  T(F): 97.2 (28 Feb 2022 05:00), Max: 97.6 (27 Feb 2022 19:44)  HR: 73 (28 Feb 2022 05:00) (73 - 89)  BP: 135/82 (28 Feb 2022 05:00) (133/81 - 139/79)  BP(mean): --  RR: 18 (28 Feb 2022 05:00) (18 - 18)  SpO2: 97% (28 Feb 2022 05:00) (97% - 100%)  Daily     Daily     GENERAL: no acute distress  NEURO: alert, no asterixis  HEENT: anicteric sclera, no conjunctival pallor appreciated  CHEST: no respiratory distress, no accessory muscle use  CARDIAC: regular rate, rhythm  ABDOMEN: soft, non-tender, non-distended, no rebound or guarding  EXTREMITIES: warm, well perfused, no edema  SKIN: no lesions noted    LABS: reviewed                        12.8   11.22 )-----------( 361      ( 27 Feb 2022 15:09 )             38.2     02-28    137  |  104  |  19<H>  ----------------------------<  188<H>  3.9   |  25  |  0.80    Ca    9.3      28 Feb 2022 08:24    TPro  7.9  /  Alb  2.5<L>  /  TBili  0.9  /  DBili  x   /  AST  63<H>  /  ALT  121<H>  /  AlkPhos  158<H>  02-28    LIVER FUNCTIONS - ( 28 Feb 2022 08:24 )  Alb: 2.5 g/dL / Pro: 7.9 g/dL / ALK PHOS: 158 U/L / ALT: 121 U/L DA / AST: 63 U/L / GGT: x             Interval Diagnostic Studies: see sunrise for full report   Chief Complaint:  Patient is a 31y old  Male who presents with a chief complaint of Abdominal pain (27 Feb 2022 18:13)      Reason for consult: Elevated liver enzymes    Interval Events:   Patient was seen and examined at bedside. Remains afebrile (had 99.6F on 2/26/22), and hemodynamically stable, but transaminases slightly up-trending and lipase today >2000. Furthermore, again c/o abdominal (epigastric and RUQ) pain. Bilirubin normalized.     Hospital Medications:  dextrose 5%. 1000 milliLiter(s) IV Continuous <Continuous>  enoxaparin Injectable 40 milliGRAM(s) SubCutaneous daily  gemfibrozil 600 milliGRAM(s) Oral every 12 hours  glucagon  Injectable 1 milliGRAM(s) IntraMuscular once  insulin glargine Injectable (LANTUS) 15 Unit(s) SubCutaneous at bedtime  insulin lispro (ADMELOG) corrective regimen sliding scale   SubCutaneous three times a day before meals  insulin lispro Injectable (ADMELOG) 2 Unit(s) SubCutaneous three times a day before meals  metFORMIN 500 milliGRAM(s) Oral two times a day  pantoprazole    Tablet 40 milliGRAM(s) Oral before breakfast  senna 2 Tablet(s) Oral at bedtime      ROS:   General:  No  fevers, chills  CV:  No pain, palpitations  Pulm:  No dyspnea, cough  GI:  Again has epigastric and RUQ pain, no N/V, eating small amount soft food, had 1 BM, but reports that it was watery, denies bleeding  :  No  dysuria  Muscle:  No pain, weakness  Neuro:  No memory problems  Skin:  No rash    PHYSICAL EXAM:   Vital Signs:  Vital Signs Last 24 Hrs  T(C): 36.2 (28 Feb 2022 05:00), Max: 36.4 (27 Feb 2022 19:44)  T(F): 97.2 (28 Feb 2022 05:00), Max: 97.6 (27 Feb 2022 19:44)  HR: 73 (28 Feb 2022 05:00) (73 - 89)  BP: 135/82 (28 Feb 2022 05:00) (133/81 - 139/79)  BP(mean): --  RR: 18 (28 Feb 2022 05:00) (18 - 18)  SpO2: 97% (28 Feb 2022 05:00) (97% - 100%)  Daily     Daily     GENERAL: no acute distress  NEURO: AAOx3, no asterixis  HEENT: anicteric sclera, no conjunctival pallor appreciated  CHEST: no respiratory distress, no accessory muscle use  CARDIAC: regular rate, rhythm  ABDOMEN: soft, obese, mildly distended, no rebound or guarding, epigastric and RUQ tenderness, neg Brewer, BS+  EXTREMITIES: warm, well perfused, no edema  SKIN: no lesions noted    LABS: reviewed                        12.8   11.22 )-----------( 361      ( 27 Feb 2022 15:09 )             38.2     02-28    137  |  104  |  19<H>  ----------------------------<  188<H>  3.9   |  25  |  0.80    Ca    9.3      28 Feb 2022 08:24    TPro  7.9  /  Alb  2.5<L>  /  TBili  0.9  /  DBili  x   /  AST  63<H>  /  ALT  121<H>  /  AlkPhos  158<H>  02-28    LIVER FUNCTIONS - ( 28 Feb 2022 08:24 )  Alb: 2.5 g/dL / Pro: 7.9 g/dL / ALK PHOS: 158 U/L / ALT: 121 U/L DA / AST: 63 U/L / GGT: x             Interval Diagnostic Studies: see sunrise for full report

## 2022-03-01 LAB
ALBUMIN SERPL ELPH-MCNC: 2.9 G/DL — LOW (ref 3.5–5)
ALP SERPL-CCNC: 153 U/L — HIGH (ref 40–120)
ALT FLD-CCNC: 127 U/L DA — HIGH (ref 10–60)
ANION GAP SERPL CALC-SCNC: 10 MMOL/L — SIGNIFICANT CHANGE UP (ref 5–17)
AST SERPL-CCNC: 79 U/L — HIGH (ref 10–40)
BILIRUB SERPL-MCNC: 0.7 MG/DL — SIGNIFICANT CHANGE UP (ref 0.2–1.2)
BUN SERPL-MCNC: 16 MG/DL — SIGNIFICANT CHANGE UP (ref 7–18)
CALCIUM SERPL-MCNC: 9.9 MG/DL — SIGNIFICANT CHANGE UP (ref 8.4–10.5)
CHLORIDE SERPL-SCNC: 103 MMOL/L — SIGNIFICANT CHANGE UP (ref 96–108)
CO2 SERPL-SCNC: 23 MMOL/L — SIGNIFICANT CHANGE UP (ref 22–31)
CREAT SERPL-MCNC: 0.72 MG/DL — SIGNIFICANT CHANGE UP (ref 0.5–1.3)
EGFR: 125 ML/MIN/1.73M2 — SIGNIFICANT CHANGE UP
GLUCOSE BLDC GLUCOMTR-MCNC: 117 MG/DL — HIGH (ref 70–99)
GLUCOSE BLDC GLUCOMTR-MCNC: 144 MG/DL — HIGH (ref 70–99)
GLUCOSE BLDC GLUCOMTR-MCNC: 178 MG/DL — HIGH (ref 70–99)
GLUCOSE BLDC GLUCOMTR-MCNC: 180 MG/DL — HIGH (ref 70–99)
GLUCOSE SERPL-MCNC: 187 MG/DL — HIGH (ref 70–99)
HCT VFR BLD CALC: 39.5 % — SIGNIFICANT CHANGE UP (ref 39–50)
HGB BLD-MCNC: 13.3 G/DL — SIGNIFICANT CHANGE UP (ref 13–17)
LIDOCAIN IGE QN: 2084 U/L — HIGH (ref 73–393)
MCHC RBC-ENTMCNC: 27.4 PG — SIGNIFICANT CHANGE UP (ref 27–34)
MCHC RBC-ENTMCNC: 33.7 GM/DL — SIGNIFICANT CHANGE UP (ref 32–36)
MCV RBC AUTO: 81.3 FL — SIGNIFICANT CHANGE UP (ref 80–100)
NRBC # BLD: 0 /100 WBCS — SIGNIFICANT CHANGE UP (ref 0–0)
PLATELET # BLD AUTO: 396 K/UL — SIGNIFICANT CHANGE UP (ref 150–400)
POTASSIUM SERPL-MCNC: 3.9 MMOL/L — SIGNIFICANT CHANGE UP (ref 3.5–5.3)
POTASSIUM SERPL-SCNC: 3.9 MMOL/L — SIGNIFICANT CHANGE UP (ref 3.5–5.3)
PROT SERPL-MCNC: 8 G/DL — SIGNIFICANT CHANGE UP (ref 6–8.3)
RBC # BLD: 4.86 M/UL — SIGNIFICANT CHANGE UP (ref 4.2–5.8)
RBC # FLD: 13 % — SIGNIFICANT CHANGE UP (ref 10.3–14.5)
SODIUM SERPL-SCNC: 136 MMOL/L — SIGNIFICANT CHANGE UP (ref 135–145)
WBC # BLD: 14.34 K/UL — HIGH (ref 3.8–10.5)
WBC # FLD AUTO: 14.34 K/UL — HIGH (ref 3.8–10.5)

## 2022-03-01 PROCEDURE — 99232 SBSQ HOSP IP/OBS MODERATE 35: CPT

## 2022-03-01 RX ADMIN — Medication 650 MILLIGRAM(S): at 00:47

## 2022-03-01 RX ADMIN — Medication 600 MILLIGRAM(S): at 05:45

## 2022-03-01 RX ADMIN — SENNA PLUS 2 TABLET(S): 8.6 TABLET ORAL at 21:40

## 2022-03-01 RX ADMIN — METFORMIN HYDROCHLORIDE 500 MILLIGRAM(S): 850 TABLET ORAL at 05:45

## 2022-03-01 RX ADMIN — Medication 2 UNIT(S): at 08:22

## 2022-03-01 RX ADMIN — Medication 2 UNIT(S): at 12:21

## 2022-03-01 RX ADMIN — METFORMIN HYDROCHLORIDE 500 MILLIGRAM(S): 850 TABLET ORAL at 17:30

## 2022-03-01 RX ADMIN — INSULIN GLARGINE 15 UNIT(S): 100 INJECTION, SOLUTION SUBCUTANEOUS at 21:37

## 2022-03-01 RX ADMIN — Medication 600 MILLIGRAM(S): at 17:30

## 2022-03-01 RX ADMIN — Medication 2: at 08:25

## 2022-03-01 RX ADMIN — PANTOPRAZOLE SODIUM 40 MILLIGRAM(S): 20 TABLET, DELAYED RELEASE ORAL at 05:45

## 2022-03-01 RX ADMIN — ENOXAPARIN SODIUM 40 MILLIGRAM(S): 100 INJECTION SUBCUTANEOUS at 13:31

## 2022-03-01 RX ADMIN — Medication 2 UNIT(S): at 17:30

## 2022-03-01 NOTE — PROGRESS NOTE ADULT - ASSESSMENT
32yo M with h/o DM on Janumet, presented w/ generalized epigastric-predominant abdominal pain x 3 days. Worsened with laying and standing, non-food related per se. Patient reports vomiting episode and persistent nausea. Patient reports he had similar complaints 2 years ago but it was not this bad and did not require hospitalization. In the ED pt was found afebrile, HR 88, /82, Sat 99% on RA. Labs on admission significant for Na 128,  TG 1894, Lipase 4550. CT A/P showed moderate peripancreatic stranding and edema c/w pancreatitis. In the ED pt received 2L NS bolus.  Admitted to ICU for pancreatitis requiring insulin drip.  Downgraded to 4S on 2/24/22.     2/28-Pt. completed Abx therapy with Zosyn, remains afebrile, abdomen  with Lipase trending up to 2166 today.  GI  consulted, MRCP ordered per reccs.    3/1-Abdomen mildly tender today, tolerating diet well, Lipase stable at 2084.  Awaiting MRCP, will f/u results.

## 2022-03-01 NOTE — PROGRESS NOTE ADULT - SUBJECTIVE AND OBJECTIVE BOX
Patient is a 31y old  Male who presents with a chief complaint of Abdominal pain (28 Feb 2022 19:10)    PATIENT IS SEEN AND EXAMINED IN MEDICAL FLOOR.  NGT [    ]    DUNN [   ]      GT [   ]    ALLERGIES:  No Known Allergies      Daily     Daily     VITALS:    Vital Signs Last 24 Hrs  T(C): 36.8 (01 Mar 2022 05:00), Max: 37 (28 Feb 2022 20:58)  T(F): 98.3 (01 Mar 2022 05:00), Max: 98.6 (28 Feb 2022 20:58)  HR: 64 (01 Mar 2022 05:00) (64 - 73)  BP: 129/79 (01 Mar 2022 05:00) (119/79 - 129/79)  BP(mean): --  RR: 16 (01 Mar 2022 05:00) (16 - 18)  SpO2: 98% (01 Mar 2022 05:00) (97% - 98%)    LABS:    CBC Full  -  ( 01 Mar 2022 07:34 )  WBC Count : 14.34 K/uL  RBC Count : 4.86 M/uL  Hemoglobin : 13.3 g/dL  Hematocrit : 39.5 %  Platelet Count - Automated : 396 K/uL  Mean Cell Volume : 81.3 fl  Mean Cell Hemoglobin : 27.4 pg  Mean Cell Hemoglobin Concentration : 33.7 gm/dL  Auto Neutrophil # : x  Auto Lymphocyte # : x  Auto Monocyte # : x  Auto Eosinophil # : x  Auto Basophil # : x  Auto Neutrophil % : x  Auto Lymphocyte % : x  Auto Monocyte % : x  Auto Eosinophil % : x  Auto Basophil % : x      03-01    136  |  103  |  16  ----------------------------<  187<H>  3.9   |  23  |  0.72    Ca    9.9      01 Mar 2022 07:34    TPro  8.0  /  Alb  2.9<L>  /  TBili  0.7  /  DBili  x   /  AST  79<H>  /  ALT  127<H>  /  AlkPhos  153<H>  03-01    CAPILLARY BLOOD GLUCOSE      POCT Blood Glucose.: 178 mg/dL (01 Mar 2022 07:39)  POCT Blood Glucose.: 167 mg/dL (28 Feb 2022 22:35)  POCT Blood Glucose.: 133 mg/dL (28 Feb 2022 16:52)  POCT Blood Glucose.: 163 mg/dL (28 Feb 2022 11:39)        LIVER FUNCTIONS - ( 01 Mar 2022 07:34 )  Alb: 2.9 g/dL / Pro: 8.0 g/dL / ALK PHOS: 153 U/L / ALT: 127 U/L DA / AST: 79 U/L / GGT: x           Creatinine Trend: 0.72<--, 0.80<--, 0.78<--, 0.68<--, 0.69<--, 0.52<--  I&O's Summary          .Blood Blood-Peripheral  02-21 @ 06:27   No Growth Final  --  --      Clean Catch Clean Catch (Midstream)  02-20 @ 12:33   No growth  --  --          MEDICATIONS:    MEDICATIONS  (STANDING):  dextrose 5%. 1000 milliLiter(s) (100 mL/Hr) IV Continuous <Continuous>  enoxaparin Injectable 40 milliGRAM(s) SubCutaneous daily  gemfibrozil 600 milliGRAM(s) Oral every 12 hours  glucagon  Injectable 1 milliGRAM(s) IntraMuscular once  insulin glargine Injectable (LANTUS) 15 Unit(s) SubCutaneous at bedtime  insulin lispro (ADMELOG) corrective regimen sliding scale   SubCutaneous three times a day before meals  insulin lispro Injectable (ADMELOG) 2 Unit(s) SubCutaneous three times a day before meals  metFORMIN 500 milliGRAM(s) Oral two times a day  pantoprazole    Tablet 40 milliGRAM(s) Oral before breakfast  senna 2 Tablet(s) Oral at bedtime      MEDICATIONS  (PRN):      REVIEW OF SYSTEMS:                           ALL ROS DONE [ X   ]    CONSTITUTIONAL:  LETHARGIC [   ], FEVER [   ], UNRESPONSIVE [   ]  CVS:  CP  [   ], SOB, [   ], PALPITATIONS [   ], DIZZYNESS [   ]  RS: COUGH [   ], SPUTUM [   ]  GI: ABDOMINAL PAIN [   ], NAUSEA [   ], VOMITINGS [   ], DIARRHEA [   ], CONSTIPATION [   ]  :  DYSURIA [   ], NOCTURIA [   ], INCREASED FREQUENCY [   ], DRIBLING [   ],  SKELETAL: PAINFUL JOINTS [   ], SWOLLEN JOINTS [   ], NECK ACHE [   ], LOW BACK ACHE [   ],  SKIN : ULCERS [   ], RASH [   ], ITCHING [   ]  CNS: HEAD ACHE [   ], DOUBLE VISION [   ], BLURRED VISION [   ], AMS / CONFUSION [   ], SEIZURES [   ], WEAKNESS [   ],TINGLING / NUMBNESS [   ]    PHYSICAL EXAMINATION:    GENERAL APPEARANCE: NO DISTRESS  HEENT:  NO PALLOR, NO  JVD,  NO   NODES, NECK SUPPLE  CVS: S1 +, S2 +,   RS: AEEB,  OCCASIONAL  RALES +,   NO RONCHI  ABD: SOFT, NT, NO, BS +  EXT: NO PE  SKIN: WARM,   SKELETAL:  ROM ACCEPTABLE  CNS:  AAO X  2-3  , NO  DEFICITS        RADIOLOGY :    < from: US Abdomen Upper Quadrant Right (02.23.22 @ 12:26) >    IMPRESSION:    Hepatomegaly and hepatic steatosis.  No cholelithiasis or biliary ductal dilatation.    < end of copied text >        ASSESSMENT :     Acute pancreatitis without infection or necrosis    Diabetes        PLAN:  HPI:  31yoM with h/o DMon oral medications (unsure about name and dose), presents generalized though possibly slightly epigastric-predominant abdominal pain x 3 days. Worsened with laying and standing, non-food related per se. Patient reports vomiting episode and persistent nausea.  Also notes constipation (last BM yesterday) and low urine output.   Patient also reports unable to pass flatus and had last BM on 1 day ago.   Patient reports he had similar complaints 2 years ago but it was not this bad and did not require hospitalization.          # ACUTE PANCREATITIS  -- PAIN ONCE MORE AFTER STARTING SOLID DIET  # HYPERTRIGLYCERIDEMIA - IMPROVING  # ALCOHOL ABUSE  # DM2, HYPERGLYCEMIA  - GIVEN IVF, S/P INSULIN DRIP, LANTUS SSI +FS ;  STARTED ON METFORMIN AND LOW-DOSE SULFONYLUREA  - ON GEMFIBROZIL  - COUNSELLED PATIENT ON CESSATION > 10 MINS, SW CONSULT FOR RESOURCES  - ON SOLID DIET  - DIABETIC EDUCATION  - PRN PAIN CONTROL  - ENDOCRINOLOGY CONSULT    - GASTROENTEROLOGY CONSULT - RECOMMEND MRCP - F/U RESULTS      # TRANSAMINITIS  # HEPATIC STEATOSIS  - NOTED RUQ U/S  - MONITORING LFTS  - F/U HEPATITIS PANEL  - HEPATOLOGY CONSULT    # HYPONATREMIA - RESOLVED  # HYPOKALEMIA - REPLETING WITH SUPPLEMENT  # HYPOPHOSPHATEMIA -RESOLVED    # MORBID OBESITY WITH METABOLIC SYNDROME  - COUNSELLED ON NUTRITION AND LIFESTYLE CHANGES    # GI AND DVT PPX       Patient is a 31y old  Male who presents with a chief complaint of Abdominal pain (28 Feb 2022 19:10)    PATIENT IS SEEN AND EXAMINED IN MEDICAL FLOOR.    ALLERGIES:  No Known Allergies    VITALS:    Vital Signs Last 24 Hrs  T(C): 36.8 (01 Mar 2022 05:00), Max: 37 (28 Feb 2022 20:58)  T(F): 98.3 (01 Mar 2022 05:00), Max: 98.6 (28 Feb 2022 20:58)  HR: 64 (01 Mar 2022 05:00) (64 - 73)  BP: 129/79 (01 Mar 2022 05:00) (119/79 - 129/79)  BP(mean): --  RR: 16 (01 Mar 2022 05:00) (16 - 18)  SpO2: 98% (01 Mar 2022 05:00) (97% - 98%)    LABS:    CBC Full  -  ( 01 Mar 2022 07:34 )  WBC Count : 14.34 K/uL  RBC Count : 4.86 M/uL  Hemoglobin : 13.3 g/dL  Hematocrit : 39.5 %  Platelet Count - Automated : 396 K/uL  Mean Cell Volume : 81.3 fl  Mean Cell Hemoglobin : 27.4 pg  Mean Cell Hemoglobin Concentration : 33.7 gm/dL  Auto Neutrophil # : x  Auto Lymphocyte # : x  Auto Monocyte # : x  Auto Eosinophil # : x  Auto Basophil # : x  Auto Neutrophil % : x  Auto Lymphocyte % : x  Auto Monocyte % : x  Auto Eosinophil % : x  Auto Basophil % : x      03-01    136  |  103  |  16  ----------------------------<  187<H>  3.9   |  23  |  0.72    Ca    9.9      01 Mar 2022 07:34    TPro  8.0  /  Alb  2.9<L>  /  TBili  0.7  /  DBili  x   /  AST  79<H>  /  ALT  127<H>  /  AlkPhos  153<H>  03-01    CAPILLARY BLOOD GLUCOSE      POCT Blood Glucose.: 178 mg/dL (01 Mar 2022 07:39)  POCT Blood Glucose.: 167 mg/dL (28 Feb 2022 22:35)  POCT Blood Glucose.: 133 mg/dL (28 Feb 2022 16:52)  POCT Blood Glucose.: 163 mg/dL (28 Feb 2022 11:39)        LIVER FUNCTIONS - ( 01 Mar 2022 07:34 )  Alb: 2.9 g/dL / Pro: 8.0 g/dL / ALK PHOS: 153 U/L / ALT: 127 U/L DA / AST: 79 U/L / GGT: x           Creatinine Trend: 0.72<--, 0.80<--, 0.78<--, 0.68<--, 0.69<--, 0.52<--  I&O's Summary          .Blood Blood-Peripheral  02-21 @ 06:27   No Growth Final  --  --      Clean Catch Clean Catch (Midstream)  02-20 @ 12:33   No growth  --  --          MEDICATIONS:    MEDICATIONS  (STANDING):  dextrose 5%. 1000 milliLiter(s) (100 mL/Hr) IV Continuous <Continuous>  enoxaparin Injectable 40 milliGRAM(s) SubCutaneous daily  gemfibrozil 600 milliGRAM(s) Oral every 12 hours  glucagon  Injectable 1 milliGRAM(s) IntraMuscular once  insulin glargine Injectable (LANTUS) 15 Unit(s) SubCutaneous at bedtime  insulin lispro (ADMELOG) corrective regimen sliding scale   SubCutaneous three times a day before meals  insulin lispro Injectable (ADMELOG) 2 Unit(s) SubCutaneous three times a day before meals  metFORMIN 500 milliGRAM(s) Oral two times a day  pantoprazole    Tablet 40 milliGRAM(s) Oral before breakfast  senna 2 Tablet(s) Oral at bedtime      MEDICATIONS  (PRN):      REVIEW OF SYSTEMS:                           ALL ROS DONE [ X   ]    CONSTITUTIONAL:  LETHARGIC [   ], FEVER [   ], UNRESPONSIVE [   ]  CVS:  CP  [   ], SOB, [   ], PALPITATIONS [   ], DIZZYNESS [   ]  RS: COUGH [   ], SPUTUM [   ]  GI: ABDOMINAL PAIN [   ], NAUSEA [   ], VOMITINGS [   ], DIARRHEA [   ], CONSTIPATION [   ]  :  DYSURIA [   ], NOCTURIA [   ], INCREASED FREQUENCY [   ], DRIBLING [   ],  SKELETAL: PAINFUL JOINTS [   ], SWOLLEN JOINTS [   ], NECK ACHE [   ], LOW BACK ACHE [   ],  SKIN : ULCERS [   ], RASH [   ], ITCHING [   ]  CNS: HEAD ACHE [   ], DOUBLE VISION [   ], BLURRED VISION [   ], AMS / CONFUSION [   ], SEIZURES [   ], WEAKNESS [   ],TINGLING / NUMBNESS [   ]    PHYSICAL EXAMINATION:    GENERAL APPEARANCE: NO DISTRESS  HEENT:  NO PALLOR, NO  JVD,  NO   NODES, NECK SUPPLE  CVS: S1 +, S2 +,   RS: AEEB,  OCCASIONAL  RALES +,   NO RONCHI  ABD: SOFT, NO, BS +   - PAIN IN EPIGASTRIUM TO DEEP PALPATION  EXT: NO PE  SKIN: WARM,   SKELETAL:  ROM ACCEPTABLE  CNS:  AAO X  2-3  , NO  DEFICITS        RADIOLOGY :    < from: US Abdomen Upper Quadrant Right (02.23.22 @ 12:26) >    IMPRESSION:    Hepatomegaly and hepatic steatosis.  No cholelithiasis or biliary ductal dilatation.    < end of copied text >        ASSESSMENT :     Acute pancreatitis without infection or necrosis    Diabetes        PLAN:  HPI:  31yoM with h/o DMon oral medications (unsure about name and dose), presents generalized though possibly slightly epigastric-predominant abdominal pain x 3 days. Worsened with laying and standing, non-food related per se. Patient reports vomiting episode and persistent nausea.  Also notes constipation (last BM yesterday) and low urine output.   Patient also reports unable to pass flatus and had last BM on 1 day ago.   Patient reports he had similar complaints 2 years ago but it was not this bad and did not require hospitalization.      # DELAYS WITH MRI MACHINE - D/W PATIENT AND RADIOLOGY - PLANNED FOR MRCP TODAY OR IN A.M. PENDING MACHINE AVAILIBILITY    # ACUTE PANCREATITIS  -- PAIN ONCE MORE AFTER STARTING SOLID DIET  # HYPERTRIGLYCERIDEMIA - IMPROVING  # ALCOHOL ABUSE  # DM2, HYPERGLYCEMIA  - GIVEN IVF, S/P INSULIN DRIP, LANTUS SSI +FS ;  STARTED ON METFORMIN AND LOW-DOSE SULFONYLUREA  - ON GEMFIBROZIL  - COUNSELLED PATIENT ON CESSATION > 10 MINS, SW CONSULT FOR RESOURCES  - ON SOLID DIET - MILD EPIGASTRIC PAIN TO PALPATION  - DIABETIC EDUCATION  - PRN PAIN CONTROL  - ENDOCRINOLOGY CONSULT    - GASTROENTEROLOGY CONSULT - RECOMMEND MRCP - F/U RESULTS      # TRANSAMINITIS  # HEPATIC STEATOSIS  - NOTED RUQ U/S  - MONITORING LFTS  - F/U HEPATITIS PANEL  - HEPATOLOGY CONSULT    # HYPONATREMIA - RESOLVED  # HYPOKALEMIA - REPLETING WITH SUPPLEMENT  # HYPOPHOSPHATEMIA -RESOLVED    # MORBID OBESITY WITH METABOLIC SYNDROME  - COUNSELLED ON NUTRITION AND LIFESTYLE CHANGES    # GI AND DVT PPX

## 2022-03-01 NOTE — PROGRESS NOTE ADULT - PROBLEM SELECTOR PLAN 6
- 2/25 pt insurance pending.  Therefore, will need Vivo meds for DC.  SW consulted, appreciated  -Discharge to home pending MRCP results
Resolved  Continue to monitor BMP in AM-f/u results
- 2/25 pt insurance pending.  Therefore, will need Vivo meds for DC.  SW consulted, appreciated  -Discharge to home pending MRCP results

## 2022-03-01 NOTE — PROGRESS NOTE ADULT - PROBLEM SELECTOR PLAN 1
- Likely due to alcohol.   - CT A/P: Moderate peripancreatic fatty stranding and edema consistent with acute interstitial edematous pancreatitis. No pseudocyst formation. Enlarged fatty liver  - U/S: Hepatomegaly and hepatic steatosis. No cholelithiasis or biliary ductal dilatation  - Awaiting for MRI/MRCP to evaluate of any developing acute peripancreatic collections  - trend LFTs

## 2022-03-01 NOTE — PROGRESS NOTE ADULT - SUBJECTIVE AND OBJECTIVE BOX
NP Note discussed with  Primary Attending    Patient is a 31y old  Male who presents with a chief complaint of Abdominal pain (01 Mar 2022 09:48)      INTERVAL HPI/OVERNIGHT EVENTS: no new complaints    MEDICATIONS  (STANDING):  dextrose 5%. 1000 milliLiter(s) (100 mL/Hr) IV Continuous <Continuous>  enoxaparin Injectable 40 milliGRAM(s) SubCutaneous daily  gemfibrozil 600 milliGRAM(s) Oral every 12 hours  glucagon  Injectable 1 milliGRAM(s) IntraMuscular once  insulin glargine Injectable (LANTUS) 15 Unit(s) SubCutaneous at bedtime  insulin lispro (ADMELOG) corrective regimen sliding scale   SubCutaneous three times a day before meals  insulin lispro Injectable (ADMELOG) 2 Unit(s) SubCutaneous three times a day before meals  metFORMIN 500 milliGRAM(s) Oral two times a day  pantoprazole    Tablet 40 milliGRAM(s) Oral before breakfast  senna 2 Tablet(s) Oral at bedtime    MEDICATIONS  (PRN):      __________________________________________________  REVIEW OF SYSTEMS:    CONSTITUTIONAL: No fever,   EYES: no acute visual disturbances  NECK: No pain or stiffness  RESPIRATORY: No cough; No shortness of breath  CARDIOVASCULAR: No chest pain, no palpitations  GASTROINTESTINAL: No pain. No nausea or vomiting; No diarrhea   NEUROLOGICAL: No headache or numbness, no tremors  MUSCULOSKELETAL: No joint pain, no muscle pain  GENITOURINARY: no dysuria, no frequency, no hesitancy  PSYCHIATRY: no depression , no anxiety  ALL OTHER  ROS negative        Vital Signs Last 24 Hrs  T(C): 36.8 (01 Mar 2022 05:00), Max: 37 (28 Feb 2022 20:58)  T(F): 98.3 (01 Mar 2022 05:00), Max: 98.6 (28 Feb 2022 20:58)  HR: 64 (01 Mar 2022 05:00) (64 - 73)  BP: 129/79 (01 Mar 2022 05:00) (119/79 - 129/79)  BP(mean): --  RR: 16 (01 Mar 2022 05:00) (16 - 18)  SpO2: 98% (01 Mar 2022 05:00) (97% - 98%)    ________________________________________________  PHYSICAL EXAM:  obese, well groomed  GENERAL: NAD  HEENT: Normocephalic;  conjunctivae and sclerae clear; moist mucous membranes;   NECK : supple  CHEST/LUNG: Clear to auscultation bilaterally with good air entry   HEART: S1 S2  regular; no murmurs, gallops or rubs  ABDOMEN: Soft, mildly tender, Nondistended; Bowel sounds present  EXTREMITIES: no cyanosis; no edema; no calf tenderness  SKIN: warm and dry; no rash  NERVOUS SYSTEM:  Awake and alert; Oriented  to place, person and time ; no new deficits    _________________________________________________  LABS:                        13.3   14.34 )-----------( 396      ( 01 Mar 2022 07:34 )             39.5     03-01    136  |  103  |  16  ----------------------------<  187<H>  3.9   |  23  |  0.72    Ca    9.9      01 Mar 2022 07:34    TPro  8.0  /  Alb  2.9<L>  /  TBili  0.7  /  DBili  x   /  AST  79<H>  /  ALT  127<H>  /  AlkPhos  153<H>  03-01        CAPILLARY BLOOD GLUCOSE      POCT Blood Glucose.: 144 mg/dL (01 Mar 2022 11:25)  POCT Blood Glucose.: 178 mg/dL (01 Mar 2022 07:39)  POCT Blood Glucose.: 167 mg/dL (28 Feb 2022 22:35)  POCT Blood Glucose.: 133 mg/dL (28 Feb 2022 16:52)    RADIOLOGY & ADDITIONAL TESTS:    < from: US Abdomen Upper Quadrant Right (02.23.22 @ 12:26) >    ACC: [id number] EXAM:  US ABDOMEN RT UPR QUADRANT                          PROCEDURE DATE:  02/23/2022          INTERPRETATION:  CLINICAL INFORMATION: Elevated bilirubin, pancreatitis    COMPARISON: CT dated 2/20/2022    TECHNIQUE: Sonography of the right upper quadrant.    FINDINGS:    Liver: Increased echogenicity. Enlarged (27.1 cm). Patent hepatic and   portal veins with normal flow direction.  Bile ducts: Normal caliber. Common bile duct measures 5 mm.  Gallbladder: Within normal limits.  Pancreas: Visualized portions are within normal limits.  Right kidney: 13.2 cm. No hydronephrosis.  Ascites: None.  IVC: Visualized portions are within normal limits.    IMPRESSION:    Hepatomegaly and hepatic steatosis.  No cholelithiasis or biliary ductal dilatation.      --- End of Report ---    < end of copied text >    < from: Xray Chest 1 View- PORTABLE-Urgent (Xray Chest 1 View- PORTABLE-Urgent .) (02.21.22 @ 00:56) >    ACC: [id number] EXAM:  XR CHEST PORTABLE URGENT 1V                          PROCEDURE DATE:  02/21/2022          INTERPRETATION:  CLINICAL INDICATION: 31 years  Male with Sepsis.    COMPARISON: 4/10/2020    AP view of the chest demonstrates the lungsto be clear. There is no   pleural effusion. There is no pneumothorax.    The heart, mediastinum and mauro cannot be assessed due to projection. .    The pulmonary vasculature is normal.    The osseous structures are unremarkable.    IMPRESSION:    Noacute infiltrate.    --- End of Report ---    < end of copied text >    < from: CT Abdomen and Pelvis w/ IV Cont (02.20.22 @ 06:26) >    ACC: [id number] EXAM:  CT ABDOMEN AND PELVIS IC                          PROCEDURE DATE:  02/20/2022          INTERPRETATION:  CLINICAL INFORMATION: Abdominal pain.    COMPARISON: None.    CONTRAST/COMPLICATIONS:  IV Contrast: Omnipaque 350  90 cc administered   10 cc discarded  Oral Contrast: NONE  Complications: None reported at time of study completion      PROCEDURE:  Axial CT images were acquired through the abdomen and pelvis following   intravenous contrast. Coronal and sagittal reformatted images provided..    FINDINGS:  LOWER CHEST: Visualized lung bases demonstrate mild dependent change.  LIVER: Diffuse hepatic steatosis. Enlarged right hepatic lobe.  BILE DUCTS: Normal caliber.  GALLBLADDER: No calcified gallstone.  SPLEEN: Within normal limits.  PANCREAS: There is a moderate peripancreatic fatty stranding and edema   consistent with acute interstitial edematous pancreatitis. There is no   pseudocyst formation.  ADRENALS: Within normal limits.  KIDNEYS/URETERS: Enhance symmetrically without hydronephrosis..    BLADDER: Within normal limits.  REPRODUCTIVE ORGANS: Prostate gland within normal limits. Seminal   vesicles symmetric.    BOWEL: Evaluation of bowel is limited without distention with oral   contrast, however there is no bowel obstruction. Normal appendix without   appendicitis. Small bowel loops are not dilated. Stomach is   underdistended for adequate evaluation.  PERITONEUM: No free air. Small volume scattered ascites..  VESSELS:  Within normal limits.  RETROPERITONEUM: Scattered nonenlarged mesenteric lymph nodes.  ABDOMINAL WALL: Within normal limits.  BONES: Nonspecific sclerotic focus of the right femoral head,   statistically a bone island.    IMPRESSION:    Moderate peripancreatic fatty stranding andedema consistent with acute   interstitial edematous pancreatitis. No pseudocyst formation.    Enlarged fatty liver.    --- End of Report ---    < end of copied text >      Imaging Personally Reviewed:  YES/NO    Consultant(s) Notes Reviewed:   YES/ No    Care Discussed with Consultants :     Plan of care was discussed with patient and /or primary care giver; all questions and concerns were addressed and care was aligned with patient's wishes.

## 2022-03-01 NOTE — PROGRESS NOTE ADULT - PROBLEM SELECTOR PLAN 2
- As above
likely due to ETOH-Improving  - s/p ICU Insulin gtt mgnt  - f/u am trig
likely due to ETOH-Improving  - s/p ICU Insulin gtt mgnt  - f/u am trig

## 2022-03-01 NOTE — CHART NOTE - NSCHARTNOTEFT_GEN_A_CORE
Reassessment:     Patient is a 31y old  Male who presents with a chief complaint of Abdominal pain (01 Mar 2022 12:54)    Factors impacting intake: [ ] none [ ] nausea  [ ] vomiting [ ] diarrhea [ ] constipation  [ ]chewing problems [ ] swallowing issues  [X ] other: acute pancreatitis w/out infection, uncontrolled diabetes mellitus,      Diet Prescription: Soft & Bite Sized, consistent carbohydrate w/evening snack, DASH/TLC with Glucerna Shake 1 can BID - 22    Intake: Patient visited, "asleep", d/w PCA, reports of fair po intake, consuming 40-50% of meals however had episodes of n/v with abdominal pain, elevated lipase -  noted, pending MRI/MRCP, GI/team following, nutrition education deferred, rec. c/w diet as ordered & Add Glucerna Shake 1can bid as medically feasible (440kcal, 20g protein). RD available.     Daily Weight in k.9 (2022 05:00)  Weight in k.2 (2022 21:06)  Weight in k.1 (2022 07:00)    % Weight Change: wt. fluctuation noted possible due to fluid shift     Pertinent Medications: MEDICATIONS  (STANDING):  dextrose 5%. 1000 milliLiter(s) (100 mL/Hr) IV Continuous <Continuous>  enoxaparin Injectable 40 milliGRAM(s) SubCutaneous daily  gemfibrozil 600 milliGRAM(s) Oral every 12 hours  glucagon  Injectable 1 milliGRAM(s) IntraMuscular once  insulin glargine Injectable (LANTUS) 15 Unit(s) SubCutaneous at bedtime  insulin lispro (ADMELOG) corrective regimen sliding scale   SubCutaneous three times a day before meals  insulin lispro Injectable (ADMELOG) 2 Unit(s) SubCutaneous three times a day before meals  metFORMIN 500 milliGRAM(s) Oral two times a day  pantoprazole    Tablet 40 milliGRAM(s) Oral before breakfast  senna 2 Tablet(s) Oral at bedtime    MEDICATIONS  (PRN):    Pertinent Labs:  Na136 mmol/L Glu 187 mg/dL<H> K+ 3.9 mmol/L Cr  0.72 mg/dL BUN 16 mg/dL  Phos 4.3 mg/dL  Alb 2.9 g/dL<L>  Chol --    LDL --    HDL --    Trig 408 mg/dL<H>     CAPILLARY BLOOD GLUCOSE      POCT Blood Glucose.: 144 mg/dL (01 Mar 2022 11:25)  POCT Blood Glucose.: 178 mg/dL (01 Mar 2022 07:39)  POCT Blood Glucose.: 167 mg/dL (2022 22:35)  POCT Blood Glucose.: 133 mg/dL (2022 16:52)    Skin: intact     Estimated Needs:   [ X] no change since previous assessment  [ ] recalculated:     Previous Nutrition Diagnosis:   [ ] Inadequate Energy Intake [ ]Inadequate Oral Intake [ ] Excessive Energy Intake   [ ] Underweight [ ] Increased Nutrient Needs [ ] Overweight/Obesity   [ ] Altered GI Function [ ] Unintended Weight Loss [ ] Food & Nutrition Related Knowledge Deficit [ ] Malnutrition     Nutrition Diagnosis is [ ] ongoing  [ ] resolved [ ] not applicable     New Nutrition Diagnosis: [ ] not applicable       Interventions:   Recommend  [ ] Change Diet To:  [ ] Nutrition Supplement  [ ] Nutrition Support  [ ] Other:     Monitoring and Evaluation:   [ ] PO intake [ x ] Tolerance to diet prescription [ x ] weights [ x ] labs[ x ] follow up per protocol  [ ] other: Reassessment:     Patient is a 31y old  Male who presents with a chief complaint of Abdominal pain (01 Mar 2022 12:54)    Factors impacting intake: [ ] none [ ] nausea  [ ] vomiting [ ] diarrhea [ ] constipation  [ ]chewing problems [ ] swallowing issues  [X ] other: acute pancreatitis w/out infection, uncontrolled diabetes mellitus,      Diet Prescription: Soft & Bite Sized, consistent carbohydrate w/evening snack, DASH/TLC with Glucerna Shake 1 can BID - 22    Intake: Patient visited, "asleep", d/w PCA, reports of fair po intake, consuming 40-50% of meals however had episodes of n/v with abdominal pain, elevated lipase -  noted, pending MRI/MRCP, GI/team following, nutrition education deferred, rec. c/w diet as ordered & Add Glucerna Shake 1can bid as medically feasible (440kcal, 20g protein). RD available.     Daily Weight in k.9 (2022 05:00)  Weight in k.2 (2022 21:06)  Weight in k.1 (2022 07:00)    % Weight Change: wt. fluctuation noted possible due to fluid shift     Pertinent Medications: MEDICATIONS  (STANDING):  dextrose 5%. 1000 milliLiter(s) (100 mL/Hr) IV Continuous <Continuous>  enoxaparin Injectable 40 milliGRAM(s) SubCutaneous daily  gemfibrozil 600 milliGRAM(s) Oral every 12 hours  glucagon  Injectable 1 milliGRAM(s) IntraMuscular once  insulin glargine Injectable (LANTUS) 15 Unit(s) SubCutaneous at bedtime  insulin lispro (ADMELOG) corrective regimen sliding scale   SubCutaneous three times a day before meals  insulin lispro Injectable (ADMELOG) 2 Unit(s) SubCutaneous three times a day before meals  metFORMIN 500 milliGRAM(s) Oral two times a day  pantoprazole    Tablet 40 milliGRAM(s) Oral before breakfast  senna 2 Tablet(s) Oral at bedtime    MEDICATIONS  (PRN):    Pertinent Labs:  Na136 mmol/L Glu 187 mg/dL<H> K+ 3.9 mmol/L Cr  0.72 mg/dL BUN 16 mg/dL  Phos 4.3 mg/dL  Alb 2.9 g/dL<L>  Chol --    LDL --    HDL --    Trig 408 mg/dL<H>     CAPILLARY BLOOD GLUCOSE      POCT Blood Glucose.: 144 mg/dL (01 Mar 2022 11:25)  POCT Blood Glucose.: 178 mg/dL (01 Mar 2022 07:39)  POCT Blood Glucose.: 167 mg/dL (2022 22:35)  POCT Blood Glucose.: 133 mg/dL (2022 16:52)    Skin: intact     Estimated Needs:   [ X] no change since previous assessment  [ ] recalculated:     Previous Nutrition Diagnosis:   [ ] Inadequate Energy Intake [X ]Inadequate Oral Intake [ ] Excessive Energy Intake   [ ] Underweight [ ] Increased Nutrient Needs [ ] Overweight/Obesity   [ ] Altered GI Function [ ] Unintended Weight Loss [ ] Food & Nutrition Related Knowledge Deficit [ ] Malnutrition     Nutrition Diagnosis is [X ] ongoing  [ ] resolved [ ] not applicable     New Nutrition Diagnosis: [ ] not applicable       Interventions: To meet nutrition needs   Recommend  [ ] Change Diet To:  [ ] Nutrition Supplement  [ ] Nutrition Support  [X ] Other: Nursing to continue with meal set up and encouragement    Monitoring and Evaluation:   [X] PO intake [ x ] Tolerance to diet prescription [ x ] weights [ x ] labs[ x ] follow up per protocol  [ ] other:

## 2022-03-01 NOTE — PROGRESS NOTE ADULT - PROBLEM SELECTOR PLAN 3
A1C 9.5  FS stable  Continue to monitor FS  DC Plan: DC Janumet.  Start metformin 1000mg bid and Amaryl 2mg qd  upon d/c  Endo Dr. Reyes following, note appreciated
A1C 9.5  FS stable  Continue to monitor FS  DC Plan: DC Janumet.  Start metformin 1000mg bid and Amaryl 2mg qd  upon d/c  Endo Dr. Reyes following, note appreciated
FS stable  Continue to monitor FS  DC Plan: DC Janumet.  Start metformin 1000mg bid and Amaryl 2mg qd  upon d/c  Endocrinologist-Dr. Reyes consulted, appreciated

## 2022-03-01 NOTE — PROGRESS NOTE ADULT - SUBJECTIVE AND OBJECTIVE BOX
GI PROGRESS NOTE    Patient is a 31y old  Male who presents with a chief complaint of Abdominal pain (01 Mar 2022 12:54)      HPI:  31yoM with h/o DMon oral medications (unsure about name and dose), presents generalized though possibly slightly epigastric-predominant abdominal pain x 3 days. Worsened with laying and standing, non-food related per se. Patient reports vomiting episode and persistent nausea.  Also notes constipation (last BM yesterday) and low urine output.   Patient also reports unable to pass flatus and had last BM on 1 day ago.   Patient reports he had similar complaints 2 years ago but it was not this bad and did not require hospitalization.    ED Course:  TG 1984, Lipase 4550 , Na 128  ICU Vital Signs Last 24 Hrs  T(C): 36.7 (20 Feb 2022 07:00), Max: 37 (20 Feb 2022 04:18)  T(F): 98 (20 Feb 2022 07:00), Max: 98.6 (20 Feb 2022 04:18)  HR: 88 (20 Feb 2022 07:00) (88 - 94)  BP: 137/82 (20 Feb 2022 07:00) (137/82 - 151/98)  RR: 18 (20 Feb 2022 07:00) (18 - 18)  SpO2: 99% (20 Feb 2022 07:00) (98% - 99%)     (20 Feb 2022 08:38)          ______________________________________________________________________  PMH/PSH:  PAST MEDICAL & SURGICAL HISTORY:  Diabetes      ______________________________________________________________________  MEDS:  MEDICATIONS  (STANDING):  dextrose 5%. 1000 milliLiter(s) (100 mL/Hr) IV Continuous <Continuous>  enoxaparin Injectable 40 milliGRAM(s) SubCutaneous daily  gemfibrozil 600 milliGRAM(s) Oral every 12 hours  glucagon  Injectable 1 milliGRAM(s) IntraMuscular once  insulin glargine Injectable (LANTUS) 15 Unit(s) SubCutaneous at bedtime  insulin lispro (ADMELOG) corrective regimen sliding scale   SubCutaneous three times a day before meals  insulin lispro Injectable (ADMELOG) 2 Unit(s) SubCutaneous three times a day before meals  metFORMIN 500 milliGRAM(s) Oral two times a day  pantoprazole    Tablet 40 milliGRAM(s) Oral before breakfast  senna 2 Tablet(s) Oral at bedtime    MEDICATIONS  (PRN):    ______________________________________________________________________  ALL:   Allergies    No Known Allergies    Intolerances      ______________________________________________________________________  SH: Social History:  Active smoker, 1 Pck in a week,   Drinks Beer 3 times a week (20 Feb 2022 08:38)    PHYSICAL EXAM:  T(C): 36.7 (03-01-22 @ 13:03), Max: 37 (02-28-22 @ 20:58)  HR: 74 (03-01-22 @ 13:03)  BP: 126/71 (03-01-22 @ 13:03)  RR: 18 (03-01-22 @ 13:03)  SpO2: 100% (03-01-22 @ 13:03)  Wt(kg): --    _____________________________________________________________________  LABS:                        13.3   14.34 )-----------( 396      ( 01 Mar 2022 07:34 )             39.5     03-01    136  |  103  |  16  ----------------------------<  187<H>  3.9   |  23  |  0.72    Ca    9.9      01 Mar 2022 07:34    TPro  8.0  /  Alb  2.9<L>  /  TBili  0.7  /  DBili  x   /  AST  79<H>  /  ALT  127<H>  /  AlkPhos  153<H>  03-01    LIVER FUNCTIONS - ( 01 Mar 2022 07:34 )  Alb: 2.9 g/dL / Pro: 8.0 g/dL / ALK PHOS: 153 U/L / ALT: 127 U/L DA / AST: 79 U/L / GGT: x           Lipase, Serum in AM (03.01.22 @ 07:34)    Lipase, Serum: 2084 U/L

## 2022-03-01 NOTE — PROGRESS NOTE ADULT - PROBLEM SELECTOR PLAN 1
- Presented with abdominal pain, elevated lipase  - 2/20 CT abdomen showed moderate peripancreatic fatty stranding and edema consistent with acute interstitial edematous pancreatitis. No pseudocyst formation.  - Believed etiology of acute pancreatitis d/t hypertriglyceridemia and alcoholism   - s/p IVF   - S/p Insulin gtt  - s/p Zosyn course  - Lipase trending back up to 2166 ->2088  - GI Dr. Arreguin consulted  - Awaiting MRCP to R/O peripancreatic collection  - WBC trending up 11.58->14.34, afebrile

## 2022-03-01 NOTE — PROGRESS NOTE ADULT - PROBLEM SELECTOR PLAN 4
likely due to pancreatitis  - Elevated T bili- per RUQ US Common bile duct measures 5 mm  - 2/23-RUQ US showed evidence of hepatic steatosis, hepatomegaly. No evidence of biliary ductal tube dilation or cholecystitis   - Continue to monitor LFTs  - Hepatologist-Dr. Gonzalez following, note appreciated
likely due to pancreatitis  - Elevated T bili- per RUQ US Common bile duct measures 5 mm  - 2/23-RUQ US showed evidence of hepatic steatosis, hepatomegaly. No evidence of biliary ductal tube dilation or cholecystitis   - Continue to monitor LFTs  - Hepatologist-Dr. Gonzalez following, note appreciated
- Most likely due to pancreatitis  - Elevated T bili- per RUQ US Common bile duct measures 5 mm  - 2/23-RUQ US showed evidence of hepatic steatosis, hepatomegaly. No evidence of biliary ductal tube dilation or cholecystitis   - Continue to monitor LFTs  - Hepatologist-Dr. Gonzalez consult pending-f/u recommendations

## 2022-03-01 NOTE — PROGRESS NOTE ADULT - PROBLEM SELECTOR PLAN 5
- C/w Lovenox for DVT ppx  - C/w Protonix for GI ppx
- Na 134, corrected for blood glucose is 135  - Stable trending in correct direction   - Continue to monitor BMP in AM-f/u results
- C/w Lovenox for DVT ppx  - C/w Protonix for GI ppx

## 2022-03-02 VITALS
RESPIRATION RATE: 19 BRPM | SYSTOLIC BLOOD PRESSURE: 119 MMHG | DIASTOLIC BLOOD PRESSURE: 80 MMHG | HEART RATE: 73 BPM | TEMPERATURE: 98 F | OXYGEN SATURATION: 94 %

## 2022-03-02 LAB
ALBUMIN SERPL ELPH-MCNC: 2.8 G/DL — LOW (ref 3.5–5)
ALP SERPL-CCNC: 154 U/L — HIGH (ref 40–120)
ALT FLD-CCNC: 131 U/L DA — HIGH (ref 10–60)
ANION GAP SERPL CALC-SCNC: 11 MMOL/L — SIGNIFICANT CHANGE UP (ref 5–17)
AST SERPL-CCNC: 76 U/L — HIGH (ref 10–40)
BILIRUB SERPL-MCNC: 0.9 MG/DL — SIGNIFICANT CHANGE UP (ref 0.2–1.2)
BUN SERPL-MCNC: 15 MG/DL — SIGNIFICANT CHANGE UP (ref 7–18)
CALCIUM SERPL-MCNC: 9.7 MG/DL — SIGNIFICANT CHANGE UP (ref 8.4–10.5)
CHLORIDE SERPL-SCNC: 103 MMOL/L — SIGNIFICANT CHANGE UP (ref 96–108)
CO2 SERPL-SCNC: 21 MMOL/L — LOW (ref 22–31)
CREAT SERPL-MCNC: 0.7 MG/DL — SIGNIFICANT CHANGE UP (ref 0.5–1.3)
EGFR: 126 ML/MIN/1.73M2 — SIGNIFICANT CHANGE UP
GLUCOSE BLDC GLUCOMTR-MCNC: 174 MG/DL — HIGH (ref 70–99)
GLUCOSE BLDC GLUCOMTR-MCNC: 209 MG/DL — HIGH (ref 70–99)
GLUCOSE SERPL-MCNC: 183 MG/DL — HIGH (ref 70–99)
HCT VFR BLD CALC: 38.9 % — LOW (ref 39–50)
HGB BLD-MCNC: 13.1 G/DL — SIGNIFICANT CHANGE UP (ref 13–17)
LIDOCAIN IGE QN: 3687 U/L — HIGH (ref 73–393)
MCHC RBC-ENTMCNC: 27.2 PG — SIGNIFICANT CHANGE UP (ref 27–34)
MCHC RBC-ENTMCNC: 33.7 GM/DL — SIGNIFICANT CHANGE UP (ref 32–36)
MCV RBC AUTO: 80.7 FL — SIGNIFICANT CHANGE UP (ref 80–100)
NRBC # BLD: 0 /100 WBCS — SIGNIFICANT CHANGE UP (ref 0–0)
PLATELET # BLD AUTO: 384 K/UL — SIGNIFICANT CHANGE UP (ref 150–400)
POTASSIUM SERPL-MCNC: 3.5 MMOL/L — SIGNIFICANT CHANGE UP (ref 3.5–5.3)
POTASSIUM SERPL-SCNC: 3.5 MMOL/L — SIGNIFICANT CHANGE UP (ref 3.5–5.3)
PROT SERPL-MCNC: 8 G/DL — SIGNIFICANT CHANGE UP (ref 6–8.3)
RBC # BLD: 4.82 M/UL — SIGNIFICANT CHANGE UP (ref 4.2–5.8)
RBC # FLD: 12.9 % — SIGNIFICANT CHANGE UP (ref 10.3–14.5)
SODIUM SERPL-SCNC: 135 MMOL/L — SIGNIFICANT CHANGE UP (ref 135–145)
WBC # BLD: 13.03 K/UL — HIGH (ref 3.8–10.5)
WBC # FLD AUTO: 13.03 K/UL — HIGH (ref 3.8–10.5)

## 2022-03-02 PROCEDURE — 81001 URINALYSIS AUTO W/SCOPE: CPT

## 2022-03-02 PROCEDURE — 83735 ASSAY OF MAGNESIUM: CPT

## 2022-03-02 PROCEDURE — 83690 ASSAY OF LIPASE: CPT

## 2022-03-02 PROCEDURE — 87040 BLOOD CULTURE FOR BACTERIA: CPT

## 2022-03-02 PROCEDURE — 84100 ASSAY OF PHOSPHORUS: CPT

## 2022-03-02 PROCEDURE — 80076 HEPATIC FUNCTION PANEL: CPT

## 2022-03-02 PROCEDURE — 99285 EMERGENCY DEPT VISIT HI MDM: CPT | Mod: 25

## 2022-03-02 PROCEDURE — 0225U NFCT DS DNA&RNA 21 SARSCOV2: CPT

## 2022-03-02 PROCEDURE — 87635 SARS-COV-2 COVID-19 AMP PRB: CPT

## 2022-03-02 PROCEDURE — 84145 PROCALCITONIN (PCT): CPT

## 2022-03-02 PROCEDURE — 74183 MRI ABD W/O CNTR FLWD CNTR: CPT | Mod: 26

## 2022-03-02 PROCEDURE — 36415 COLL VENOUS BLD VENIPUNCTURE: CPT

## 2022-03-02 PROCEDURE — 84478 ASSAY OF TRIGLYCERIDES: CPT

## 2022-03-02 PROCEDURE — 83605 ASSAY OF LACTIC ACID: CPT

## 2022-03-02 PROCEDURE — 99232 SBSQ HOSP IP/OBS MODERATE 35: CPT

## 2022-03-02 PROCEDURE — 85027 COMPLETE CBC AUTOMATED: CPT

## 2022-03-02 PROCEDURE — 76705 ECHO EXAM OF ABDOMEN: CPT

## 2022-03-02 PROCEDURE — 87640 STAPH A DNA AMP PROBE: CPT

## 2022-03-02 PROCEDURE — 80053 COMPREHEN METABOLIC PANEL: CPT

## 2022-03-02 PROCEDURE — 86850 RBC ANTIBODY SCREEN: CPT

## 2022-03-02 PROCEDURE — 85025 COMPLETE CBC W/AUTO DIFF WBC: CPT

## 2022-03-02 PROCEDURE — 83615 LACTATE (LD) (LDH) ENZYME: CPT

## 2022-03-02 PROCEDURE — 74183 MRI ABD W/O CNTR FLWD CNTR: CPT

## 2022-03-02 PROCEDURE — 87641 MR-STAPH DNA AMP PROBE: CPT

## 2022-03-02 PROCEDURE — 74177 CT ABD & PELVIS W/CONTRAST: CPT | Mod: MA

## 2022-03-02 PROCEDURE — 86900 BLOOD TYPING SEROLOGIC ABO: CPT

## 2022-03-02 PROCEDURE — 83036 HEMOGLOBIN GLYCOSYLATED A1C: CPT

## 2022-03-02 PROCEDURE — 80074 ACUTE HEPATITIS PANEL: CPT

## 2022-03-02 PROCEDURE — 86703 HIV-1/HIV-2 1 RESULT ANTBDY: CPT

## 2022-03-02 PROCEDURE — 82962 GLUCOSE BLOOD TEST: CPT

## 2022-03-02 PROCEDURE — 71045 X-RAY EXAM CHEST 1 VIEW: CPT

## 2022-03-02 PROCEDURE — 87086 URINE CULTURE/COLONY COUNT: CPT

## 2022-03-02 PROCEDURE — 80048 BASIC METABOLIC PNL TOTAL CA: CPT

## 2022-03-02 PROCEDURE — 86901 BLOOD TYPING SEROLOGIC RH(D): CPT

## 2022-03-02 PROCEDURE — A9585: CPT

## 2022-03-02 RX ADMIN — METFORMIN HYDROCHLORIDE 500 MILLIGRAM(S): 850 TABLET ORAL at 05:33

## 2022-03-02 RX ADMIN — Medication 2: at 12:10

## 2022-03-02 RX ADMIN — Medication 4: at 08:12

## 2022-03-02 RX ADMIN — Medication 2 UNIT(S): at 08:13

## 2022-03-02 RX ADMIN — PANTOPRAZOLE SODIUM 40 MILLIGRAM(S): 20 TABLET, DELAYED RELEASE ORAL at 06:58

## 2022-03-02 RX ADMIN — ENOXAPARIN SODIUM 40 MILLIGRAM(S): 100 INJECTION SUBCUTANEOUS at 12:09

## 2022-03-02 RX ADMIN — Medication 2 UNIT(S): at 12:11

## 2022-03-02 RX ADMIN — Medication 600 MILLIGRAM(S): at 05:32

## 2022-03-02 NOTE — PROGRESS NOTE ADULT - ATTENDING COMMENTS
- Acute pancreatitis.  - Acute peripancreatic fluid collection.  - Abd pain.    Patient seen and examined. Reports feeling much better today. Abd pain controlled, minimal at this point. No n/v. Tolerating solid food well. MRI reviewed- peripancreatic collection noted likely cause of persistent lipase elevation and leukocytosis (suspected reactive given no other signs of infection). F/u with me as an outpatient in 1-2 weeks upon discharge today and will plan for repeat imaging to ensure resolution of collection.
ASSESSMENT AND PLAN: 30 YO with medical history of DM2 admitted to ICU for hypertriglyceridemia requiring Insulin drip     -Acute Pancreatitis   - Hypertriglyceridemia   - Hyponatremia   - Hyperglycemia   - Transaminitis  - Dm2  - Obesity  - Active smoker   - Alcohol use         Plan   - Taper off insulin gtt   - Lantus   - Triglyceride trending down   - Start Lopid   - Diet   - BMP q6h   - Advance diet as tolerated   - RUQ abd US due to increasing T. Bili and fever   - PPI   - Monitor for with drawl   - Nicotine Patch   - Advise to stop smoking and using alcohol   - DVT GI prophy .   - .
ASSESSMENT AND PLAN: 30 YO with medical history of DM2 admitted to ICU for hypertriglyceridemia requiring Insulin drip     -Acute Pancreatitis   - Hypertriglyceridemia   - Hyponatremia   - Hyperglycemia   - Transaminitis  - Dm2  - Obesity  - Active smoker   - Alcohol use         Plan   - Taper off insulin gtt   - Lantus   - Triglyceride trending down   - Start Lopid   - Diet   - BMP q6h   - Clear liquid diet   - PPI   - Monitor for with drawl   - Nicotine Patch   - Advise to stop smoking and using alcohol   - DVT GI prophy .   -
ASSESSMENT AND PLAN: 30 YO with medical history of DM2 admitted to ICU for hypertriglyceridemia requiring Insulin drip     -Acute Pancreatitis   - Hypertriglyceridemia   - Hyponatremia   - Hyperglycemia   - Transaminitis  - Dm2  - Obesity  - Active smoker   - Alcohol use         Plan   - Taper off insulin gtt   - Lantus   - Triglyceride trending down   - Started  Lopid   - Advance diet as tolerated   - RUQ abd US due to increasing T. Bili and fever   - PPI   - Monitor for with drawl   - Nicotine Patch   - Advise to stop smoking and using alcohol   - DVT GI prophy .
ASSESSMENT AND PLAN: 32 YO with medical history of DM2 admitted to ICU for hypertriglyceridemia requiring Insulin drip     -Acute Pancreatitis   - Hypertriglyceridemia   - Hyponatremia   - Hyperglycemia   - Transaminitis  - Dm2  - Obesity  - Active smoker   - Alcohol use         Plan   - Lantus   - Triglyceride trending down   - Started  Lopid   - Advance diet as tolerated   - RUQ abd US due to increasing T. Bili and fever   - PPI   - Monitor for with drawl   - Nicotine Patch   - Advise to stop smoking and using alcohol   - DVT GI prophy .
- Pancreatitis.   - Etoh abuse.  - Elevated LFTs.    Awaiting MRI/MRCP. No new complaints. Abd soft, mildly tender epigastrium. Diet as tolerated. F/u MRI/MRCP. Trend LFTs.

## 2022-03-02 NOTE — PROGRESS NOTE ADULT - SUBJECTIVE AND OBJECTIVE BOX
GI PROGRESS NOTE    Patient is a 31y old  Male who presents with a chief complaint of Abdominal pain (02 Mar 2022 10:05)      HPI:  31yoM with h/o DMon oral medications (unsure about name and dose), presents generalized though possibly slightly epigastric-predominant abdominal pain x 3 days. Worsened with laying and standing, non-food related per se. Patient reports vomiting episode and persistent nausea.  Also notes constipation (last BM yesterday) and low urine output.   Patient also reports unable to pass flatus and had last BM on 1 day ago.   Patient reports he had similar complaints 2 years ago but it was not this bad and did not require hospitalization.     ID [id number]  GI HPI:  Pt reports epigastric pain improving and feels much better today. Pt s/p MRCP this AM. Tolerated breakfast without discomfort or nausea.         ______________________________________________________________________  PMH/PSH:  PAST MEDICAL & SURGICAL HISTORY:  Diabetes      ______________________________________________________________________  MEDS:  MEDICATIONS  (STANDING):  dextrose 5%. 1000 milliLiter(s) (100 mL/Hr) IV Continuous <Continuous>  enoxaparin Injectable 40 milliGRAM(s) SubCutaneous daily  gemfibrozil 600 milliGRAM(s) Oral every 12 hours  glucagon  Injectable 1 milliGRAM(s) IntraMuscular once  insulin glargine Injectable (LANTUS) 15 Unit(s) SubCutaneous at bedtime  insulin lispro (ADMELOG) corrective regimen sliding scale   SubCutaneous three times a day before meals  insulin lispro Injectable (ADMELOG) 2 Unit(s) SubCutaneous three times a day before meals  metFORMIN 500 milliGRAM(s) Oral two times a day  pantoprazole    Tablet 40 milliGRAM(s) Oral before breakfast  senna 2 Tablet(s) Oral at bedtime    MEDICATIONS  (PRN):    ______________________________________________________________________  ALL:   Allergies    No Known Allergies    Intolerances      ______________________________________________________________________  SH: Social History:  Active smoker, 1 Pck in a week,   Drinks Beer 3 times a week (20 Feb 2022 08:38)    ______________________________________________________________________  FH:  FAMILY HISTORY:    ______________________________________________________________________  ROS:    CONSTITUTIONAL:  No weight loss, fever, chills, weakness or fatigue.    HEENT:  Eyes:  No visual loss, blurred vision, double vision or yellow sclerae. Ears, Nose, Throat:  No hearing loss, sneezing, congestion, runny nose or sore throat.    SKIN:  No rash or itching.    CARDIOVASCULAR:  No chest pain, chest pressure or chest discomfort. No palpitations or edema.    RESPIRATORY:  No shortness of breath, cough or sputum.    GASTROINTESTINAL:  SEE HPI    GENITOURINARY:  No dysuria, hematuria, urinary frequency    NEUROLOGICAL:  No headache, dizziness, syncope, paralysis, ataxia, numbness or tingling in the extremities. No change in bowel or bladder control.    MUSCULOSKELETAL:  No muscle, back pain, joint pain or stiffness.    HEMATOLOGIC:  No anemia, bleeding or bruising.    LYMPHATICS:  No enlarged nodes. No history of splenectomy.    PSYCHIATRIC:  No history of depression or anxiety.    ENDOCRINOLOGIC:  No reports of sweating, cold or heat intolerance. No polyuria or polydipsia.    ALLERGIES:  No history of asthma, hives, eczema or rhinitis.  ______________________________________________________________________  PHYSICAL EXAM:  T(C): 36.6 (03-02-22 @ 05:00), Max: 36.7 (03-01-22 @ 13:03)  HR: 77 (03-02-22 @ 05:00)  BP: 145/77 (03-02-22 @ 05:00)  RR: 15 (03-02-22 @ 05:00)  SpO2: 93% (03-02-22 @ 05:00)  Wt(kg): --      GEN: NAD   CVS- S1 S2  ABD: soft, nontender, non distended, bowel sounds+  LUNGS: clear to auscultation  NEURO: noin focal neuro exam; AAO x 3  Extremities: no cyanosis, no calf tenderness, no edema, no clubbing      ______________________________________________________________________  LABS:                        13.1   13.03 )-----------( 384      ( 02 Mar 2022 07:22 )             38.9     03-02    135  |  103  |  15  ----------------------------<  183<H>  3.5   |  21<L>  |  0.70    Ca    9.7      02 Mar 2022 07:22    TPro  8.0  /  Alb  2.8<L>  /  TBili  0.9  /  DBili  x   /  AST  76<H>  /  ALT  131<H>  /  AlkPhos  154<H>  03-02    LIVER FUNCTIONS - ( 02 Mar 2022 07:22 )  Alb: 2.8 g/dL / Pro: 8.0 g/dL / ALK PHOS: 154 U/L / ALT: 131 U/L DA / AST: 76 U/L / GGT: x           Lipase, Serum (03.02.22 @ 07:22)    Lipase, Serum: 3687 U/L      ______________________________________________________________________  IMAGING:  < from: MR MRCP w/wo IV Cont (03.02.22 @ 10:44) >    ACC: [id number] EXAM:  MR MRCP [facility] IC                          PROCEDURE DATE:  03/02/2022          INTERPRETATION:  CLINICAL INFORMATION: Acute pancreatitis    COMPARISON: No prior abdominal MR is available for comparison. Reference   is made with aprevious abdominal CT dated 2/20/2022.    CONTRAST/COMPLICATIONS:  IV Contrast: Gadavist  10 cc administered  Oral Contrast: NONE  Complications: None reported at time of study completion    PROCEDURE:  MRI of the abdomen was performed.  MRCP was performed.    FINDINGS:  LOWER CHEST: Within normal limits.    LIVER: Hepatic steatosis. Small 0.8 cm peripheral transient arterial   phase enhancing focus in hepatic segment 5, suggestive of an   arterioportal shunt.  BILE DUCTS: The common bile duct measures up to 5 mm in caliber which is   within normal limits. No evidence for choledocholithiasis.  GALLBLADDER: No evidence for gallstones, thickened gallbladder wall,   pericholecystic fluid.  SPLEEN: Splenomegaly measuring 17.2 cm.  PANCREAS: Peripancreatic edema, compatible with acute pancreatitis. No   evidence for pancreatic necrosis. 2.0 x 8.0 cm fluid collection adjacent   to the pancreas inferiorly, likely representing acute peripancreatic   fluid collection. There is T1 hyperintensity within this fluid   collection, suggestive of hemorrhagic or proteinaceous content.    ADRENALS: Within normal limits.  KIDNEYS/URETERS: Within normal limits.    VISUALIZED PORTIONS:  BOWEL: Within normal limits.  PERITONEUM: Small ascites.  VESSELS: Within normal limits.  RETROPERITONEUM/LYMPH NODES: Mildly enlarged 1.3 cm peripancreatic lymph   node.  ABDOMINAL WALL: Within normal limits.  BONES: Within normal limits.    IMPRESSION:  Peripancreatic edema, compatible with acute pancreatitis. No evidence for   pancreatic necrosis. 2.0 x 8.0 cm fluid collection adjacent to the   pancreas inferiorly, likely representing acute peripancreatic fluid   collection. T1 hyperintensity within this fluid collection, suggestive of   hemorrhagic or proteinaceous content.    The common bile duct measures up to 5 mm in caliber which is within   normal limits. No evidence for choledocholithiasis.    No evidence for cholelithiasis.    Splenomegaly.    Small ascites.    Mildly enlarged peripancreatic lymph node.

## 2022-03-02 NOTE — PROGRESS NOTE ADULT - PROVIDER SPECIALTY LIST ADULT
Critical Care
Internal Medicine
Internal Medicine
Pulmonology
Pulmonology
Critical Care
Endocrinology
Hepatology
Internal Medicine
Critical Care
Critical Care
Gastroenterology
Internal Medicine
Internal Medicine
Gastroenterology
Internal Medicine
Internal Medicine

## 2022-03-02 NOTE — PROGRESS NOTE ADULT - SUBJECTIVE AND OBJECTIVE BOX
Patient is a 31y old  Male who presents with a chief complaint of Abdominal pain (01 Mar 2022 18:48)    PATIENT IS SEEN AND EXAMINED IN MEDICAL FLOOR.  NGT [    ]    DUNN [   ]      GT [   ]    ALLERGIES:  No Known Allergies      Daily     Daily     VITALS:    Vital Signs Last 24 Hrs  T(C): 36.6 (02 Mar 2022 05:00), Max: 36.7 (01 Mar 2022 13:03)  T(F): 97.9 (02 Mar 2022 05:00), Max: 98.1 (01 Mar 2022 13:03)  HR: 77 (02 Mar 2022 05:00) (70 - 77)  BP: 145/77 (02 Mar 2022 05:00) (126/71 - 145/77)  BP(mean): --  RR: 15 (02 Mar 2022 05:00) (15 - 20)  SpO2: 93% (02 Mar 2022 05:00) (93% - 100%)    LABS:    CBC Full  -  ( 02 Mar 2022 07:22 )  WBC Count : 13.03 K/uL  RBC Count : 4.82 M/uL  Hemoglobin : 13.1 g/dL  Hematocrit : 38.9 %  Platelet Count - Automated : 384 K/uL  Mean Cell Volume : 80.7 fl  Mean Cell Hemoglobin : 27.2 pg  Mean Cell Hemoglobin Concentration : 33.7 gm/dL  Auto Neutrophil # : x  Auto Lymphocyte # : x  Auto Monocyte # : x  Auto Eosinophil # : x  Auto Basophil # : x  Auto Neutrophil % : x  Auto Lymphocyte % : x  Auto Monocyte % : x  Auto Eosinophil % : x  Auto Basophil % : x      03-02    135  |  103  |  15  ----------------------------<  183<H>  3.5   |  21<L>  |  0.70    Ca    9.7      02 Mar 2022 07:22    TPro  8.0  /  Alb  2.8<L>  /  TBili  0.9  /  DBili  x   /  AST  76<H>  /  ALT  131<H>  /  AlkPhos  154<H>  03-02    CAPILLARY BLOOD GLUCOSE      POCT Blood Glucose.: 209 mg/dL (02 Mar 2022 07:31)  POCT Blood Glucose.: 180 mg/dL (01 Mar 2022 21:11)  POCT Blood Glucose.: 117 mg/dL (01 Mar 2022 16:55)  POCT Blood Glucose.: 144 mg/dL (01 Mar 2022 11:25)        LIVER FUNCTIONS - ( 02 Mar 2022 07:22 )  Alb: 2.8 g/dL / Pro: 8.0 g/dL / ALK PHOS: 154 U/L / ALT: 131 U/L DA / AST: 76 U/L / GGT: x           Creatinine Trend: 0.70<--, 0.72<--, 0.80<--, 0.78<--, 0.68<--, 0.69<--  I&O's Summary          .Blood Blood-Peripheral  02-21 @ 06:27   No Growth Final  --  --      Clean Catch Clean Catch (Midstream)  02-20 @ 12:33   No growth  --  --          MEDICATIONS:    MEDICATIONS  (STANDING):  dextrose 5%. 1000 milliLiter(s) (100 mL/Hr) IV Continuous <Continuous>  enoxaparin Injectable 40 milliGRAM(s) SubCutaneous daily  gemfibrozil 600 milliGRAM(s) Oral every 12 hours  glucagon  Injectable 1 milliGRAM(s) IntraMuscular once  insulin glargine Injectable (LANTUS) 15 Unit(s) SubCutaneous at bedtime  insulin lispro (ADMELOG) corrective regimen sliding scale   SubCutaneous three times a day before meals  insulin lispro Injectable (ADMELOG) 2 Unit(s) SubCutaneous three times a day before meals  metFORMIN 500 milliGRAM(s) Oral two times a day  pantoprazole    Tablet 40 milliGRAM(s) Oral before breakfast  senna 2 Tablet(s) Oral at bedtime      MEDICATIONS  (PRN):      REVIEW OF SYSTEMS:                           ALL ROS DONE [ X   ]    CONSTITUTIONAL:  LETHARGIC [   ], FEVER [   ], UNRESPONSIVE [   ]  CVS:  CP  [   ], SOB, [   ], PALPITATIONS [   ], DIZZYNESS [   ]  RS: COUGH [   ], SPUTUM [   ]  GI: ABDOMINAL PAIN [   ], NAUSEA [   ], VOMITINGS [   ], DIARRHEA [   ], CONSTIPATION [   ]  :  DYSURIA [   ], NOCTURIA [   ], INCREASED FREQUENCY [   ], DRIBLING [   ],  SKELETAL: PAINFUL JOINTS [   ], SWOLLEN JOINTS [   ], NECK ACHE [   ], LOW BACK ACHE [   ],  SKIN : ULCERS [   ], RASH [   ], ITCHING [   ]  CNS: HEAD ACHE [   ], DOUBLE VISION [   ], BLURRED VISION [   ], AMS / CONFUSION [   ], SEIZURES [   ], WEAKNESS [   ],TINGLING / NUMBNESS [   ]    PHYSICAL EXAMINATION:    GENERAL APPEARANCE: NO DISTRESS  HEENT:  NO PALLOR, NO  JVD,  NO   NODES, NECK SUPPLE  CVS: S1 +, S2 +,   RS: AEEB,  OCCASIONAL  RALES +,   NO RONCHI  ABD: SOFT, NO, BS +   - PAIN IN EPIGASTRIUM TO DEEP PALPATION  EXT: NO PE  SKIN: WARM,   SKELETAL:  ROM ACCEPTABLE  CNS:  AAO X  2-3  , NO  DEFICITS        RADIOLOGY :    < from: US Abdomen Upper Quadrant Right (02.23.22 @ 12:26) >    IMPRESSION:    Hepatomegaly and hepatic steatosis.  No cholelithiasis or biliary ductal dilatation.    < end of copied text >        ASSESSMENT :     Acute pancreatitis without infection or necrosis    Diabetes        PLAN:  HPI:  31yoM with h/o DMon oral medications (unsure about name and dose), presents generalized though possibly slightly epigastric-predominant abdominal pain x 3 days. Worsened with laying and standing, non-food related per se. Patient reports vomiting episode and persistent nausea.  Also notes constipation (last BM yesterday) and low urine output.   Patient also reports unable to pass flatus and had last BM on 1 day ago.   Patient reports he had similar complaints 2 years ago but it was not this bad and did not require hospitalization.      # DELAYS WITH MRI MACHINE - D/W PATIENT AND RADIOLOGY - PLANNED FOR MRCP TODAY OR IN A.M. PENDING MACHINE AVAILIBILITY    # ACUTE PANCREATITIS  -- PAIN ONCE MORE AFTER STARTING SOLID DIET  # HYPERTRIGLYCERIDEMIA - IMPROVING  # ALCOHOL ABUSE  # DM2, HYPERGLYCEMIA  - GIVEN IVF, S/P INSULIN DRIP, LANTUS SSI +FS ;  STARTED ON METFORMIN AND LOW-DOSE SULFONYLUREA  - ON GEMFIBROZIL  - COUNSELLED PATIENT ON CESSATION > 10 MINS, SW CONSULT FOR RESOURCES  - ON SOLID DIET - MILD EPIGASTRIC PAIN TO PALPATION  - DIABETIC EDUCATION  - PRN PAIN CONTROL  - ENDOCRINOLOGY CONSULT    - GASTROENTEROLOGY CONSULT - RECOMMEND MRCP - F/U RESULTS      # TRANSAMINITIS  # HEPATIC STEATOSIS  - NOTED RUQ U/S  - MONITORING LFTS  - F/U HEPATITIS PANEL  - HEPATOLOGY CONSULT    # HYPONATREMIA - RESOLVED  # HYPOKALEMIA - REPLETING WITH SUPPLEMENT  # HYPOPHOSPHATEMIA -RESOLVED    # MORBID OBESITY WITH METABOLIC SYNDROME  - COUNSELLED ON NUTRITION AND LIFESTYLE CHANGES    # GI AND DVT PPX

## 2022-03-02 NOTE — PROGRESS NOTE ADULT - SUBJECTIVE AND OBJECTIVE BOX
Time of Visit:  Patient seen and examined.  pat is dressed to be discharge     MEDICATIONS  (STANDING):  dextrose 5%. 1000 milliLiter(s) (100 mL/Hr) IV Continuous <Continuous>  enoxaparin Injectable 40 milliGRAM(s) SubCutaneous daily  gemfibrozil 600 milliGRAM(s) Oral every 12 hours  glucagon  Injectable 1 milliGRAM(s) IntraMuscular once  insulin glargine Injectable (LANTUS) 15 Unit(s) SubCutaneous at bedtime  insulin lispro (ADMELOG) corrective regimen sliding scale   SubCutaneous three times a day before meals  insulin lispro Injectable (ADMELOG) 2 Unit(s) SubCutaneous three times a day before meals  metFORMIN 500 milliGRAM(s) Oral two times a day  pantoprazole    Tablet 40 milliGRAM(s) Oral before breakfast  senna 2 Tablet(s) Oral at bedtime      MEDICATIONS  (PRN):       Medications up to date at time of exam.      PHYSICAL EXAMINATION:  Patient has no new complaints.  GENERAL: The patient is a well-developed, well-nourished, in no apparent distress.     Vital Signs Last 24 Hrs  T(C): 36.7 (02 Mar 2022 13:28), Max: 36.7 (02 Mar 2022 13:28)  T(F): 98 (02 Mar 2022 13:28), Max: 98 (02 Mar 2022 13:28)  HR: 73 (02 Mar 2022 13:28) (70 - 77)  BP: 119/80 (02 Mar 2022 13:28) (119/80 - 145/77)  BP(mean): --  RR: 19 (02 Mar 2022 13:28) (15 - 20)  SpO2: 94% (02 Mar 2022 13:28) (93% - 97%)   (if applicable)    Chest Tube (if applicable)    HEENT: Head is normocephalic and atraumatic. Extraocular muscles are intact. Mucous membranes are moist.     NECK: Supple, no palpable adenopathy.    LUNGS: Clear to auscultation, no wheezing, rales, or rhonchi.    HEART: Regular rate and rhythm without murmur.    ABDOMEN: Soft, nontender, and nondistended.  No hepatosplenomegaly is noted.    : No painful voiding, no pelvic pain    EXTREMITIES: Without any cyanosis, clubbing, rash, lesions or edema.    NEUROLOGIC: Awake, alert, oriented, grossly intact    SKIN: Warm, dry, good turgor.      LABS:                        13.1   13.03 )-----------( 384      ( 02 Mar 2022 07:22 )             38.9     03-02    135  |  103  |  15  ----------------------------<  183<H>  3.5   |  21<L>  |  0.70    Ca    9.7      02 Mar 2022 07:22    TPro  8.0  /  Alb  2.8<L>  /  TBili  0.9  /  DBili  x   /  AST  76<H>  /  ALT  131<H>  /  AlkPhos  154<H>  03-02          Lipase"Lipase, Serum (03.02.22 @ 07:22)    Lipase, Serum: 3687 U/L                  MICROBIOLOGY: (if applicable)    RADIOLOGY & ADDITIONAL STUDIES:  EKG:   CXR:  ECHO:    IMPRESSION: 31y Male PAST MEDICAL & SURGICAL HISTORY:  Diabetes     p/w         IMPRESSION: This is a 30 Y/O  Male presented to ED with  generalized epigastric-predominant abdominal pain x 3 days. Worsened with laying and standing, non-food related . Patient reports he had similar complaints 2 years ago but it was not this bad and did not require hospitalization. Was admitted to ICU for Pancreatitis requiring Insulin Drip. Endocrinology Dr. Reyes consulted, recommended that pt should not continue janumet on discharge, could be managed with  metformin and low dose sulfonylurea upon d/c. Due to  elevated Tbil, RUQ was ordered, which showed hepatomegalia w/ hepatic steatosis. It did not show evidence of cholelythiasis or biliary duct dilation. His SOB resolved and so far saturating good room air. 02-27-22,02-20-22 Nasal swab Negative PCR for Covid 19.      Impression;  Tolerating diet   Elevated lipase today   O2 saturation 98% room air. So far saturating good room air.   Oral hygiene care.  Monitor blood sugar with coverage   Reinforced the importance of smoking cessation.  DVT / GI prophylactic. On Lovenox 40 mg SQ Daily.        Time of Visit:  Patient seen and examined.  pat is dressed to be discharge     MEDICATIONS  (STANDING):  dextrose 5%. 1000 milliLiter(s) (100 mL/Hr) IV Continuous <Continuous>  enoxaparin Injectable 40 milliGRAM(s) SubCutaneous daily  gemfibrozil 600 milliGRAM(s) Oral every 12 hours  glucagon  Injectable 1 milliGRAM(s) IntraMuscular once  insulin glargine Injectable (LANTUS) 15 Unit(s) SubCutaneous at bedtime  insulin lispro (ADMELOG) corrective regimen sliding scale   SubCutaneous three times a day before meals  insulin lispro Injectable (ADMELOG) 2 Unit(s) SubCutaneous three times a day before meals  metFORMIN 500 milliGRAM(s) Oral two times a day  pantoprazole    Tablet 40 milliGRAM(s) Oral before breakfast  senna 2 Tablet(s) Oral at bedtime      MEDICATIONS  (PRN):       Medications up to date at time of exam.      PHYSICAL EXAMINATION:  Patient has no new complaints.  GENERAL: The patient is a well-developed, well-nourished, in no apparent distress.     Vital Signs Last 24 Hrs  T(C): 36.7 (02 Mar 2022 13:28), Max: 36.7 (02 Mar 2022 13:28)  T(F): 98 (02 Mar 2022 13:28), Max: 98 (02 Mar 2022 13:28)  HR: 73 (02 Mar 2022 13:28) (70 - 77)  BP: 119/80 (02 Mar 2022 13:28) (119/80 - 145/77)  BP(mean): --  RR: 19 (02 Mar 2022 13:28) (15 - 20)  SpO2: 94% (02 Mar 2022 13:28) (93% - 97%)   (if applicable)    Chest Tube (if applicable)    HEENT: Head is normocephalic and atraumatic. Extraocular muscles are intact. Mucous membranes are moist.     NECK: Supple, no palpable adenopathy.    LUNGS: Clear to auscultation, no wheezing, rales, or rhonchi.    HEART: Regular rate and rhythm without murmur.    ABDOMEN: Soft, nontender, and nondistended.  No hepatosplenomegaly is noted.    : No painful voiding, no pelvic pain    EXTREMITIES: Without any cyanosis, clubbing, rash, lesions or edema.    NEUROLOGIC: Awake, alert, oriented, grossly intact    SKIN: Warm, dry, good turgor.      LABS:                        13.1   13.03 )-----------( 384      ( 02 Mar 2022 07:22 )             38.9     03-02    135  |  103  |  15  ----------------------------<  183<H>  3.5   |  21<L>  |  0.70    Ca    9.7      02 Mar 2022 07:22    TPro  8.0  /  Alb  2.8<L>  /  TBili  0.9  /  DBili  x   /  AST  76<H>  /  ALT  131<H>  /  AlkPhos  154<H>  03-02          Lipase"Lipase, Serum (03.02.22 @ 07:22)    Lipase, Serum: 3687 U/L                  MICROBIOLOGY: (if applicable)    RADIOLOGY & ADDITIONAL STUDIES:  EKG:   MRCP:< from: MR MRCP w/wo IV Cont (03.02.22 @ 10:44) >    ACC: [id number] EXAM:  MR MRCP [facility] IC                          PROCEDURE DATE:  03/02/2022          INTERPRETATION:  CLINICAL INFORMATION: Acute pancreatitis    COMPARISON: No prior abdominal MR is available for comparison. Reference   is made with aprevious abdominal CT dated 2/20/2022.    CONTRAST/COMPLICATIONS:  IV Contrast: Gadavist  10 cc administered  Oral Contrast: NONE  Complications: None reported at time of study completion    PROCEDURE:  MRI of the abdomen was performed.  MRCP was performed.    FINDINGS:  LOWER CHEST: Within normal limits.    LIVER: Hepatic steatosis. Small 0.8 cm peripheral transient arterial   phase enhancing focus in hepatic segment 5, suggestive of an   arterioportal shunt.  BILE DUCTS: The common bile duct measures up to 5 mm in caliber which is   within normal limits. No evidence for choledocholithiasis.  GALLBLADDER: No evidence for gallstones, thickened gallbladder wall,   pericholecystic fluid.  SPLEEN: Splenomegaly measuring 17.2 cm.  PANCREAS: Peripancreatic edema, compatible with acute pancreatitis. No   evidence for pancreatic necrosis. 2.0 x 8.0 cm fluid collection adjacent   to the pancreas inferiorly, likely representing acute peripancreatic   fluid collection. There is T1 hyperintensity within this fluid   collection, suggestive of hemorrhagic or proteinaceous content.    ADRENALS: Within normal limits.  KIDNEYS/URETERS: Within normal limits.    VISUALIZED PORTIONS:  BOWEL: Within normal limits.  PERITONEUM: Small ascites.  VESSELS: Within normal limits.  RETROPERITONEUM/LYMPH NODES: Mildly enlarged 1.3 cm peripancreatic lymph   node.  ABDOMINAL WALL: Within normal limits.  BONES: Within normal limits.    IMPRESSION:  Peripancreatic edema, compatible with acute pancreatitis. No evidence for   pancreatic necrosis. 2.0 x 8.0 cm fluid collection adjacent to the   pancreas inferiorly, likely representing acute peripancreatic fluid   collection. T1 hyperintensity within this fluid collection, suggestive of   hemorrhagic or proteinaceous content.    The common bile duct measures up to 5 mm in caliber which is within   normal limits. No evidence for choledocholithiasis.    No evidence for cholelithiasis.    Splenomegaly.    Small ascites.    Mildly enlarged peripancreatic lymph node.    --- End of Report ---            SUSANA BALTAZAR MD; Attending Radiologist  This document has been electronically signed. Mar  2 2022 10:48AM    < end of copied text >    ECHO:    IMPRESSION: 31y Male PAST MEDICAL & SURGICAL HISTORY:  Diabetes     p/w         IMPRESSION: This is a 32 Y/O  Male presented to ED with  generalized epigastric-predominant abdominal pain x 3 days. Worsened with laying and standing, non-food related . Patient reports he had similar complaints 2 years ago but it was not this bad and did not require hospitalization. Was admitted to ICU for Pancreatitis requiring Insulin Drip. Endocrinology Dr. Reyes consulted, recommended that pt should not continue janumet on discharge, could be managed with  metformin and low dose sulfonylurea upon d/c. Due to  elevated Tbil, RUQ was ordered, which showed hepatomegalia w/ hepatic steatosis. It did not show evidence of cholelythiasis or biliary duct dilation. His SOB resolved and so far saturating good room air. 02-27-22,02-20-22 Nasal swab Negative PCR for Covid 19.      Impression;  S/P MRCP as above   Tolerating diet   Elevated lipase today   O2 saturation 98% room air. So far saturating good room air.   Oral hygiene care.  Monitor blood sugar with coverage   Reinforced the importance of smoking cessation.  DVT / GI prophylactic. On Lovenox 40 mg SQ Daily.

## 2022-03-02 NOTE — PROGRESS NOTE ADULT - REASON FOR ADMISSION
Abdominal pain

## 2022-03-23 PROBLEM — Z00.00 ENCOUNTER FOR PREVENTIVE HEALTH EXAMINATION: Status: ACTIVE | Noted: 2022-03-23

## 2022-04-18 ENCOUNTER — APPOINTMENT (OUTPATIENT)
Dept: GASTROENTEROLOGY | Facility: CLINIC | Age: 32
End: 2022-04-18

## 2022-10-11 NOTE — DISCHARGE NOTE NURSING/CASE MANAGEMENT/SOCIAL WORK - NSDPLANG ASISYS_GEN_ALL_CORE
Yes... Wartpeel Counseling:  I discussed with the patient the risks of Wartpeel including but not limited to erythema, scaling, itching, weeping, crusting, and pain.

## 2023-05-15 NOTE — SBIRT NOTE ADULT - NSSBIRTBRIEFINTDET_GEN_A_CORE
Healthy drinking guidelines reviewed with patient.  Negative consequences, medical conditions related to substance misuse discussed.  Patient does not find that his drinking is problematic.  Denied hx s/a tx.  Resources and/or referral declined.
Melecio Jean)  Cardiology  [address] [place] [address]  Phone: [phone]  Fax: [phone]  Follow Up Time:

## 2023-05-23 NOTE — DISCHARGE NOTE PROVIDER - REASON FOR ADMISSION
Due to a family death, patient called to cancel Colonoscopy with Dr. Keller on 5/24/23. Patient will call back to reschedule at a later date.   Abdominal pain